# Patient Record
Sex: FEMALE | Race: WHITE | Employment: OTHER | URBAN - METROPOLITAN AREA
[De-identification: names, ages, dates, MRNs, and addresses within clinical notes are randomized per-mention and may not be internally consistent; named-entity substitution may affect disease eponyms.]

---

## 2017-02-01 ENCOUNTER — TELEPHONE (OUTPATIENT)
Dept: FAMILY MEDICINE CLINIC | Age: 77
End: 2017-02-01

## 2017-02-03 ENCOUNTER — OFFICE VISIT (OUTPATIENT)
Dept: FAMILY MEDICINE CLINIC | Age: 77
End: 2017-02-03

## 2017-02-03 VITALS
BODY MASS INDEX: 21.77 KG/M2 | DIASTOLIC BLOOD PRESSURE: 70 MMHG | SYSTOLIC BLOOD PRESSURE: 108 MMHG | HEART RATE: 100 BPM | WEIGHT: 128.8 LBS | OXYGEN SATURATION: 95 %

## 2017-02-03 DIAGNOSIS — N64.4 BREAST PAIN, LEFT: Primary | ICD-10-CM

## 2017-02-03 PROCEDURE — 99213 OFFICE O/P EST LOW 20 MIN: CPT | Performed by: FAMILY MEDICINE

## 2017-02-13 ENCOUNTER — HOSPITAL ENCOUNTER (OUTPATIENT)
Dept: MAMMOGRAPHY | Age: 77
Discharge: HOME | End: 2017-02-13
Attending: FAMILY MEDICINE | Admitting: FAMILY MEDICINE

## 2017-02-13 DIAGNOSIS — N64.4 BREAST PAIN, LEFT: ICD-10-CM

## 2017-02-24 ENCOUNTER — OFFICE VISIT (OUTPATIENT)
Dept: FAMILY MEDICINE CLINIC | Age: 77
End: 2017-02-24

## 2017-02-24 VITALS
HEART RATE: 80 BPM | SYSTOLIC BLOOD PRESSURE: 130 MMHG | BODY MASS INDEX: 21.11 KG/M2 | HEIGHT: 65 IN | DIASTOLIC BLOOD PRESSURE: 84 MMHG | WEIGHT: 126.7 LBS

## 2017-02-24 DIAGNOSIS — R73.01 ELEVATED FASTING BLOOD SUGAR: ICD-10-CM

## 2017-02-24 DIAGNOSIS — Z00.00 ROUTINE GENERAL MEDICAL EXAMINATION AT A HEALTH CARE FACILITY: ICD-10-CM

## 2017-02-24 DIAGNOSIS — Z12.12 SCREENING FOR COLORECTAL CANCER: ICD-10-CM

## 2017-02-24 DIAGNOSIS — I10 ESSENTIAL HYPERTENSION: Primary | Chronic | ICD-10-CM

## 2017-02-24 DIAGNOSIS — G25.0 BENIGN FAMILIAL TREMOR: Chronic | ICD-10-CM

## 2017-02-24 DIAGNOSIS — Z12.11 SCREENING FOR COLORECTAL CANCER: ICD-10-CM

## 2017-02-24 LAB
A/G RATIO: 1.8 (ref 1.1–2.2)
ALBUMIN SERPL-MCNC: 4.6 G/DL (ref 3.4–5)
ALP BLD-CCNC: 86 U/L (ref 40–129)
ALT SERPL-CCNC: 20 U/L (ref 10–40)
ANION GAP SERPL CALCULATED.3IONS-SCNC: 15 MMOL/L (ref 3–16)
AST SERPL-CCNC: 19 U/L (ref 15–37)
BASOPHILS ABSOLUTE: 0.1 K/UL (ref 0–0.2)
BASOPHILS RELATIVE PERCENT: 0.8 %
BILIRUB SERPL-MCNC: 0.6 MG/DL (ref 0–1)
BUN BLDV-MCNC: 13 MG/DL (ref 7–20)
CALCIUM SERPL-MCNC: 9.7 MG/DL (ref 8.3–10.6)
CHLORIDE BLD-SCNC: 95 MMOL/L (ref 99–110)
CHOLESTEROL, TOTAL: 194 MG/DL (ref 0–199)
CO2: 27 MMOL/L (ref 21–32)
CREAT SERPL-MCNC: 0.8 MG/DL (ref 0.6–1.2)
EOSINOPHILS ABSOLUTE: 0.1 K/UL (ref 0–0.6)
EOSINOPHILS RELATIVE PERCENT: 0.9 %
GFR AFRICAN AMERICAN: >60
GFR NON-AFRICAN AMERICAN: >60
GLOBULIN: 2.6 G/DL
GLUCOSE BLD-MCNC: 107 MG/DL (ref 70–99)
HCT VFR BLD CALC: 42.6 % (ref 36–48)
HDLC SERPL-MCNC: 73 MG/DL (ref 40–60)
HEMOGLOBIN: 14 G/DL (ref 12–16)
LDL CHOLESTEROL CALCULATED: 100 MG/DL
LYMPHOCYTES ABSOLUTE: 2.1 K/UL (ref 1–5.1)
LYMPHOCYTES RELATIVE PERCENT: 18 %
MCH RBC QN AUTO: 31.4 PG (ref 26–34)
MCHC RBC AUTO-ENTMCNC: 32.8 G/DL (ref 31–36)
MCV RBC AUTO: 95.7 FL (ref 80–100)
MONOCYTES ABSOLUTE: 0.7 K/UL (ref 0–1.3)
MONOCYTES RELATIVE PERCENT: 5.7 %
NEUTROPHILS ABSOLUTE: 8.6 K/UL (ref 1.7–7.7)
NEUTROPHILS RELATIVE PERCENT: 74.6 %
PDW BLD-RTO: 13.3 % (ref 12.4–15.4)
PLATELET # BLD: 190 K/UL (ref 135–450)
PMV BLD AUTO: 9.1 FL (ref 5–10.5)
POTASSIUM SERPL-SCNC: 4.1 MMOL/L (ref 3.5–5.1)
RBC # BLD: 4.45 M/UL (ref 4–5.2)
SODIUM BLD-SCNC: 137 MMOL/L (ref 136–145)
TOTAL PROTEIN: 7.2 G/DL (ref 6.4–8.2)
TRIGL SERPL-MCNC: 104 MG/DL (ref 0–150)
VLDLC SERPL CALC-MCNC: 21 MG/DL
WBC # BLD: 11.5 K/UL (ref 4–11)

## 2017-02-24 PROCEDURE — 36415 COLL VENOUS BLD VENIPUNCTURE: CPT | Performed by: FAMILY MEDICINE

## 2017-02-24 PROCEDURE — 99214 OFFICE O/P EST MOD 30 MIN: CPT | Performed by: FAMILY MEDICINE

## 2017-02-24 RX ORDER — LISINOPRIL 2.5 MG/1
TABLET ORAL
Qty: 90 TABLET | Refills: 1 | Status: SHIPPED | OUTPATIENT
Start: 2017-02-24 | End: 2017-09-07 | Stop reason: SDUPTHER

## 2017-02-24 RX ORDER — PROPRANOLOL HYDROCHLORIDE 120 MG/1
CAPSULE, EXTENDED RELEASE ORAL
Qty: 90 CAPSULE | Refills: 1 | Status: SHIPPED | OUTPATIENT
Start: 2017-02-24 | End: 2017-09-07 | Stop reason: SDUPTHER

## 2017-03-09 LAB
CONTROL: NORMAL
HEMOCCULT STL QL: NEGATIVE

## 2017-03-09 PROCEDURE — 82274 ASSAY TEST FOR BLOOD FECAL: CPT | Performed by: FAMILY MEDICINE

## 2017-10-23 ENCOUNTER — OFFICE VISIT (OUTPATIENT)
Dept: DERMATOLOGY | Age: 77
End: 2017-10-23

## 2017-10-23 DIAGNOSIS — L57.0 ACTINIC KERATOSES: Primary | ICD-10-CM

## 2017-10-23 DIAGNOSIS — Z12.83 SCREENING EXAM FOR SKIN CANCER: ICD-10-CM

## 2017-10-23 DIAGNOSIS — Z85.828 HISTORY OF NONMELANOMA SKIN CANCER: ICD-10-CM

## 2017-10-23 PROCEDURE — 17000 DESTRUCT PREMALG LESION: CPT | Performed by: DERMATOLOGY

## 2017-10-23 PROCEDURE — 99213 OFFICE O/P EST LOW 20 MIN: CPT | Performed by: DERMATOLOGY

## 2017-10-23 PROCEDURE — 17003 DESTRUCT PREMALG LES 2-14: CPT | Performed by: DERMATOLOGY

## 2017-10-23 NOTE — PROGRESS NOTES
[facility]) Dermatology  Su Vicente MD  [phone]      Dorris Spatz  1940    78 y.o. female     Date of Visit: 10/23/2017    Last Visit: 6mo    Chief Complaint: Skin check    History of Present Illness:  1. Here for skin check. 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    -No new lesions of concern.   -Wears long pants and sleeves, SPF 30 sunscreen whenever out. 2. History of actinic keratoses s/p cryotherapy. Reports a rough lesion on top of scalp. Review of Systems:  Constitutional: Reports general sense of well-being. Skin: No interval severe sunburns. Allergies: Reviewed and updated. Past Medical History, Surgical History, Medications and Allergies reviewed.      Past Medical History:   Diagnosis Date    Basal cell carcinoma of skin of trunk 5/2011    back    Benign familial tremor     sxs began in HS    Decreased bone density     Hip fracture ([address] 2/12    sustained in fall    Hypertension      Past Surgical History:   Procedure Laterality Date    DENTAL SURGERY  1/2013    JOINT REPLACEMENT  2/13/12    right bipolar hip replacement    OTHER SURGICAL HISTORY  10/2011 approx    mole removal from back    SKIN CANCER EXCISION  8/2015    squamous cell skin cancer    TONSILLECTOMY  age 13   [facility] TUBAL LIGATION      d & c done simultaneously to remove IUD       No Known Allergies  Outpatient Prescriptions Marked as Taking for the 10/23/17 encounter (Office Visit) with Su Vicente MD   Medication Sig Dispense Refill    lisinopril (PRINIVIL;ZESTRIL) 2.5 MG tablet TAKE ONE TABLET BY MOUTH ONCE DAILY 90 tablet 0    propranolol (INDERAL LA) 120 MG extended release capsule TAKE ONE CAPSULE BY MOUTH ONCE DAILY 90 capsule 0    Omega-3 Fatty Acids (FISH OIL PO) Take 1 capsule by mouth daily      Multiple Vitamins-Minerals (MULTIVITAMIN ADULT PO) Take 1 tablet by mouth daily         Physical Examination     The following were examined and determined to be normal: Psych/Neuro, Conjunctivae/eyelids, Gums/teeth/lips, Neck, Breast/axilla/chest, Abdomen, LUE, RLE, LLE, Nails/digits and Genitalia/groin/buttocks. The following were examined and determined to be abnormal: Scalp/hair, Head/face, Back and RUE. -General: Well-appearing, NAD  1. Midline upper back, R upper arm - scars clear  2. L vertex scalp 1, nasal bridge 1, R temple 1 - ill-defined irregularly-shaped roughly-scaling thin pink macules/papules     Assessment and Plan     1. History of NMSC - clear today  -Reviewed sun protective behavior -- sun avoidance during the peak hours of the day, sun-protective clothing (including hat and sunglasses), sunscreen use (water resistant, broad spectrum, SPF at least 30, need for reapplication every 2 to 3 hours), avoidance of tanning beds  -Full skin exam in 1 year (sooner if indicated)      2. Actinic keratosis(es)  -Edu re: relationship with chronic cumulative sun exposure, low premalignant potential.   -3 lesion(s) treated w/ liquid nitrogen x 2 cycles - L vertex scalp 1, nasal bridge 1, R temple 1. Edu re: risk of blister formation, discomfort, scar, hypopigmentation. Discussed wound care.

## 2017-10-23 NOTE — PATIENT INSTRUCTIONS
Protecting Yourself From the Sun    · Apply broad spectrum water resistant sunscreen with an SPF of at least 30 to exposed areas of the skin. Dont forget the ears and lips! Remember to reapply sunscreen about every 2 hours and after swimming or sweating. · Wear sun protective clothing. Swim shirts (aka. rash guards) are a great idea and negates the need to reapply sunscreen in those areas. · Seek the shade whenever possible especially between the hours of 10 am and 4 pm when the suns rays are the strongest.     · Avoid tanning beds       Cryosurgery (Freezing) Wound Care Instructions    AFTER THE PROCEDURE:    You will notice swelling and redness around the site. This is normal.    You may experience a sharp or sore feeling for the next several days. For this discomfort, you may take acetaminophen (Tylenol©).  A blister may develop at the treated area, sometimes as soon as by the end of the day. After several days, the blister will subside and a scab will form.  If the area is bumped or traumatized during the first few days following freezing, you may develop bleeding into the blister, forming a blood blister. This is nothing to be alarmed about.  If the blister is tense, uncomfortable, or much larger than the site that was frozen, you may pop the blister along its edge with a sterile needle (boiled, heated under a flame, or cleaned with alcohol) to allow the fluid to drain out. If the blister does not bother you, no treatment is needed.  Do NOT peel off the top of the blister roof. It will act as a dressing on top of your wound. WOUND CARE:    You may shower or bathe as usual, but avoid scrubbing the areas that have been frozen.  Cleanse the site twice a day with mild soapy water, and then apply a thin film of white petrolatum (Vaseline©).  You do not need to cover the area, but can if you prefer.     Do NOT allow the site to become dry or crusted, or attempt to dry it out with rubbing alcohol or hydrogen peroxide.  Continue this regimen until the area is pink and healed. Depending on the size and location of your cryosurgery site, healing may take 2 to 4 weeks.  The area may continue to be pink for several weeks, and over the next few months may become darker or lighter than the surrounding skin. This may be a permanent change.

## 2017-12-06 DIAGNOSIS — G25.0 BENIGN FAMILIAL TREMOR: Chronic | ICD-10-CM

## 2017-12-06 DIAGNOSIS — I10 ESSENTIAL HYPERTENSION: Chronic | ICD-10-CM

## 2017-12-06 RX ORDER — PROPRANOLOL HYDROCHLORIDE 120 MG/1
CAPSULE, EXTENDED RELEASE ORAL
Qty: 90 CAPSULE | Refills: 0 | Status: SHIPPED | OUTPATIENT
Start: 2017-12-06 | End: 2018-03-13 | Stop reason: SDUPTHER

## 2017-12-06 RX ORDER — LISINOPRIL 2.5 MG/1
TABLET ORAL
Qty: 90 TABLET | Refills: 0 | Status: SHIPPED | OUTPATIENT
Start: 2017-12-06 | End: 2018-03-13 | Stop reason: SDUPTHER

## 2017-12-12 ENCOUNTER — OFFICE VISIT (OUTPATIENT)
Dept: FAMILY MEDICINE CLINIC | Age: 77
End: 2017-12-12

## 2017-12-12 VITALS
DIASTOLIC BLOOD PRESSURE: 72 MMHG | OXYGEN SATURATION: 91 % | BODY MASS INDEX: 21.43 KG/M2 | HEART RATE: 54 BPM | SYSTOLIC BLOOD PRESSURE: 122 MMHG | WEIGHT: 128.6 LBS | HEIGHT: 65 IN

## 2017-12-12 DIAGNOSIS — I10 ESSENTIAL HYPERTENSION: Primary | ICD-10-CM

## 2017-12-12 LAB
A/G RATIO: 1.9 (ref 1.1–2.2)
ALBUMIN SERPL-MCNC: 4.7 G/DL (ref 3.4–5)
ALP BLD-CCNC: 91 U/L (ref 40–129)
ALT SERPL-CCNC: 17 U/L (ref 10–40)
ANION GAP SERPL CALCULATED.3IONS-SCNC: 16 MMOL/L (ref 3–16)
AST SERPL-CCNC: 17 U/L (ref 15–37)
BILIRUB SERPL-MCNC: 0.6 MG/DL (ref 0–1)
BUN BLDV-MCNC: 14 MG/DL (ref 7–20)
CALCIUM SERPL-MCNC: 9.7 MG/DL (ref 8.3–10.6)
CHLORIDE BLD-SCNC: 98 MMOL/L (ref 99–110)
CHOLESTEROL, TOTAL: 194 MG/DL (ref 0–199)
CO2: 26 MMOL/L (ref 21–32)
CREAT SERPL-MCNC: 0.7 MG/DL (ref 0.6–1.2)
GFR AFRICAN AMERICAN: >60
GFR NON-AFRICAN AMERICAN: >60
GLOBULIN: 2.5 G/DL
GLUCOSE BLD-MCNC: 109 MG/DL (ref 70–99)
HDLC SERPL-MCNC: 71 MG/DL (ref 40–60)
LDL CHOLESTEROL CALCULATED: 101 MG/DL
POTASSIUM SERPL-SCNC: 4.4 MMOL/L (ref 3.5–5.1)
SODIUM BLD-SCNC: 140 MMOL/L (ref 136–145)
TOTAL PROTEIN: 7.2 G/DL (ref 6.4–8.2)
TRIGL SERPL-MCNC: 110 MG/DL (ref 0–150)
VLDLC SERPL CALC-MCNC: 22 MG/DL

## 2017-12-12 PROCEDURE — 36415 COLL VENOUS BLD VENIPUNCTURE: CPT | Performed by: PHYSICIAN ASSISTANT

## 2017-12-12 PROCEDURE — 99213 OFFICE O/P EST LOW 20 MIN: CPT | Performed by: PHYSICIAN ASSISTANT

## 2017-12-12 RX ORDER — INFLUENZA A VIRUS A/MICHIGAN/45/2015 X-275 (H1N1) ANTIGEN (FORMALDEHYDE INACTIVATED), INFLUENZA A VIRUS A/SINGAPORE/INFIMH-16-0019/2016 IVR-186 (H3N2) ANTIGEN (FORMALDEHYDE INACTIVATED), AND INFLUENZA B VIRUS B/MARYLAND/15/2016 BX-69A (A B/COLORADO/6/2017-LIKE VIRUS) ANTIGEN (FORMALDEHYDE INACTIVATED) 60; 60; 60 UG/.5ML; UG/.5ML; UG/.5ML
INJECTION, SUSPENSION INTRAMUSCULAR
COMMUNITY
Start: 2017-10-17 | End: 2018-06-14 | Stop reason: ALTCHOICE

## 2017-12-12 ASSESSMENT — PATIENT HEALTH QUESTIONNAIRE - PHQ9
1. LITTLE INTEREST OR PLEASURE IN DOING THINGS: 0
SUM OF ALL RESPONSES TO PHQ9 QUESTIONS 1 & 2: 0
SUM OF ALL RESPONSES TO PHQ QUESTIONS 1-9: 0
2. FEELING DOWN, DEPRESSED OR HOPELESS: 0

## 2017-12-12 NOTE — PROGRESS NOTES
Subjective:   Germain Knight is a 78 y.o. female with hypertension. Current Outpatient Prescriptions   Medication Sig Dispense Refill    FLUZONE HIGH-DOSE 0.5 ML SINA injection       lisinopril (PRINIVIL;ZESTRIL) 2.5 MG tablet TAKE ONE TABLET BY MOUTH ONCE DAILY 90 tablet 0    propranolol (INDERAL LA) 120 MG extended release capsule TAKE ONE CAPSULE BY MOUTH ONCE DAILY 90 capsule 0    Omega-3 Fatty Acids (FISH OIL PO) Take 1 capsule by mouth daily      Multiple Vitamins-Minerals (MULTIVITAMIN ADULT PO) Take 1 tablet by mouth daily       No current facility-administered medications for this visit. Hypertension ROS: taking medications as instructed, no medication side effects noted, no TIA's, no chest pain on exertion, no dyspnea on exertion, no swelling of ankles. New concerns: none today. .     Objective:   /72 (Site: Left Arm, Position: Sitting, Cuff Size: Medium Adult)   Pulse 54   Ht 5' 4.5\" (1.638 m)   Wt 128 lb 9.6 oz (58.3 kg)   LMP 03/01/1992   SpO2 91%   BMI 21.73 kg/m²    Appearance alert, well appearing, and in no distress, oriented to person, place, and time and normal appearing weight. General exam BP noted to be well controlled today in office, S1, S2 normal, no gallop, no murmur, chest clear, no JVD, no HSM, no edema. Assessment:    Hypertension well controlled. Plan:   Current treatment plan is effective, no change in therapy. Olive Rojo

## 2018-03-13 ENCOUNTER — TELEPHONE (OUTPATIENT)
Dept: FAMILY MEDICINE CLINIC | Age: 78
End: 2018-03-13

## 2018-03-13 DIAGNOSIS — G25.0 BENIGN FAMILIAL TREMOR: Chronic | ICD-10-CM

## 2018-03-13 DIAGNOSIS — I10 ESSENTIAL HYPERTENSION: Chronic | ICD-10-CM

## 2018-03-13 RX ORDER — PROPRANOLOL HYDROCHLORIDE 120 MG/1
CAPSULE, EXTENDED RELEASE ORAL
Qty: 90 CAPSULE | Refills: 0 | Status: SHIPPED | OUTPATIENT
Start: 2018-03-13 | End: 2018-06-14 | Stop reason: SDUPTHER

## 2018-03-13 RX ORDER — LISINOPRIL 2.5 MG/1
TABLET ORAL
Qty: 90 TABLET | Refills: 0 | Status: SHIPPED | OUTPATIENT
Start: 2018-03-13 | End: 2018-05-21 | Stop reason: SDUPTHER

## 2018-03-13 NOTE — TELEPHONE ENCOUNTER
Patient returned call. Patient( and son) did not make an appointment until June. Advised sooner, but said would like to hold off until then. She has not taken the Lisinopril for a week. Feels fine. She would like to completely stop taking this medication. I advised to do BP readings daily and report to office. Also advised clinical would call if Dr. Soraya Cramer would lke her to do something different. Leave a message is no message.

## 2018-03-13 NOTE — TELEPHONE ENCOUNTER
Debra Bob is requesting refill(s) lisinopril, propranolol  Last OV 12/12/17 (pertaining to medication)  LR 12/6/17 (per medication requested)  Next office visit scheduled or attempted No   If no, reason:  Left message for patient to call and schedule 6mo f/u

## 2018-05-21 DIAGNOSIS — I10 ESSENTIAL HYPERTENSION: Chronic | ICD-10-CM

## 2018-05-22 RX ORDER — LISINOPRIL 2.5 MG/1
TABLET ORAL
Qty: 90 TABLET | Refills: 0 | Status: SHIPPED | OUTPATIENT
Start: 2018-05-22 | End: 2018-09-04 | Stop reason: SDUPTHER

## 2018-06-14 ENCOUNTER — OFFICE VISIT (OUTPATIENT)
Dept: FAMILY MEDICINE CLINIC | Age: 78
End: 2018-06-14

## 2018-06-14 VITALS
HEIGHT: 65 IN | HEART RATE: 86 BPM | WEIGHT: 126.8 LBS | BODY MASS INDEX: 21.13 KG/M2 | SYSTOLIC BLOOD PRESSURE: 144 MMHG | DIASTOLIC BLOOD PRESSURE: 80 MMHG

## 2018-06-14 DIAGNOSIS — I10 ESSENTIAL HYPERTENSION: Chronic | ICD-10-CM

## 2018-06-14 DIAGNOSIS — Z78.0 POSTMENOPAUSAL: ICD-10-CM

## 2018-06-14 DIAGNOSIS — R73.03 PREDIABETES: ICD-10-CM

## 2018-06-14 DIAGNOSIS — D72.829 LEUKOCYTOSIS, UNSPECIFIED TYPE: Primary | ICD-10-CM

## 2018-06-14 DIAGNOSIS — G25.0 BENIGN FAMILIAL TREMOR: Chronic | ICD-10-CM

## 2018-06-14 LAB
ANION GAP SERPL CALCULATED.3IONS-SCNC: 16 MMOL/L (ref 3–16)
BASOPHILS ABSOLUTE: 0.1 K/UL (ref 0–0.2)
BASOPHILS RELATIVE PERCENT: 1.2 %
BUN BLDV-MCNC: 11 MG/DL (ref 7–20)
CALCIUM SERPL-MCNC: 9.6 MG/DL (ref 8.3–10.6)
CHLORIDE BLD-SCNC: 94 MMOL/L (ref 99–110)
CO2: 26 MMOL/L (ref 21–32)
CREAT SERPL-MCNC: 0.7 MG/DL (ref 0.6–1.2)
EOSINOPHILS ABSOLUTE: 0.1 K/UL (ref 0–0.6)
EOSINOPHILS RELATIVE PERCENT: 1.2 %
GFR AFRICAN AMERICAN: >60
GFR NON-AFRICAN AMERICAN: >60
GLUCOSE BLD-MCNC: 114 MG/DL (ref 70–99)
HCT VFR BLD CALC: 40.6 % (ref 36–48)
HEMOGLOBIN: 13.9 G/DL (ref 12–16)
LYMPHOCYTES ABSOLUTE: 1.6 K/UL (ref 1–5.1)
LYMPHOCYTES RELATIVE PERCENT: 23.7 %
MCH RBC QN AUTO: 32.7 PG (ref 26–34)
MCHC RBC AUTO-ENTMCNC: 34.3 G/DL (ref 31–36)
MCV RBC AUTO: 95.5 FL (ref 80–100)
MONOCYTES ABSOLUTE: 0.5 K/UL (ref 0–1.3)
MONOCYTES RELATIVE PERCENT: 7.4 %
NEUTROPHILS ABSOLUTE: 4.6 K/UL (ref 1.7–7.7)
NEUTROPHILS RELATIVE PERCENT: 66.5 %
PDW BLD-RTO: 13.6 % (ref 12.4–15.4)
PLATELET # BLD: 193 K/UL (ref 135–450)
PMV BLD AUTO: 9.6 FL (ref 5–10.5)
POTASSIUM SERPL-SCNC: 4.7 MMOL/L (ref 3.5–5.1)
RBC # BLD: 4.25 M/UL (ref 4–5.2)
SODIUM BLD-SCNC: 136 MMOL/L (ref 136–145)
TSH REFLEX: 1.87 UIU/ML (ref 0.27–4.2)
WBC # BLD: 6.9 K/UL (ref 4–11)

## 2018-06-14 PROCEDURE — 36415 COLL VENOUS BLD VENIPUNCTURE: CPT | Performed by: FAMILY MEDICINE

## 2018-06-14 PROCEDURE — 99214 OFFICE O/P EST MOD 30 MIN: CPT | Performed by: FAMILY MEDICINE

## 2018-06-14 RX ORDER — PROPRANOLOL HYDROCHLORIDE 120 MG/1
CAPSULE, EXTENDED RELEASE ORAL
Qty: 90 CAPSULE | Refills: 3 | Status: SHIPPED | OUTPATIENT
Start: 2018-06-14 | End: 2019-08-30 | Stop reason: SDUPTHER

## 2018-06-14 ASSESSMENT — ENCOUNTER SYMPTOMS
VOMITING: 0
EYE REDNESS: 0
COUGH: 0
DIARRHEA: 0
BLOOD IN STOOL: 0
RHINORRHEA: 0
COLOR CHANGE: 0
SHORTNESS OF BREATH: 0
ABDOMINAL PAIN: 0
EYE PAIN: 0
SINUS PRESSURE: 0
WHEEZING: 0
CONSTIPATION: 0
EYE DISCHARGE: 0
CHEST TIGHTNESS: 0

## 2018-06-15 LAB
ESTIMATED AVERAGE GLUCOSE: 122.6 MG/DL
HBA1C MFR BLD: 5.9 %

## 2018-06-18 ENCOUNTER — TELEPHONE (OUTPATIENT)
Dept: FAMILY MEDICINE CLINIC | Age: 78
End: 2018-06-18

## 2018-06-18 NOTE — TELEPHONE ENCOUNTER
FYI  --  Patient returned call regarding results. I read the physician notes, she understood, no questions.

## 2018-09-04 DIAGNOSIS — I10 ESSENTIAL HYPERTENSION: Chronic | ICD-10-CM

## 2018-09-04 RX ORDER — LISINOPRIL 2.5 MG/1
TABLET ORAL
Qty: 90 TABLET | Refills: 0 | Status: SHIPPED | OUTPATIENT
Start: 2018-09-04 | End: 2018-11-30 | Stop reason: SDUPTHER

## 2018-09-04 NOTE — TELEPHONE ENCOUNTER
Cleave Patricia is requesting refill(s) lisinopril  Last OV 6/14/18 (pertaining to medication)  LR 5/22/18 (per medication requested)  Next office visit scheduled or attempted Yes   If no, reason:  1/14/19

## 2018-10-22 ENCOUNTER — OFFICE VISIT (OUTPATIENT)
Dept: DERMATOLOGY | Age: 78
End: 2018-10-22
Payer: COMMERCIAL

## 2018-10-22 DIAGNOSIS — L57.0 ACTINIC KERATOSES: Primary | ICD-10-CM

## 2018-10-22 DIAGNOSIS — Z12.83 SCREENING EXAM FOR SKIN CANCER: ICD-10-CM

## 2018-10-22 DIAGNOSIS — Z85.828 HISTORY OF NONMELANOMA SKIN CANCER: ICD-10-CM

## 2018-10-22 PROCEDURE — 17003 DESTRUCT PREMALG LES 2-14: CPT | Performed by: DERMATOLOGY

## 2018-10-22 PROCEDURE — 17000 DESTRUCT PREMALG LESION: CPT | Performed by: DERMATOLOGY

## 2018-10-22 PROCEDURE — 99213 OFFICE O/P EST LOW 20 MIN: CPT | Performed by: DERMATOLOGY

## 2018-11-30 DIAGNOSIS — I10 ESSENTIAL HYPERTENSION: Chronic | ICD-10-CM

## 2018-11-30 RX ORDER — LISINOPRIL 2.5 MG/1
TABLET ORAL
Qty: 90 TABLET | Refills: 0 | Status: SHIPPED | OUTPATIENT
Start: 2018-11-30 | End: 2019-02-27 | Stop reason: SDUPTHER

## 2019-02-27 DIAGNOSIS — I10 ESSENTIAL HYPERTENSION: Chronic | ICD-10-CM

## 2019-02-27 RX ORDER — LISINOPRIL 2.5 MG/1
TABLET ORAL
Qty: 90 TABLET | Refills: 0 | Status: SHIPPED | OUTPATIENT
Start: 2019-02-27 | End: 2019-06-07 | Stop reason: SDUPTHER

## 2019-03-05 ENCOUNTER — OFFICE VISIT (OUTPATIENT)
Dept: FAMILY MEDICINE CLINIC | Age: 79
End: 2019-03-05
Payer: MEDICARE

## 2019-03-05 VITALS
HEIGHT: 65 IN | WEIGHT: 126.6 LBS | OXYGEN SATURATION: 97 % | BODY MASS INDEX: 21.09 KG/M2 | DIASTOLIC BLOOD PRESSURE: 78 MMHG | HEART RATE: 80 BPM | SYSTOLIC BLOOD PRESSURE: 130 MMHG

## 2019-03-05 DIAGNOSIS — M85.80 OSTEOPENIA, UNSPECIFIED LOCATION: Chronic | ICD-10-CM

## 2019-03-05 DIAGNOSIS — R73.03 PREDIABETES: ICD-10-CM

## 2019-03-05 DIAGNOSIS — G25.0 BENIGN FAMILIAL TREMOR: Chronic | ICD-10-CM

## 2019-03-05 DIAGNOSIS — K40.90 RIGHT GROIN HERNIA: ICD-10-CM

## 2019-03-05 DIAGNOSIS — I10 ESSENTIAL HYPERTENSION: Primary | ICD-10-CM

## 2019-03-05 LAB
ANION GAP SERPL CALCULATED.3IONS-SCNC: 15 MMOL/L (ref 3–16)
BUN BLDV-MCNC: 9 MG/DL (ref 7–20)
CALCIUM SERPL-MCNC: 9.6 MG/DL (ref 8.3–10.6)
CHLORIDE BLD-SCNC: 96 MMOL/L (ref 99–110)
CO2: 27 MMOL/L (ref 21–32)
CREAT SERPL-MCNC: 0.7 MG/DL (ref 0.6–1.2)
GFR AFRICAN AMERICAN: >60
GFR NON-AFRICAN AMERICAN: >60
GLUCOSE BLD-MCNC: 113 MG/DL (ref 70–99)
POTASSIUM SERPL-SCNC: 4.6 MMOL/L (ref 3.5–5.1)
SODIUM BLD-SCNC: 138 MMOL/L (ref 136–145)

## 2019-03-05 PROCEDURE — G8482 FLU IMMUNIZE ORDER/ADMIN: HCPCS | Performed by: FAMILY MEDICINE

## 2019-03-05 PROCEDURE — 1101F PT FALLS ASSESS-DOCD LE1/YR: CPT | Performed by: FAMILY MEDICINE

## 2019-03-05 PROCEDURE — G8427 DOCREV CUR MEDS BY ELIG CLIN: HCPCS | Performed by: FAMILY MEDICINE

## 2019-03-05 PROCEDURE — 1090F PRES/ABSN URINE INCON ASSESS: CPT | Performed by: FAMILY MEDICINE

## 2019-03-05 PROCEDURE — 99214 OFFICE O/P EST MOD 30 MIN: CPT | Performed by: FAMILY MEDICINE

## 2019-03-05 PROCEDURE — 4040F PNEUMOC VAC/ADMIN/RCVD: CPT | Performed by: FAMILY MEDICINE

## 2019-03-05 PROCEDURE — G8420 CALC BMI NORM PARAMETERS: HCPCS | Performed by: FAMILY MEDICINE

## 2019-03-05 PROCEDURE — 1123F ACP DISCUSS/DSCN MKR DOCD: CPT | Performed by: FAMILY MEDICINE

## 2019-03-05 PROCEDURE — 36415 COLL VENOUS BLD VENIPUNCTURE: CPT | Performed by: FAMILY MEDICINE

## 2019-03-05 PROCEDURE — 1036F TOBACCO NON-USER: CPT | Performed by: FAMILY MEDICINE

## 2019-03-05 PROCEDURE — G8399 PT W/DXA RESULTS DOCUMENT: HCPCS | Performed by: FAMILY MEDICINE

## 2019-03-05 ASSESSMENT — ENCOUNTER SYMPTOMS
EYE PAIN: 0
CONSTIPATION: 0
EYE REDNESS: 0
SHORTNESS OF BREATH: 0
WHEEZING: 0
EYE DISCHARGE: 0
BLOOD IN STOOL: 0
CHEST TIGHTNESS: 0
COUGH: 0
ABDOMINAL PAIN: 0
SINUS PRESSURE: 0
DIARRHEA: 0
VOMITING: 0
RHINORRHEA: 0

## 2019-03-05 ASSESSMENT — PATIENT HEALTH QUESTIONNAIRE - PHQ9
SUM OF ALL RESPONSES TO PHQ QUESTIONS 1-9: 0
1. LITTLE INTEREST OR PLEASURE IN DOING THINGS: 0
SUM OF ALL RESPONSES TO PHQ9 QUESTIONS 1 & 2: 0
SUM OF ALL RESPONSES TO PHQ QUESTIONS 1-9: 0
2. FEELING DOWN, DEPRESSED OR HOPELESS: 0

## 2019-03-06 LAB
ESTIMATED AVERAGE GLUCOSE: 131.2 MG/DL
HBA1C MFR BLD: 6.2 %

## 2019-03-29 ENCOUNTER — PREP FOR PROCEDURE (OUTPATIENT)
Dept: SURGERY | Age: 79
End: 2019-03-29

## 2019-03-29 ENCOUNTER — INITIAL CONSULT (OUTPATIENT)
Dept: SURGERY | Age: 79
End: 2019-03-29
Payer: MEDICARE

## 2019-03-29 VITALS
SYSTOLIC BLOOD PRESSURE: 128 MMHG | WEIGHT: 127.8 LBS | DIASTOLIC BLOOD PRESSURE: 72 MMHG | HEART RATE: 78 BPM | BODY MASS INDEX: 21.29 KG/M2 | HEIGHT: 65 IN

## 2019-03-29 DIAGNOSIS — K40.90 RIGHT GROIN HERNIA: Primary | ICD-10-CM

## 2019-03-29 PROCEDURE — G8482 FLU IMMUNIZE ORDER/ADMIN: HCPCS | Performed by: SURGERY

## 2019-03-29 PROCEDURE — G8427 DOCREV CUR MEDS BY ELIG CLIN: HCPCS | Performed by: SURGERY

## 2019-03-29 PROCEDURE — G8420 CALC BMI NORM PARAMETERS: HCPCS | Performed by: SURGERY

## 2019-03-29 PROCEDURE — 1090F PRES/ABSN URINE INCON ASSESS: CPT | Performed by: SURGERY

## 2019-03-29 PROCEDURE — 99203 OFFICE O/P NEW LOW 30 MIN: CPT | Performed by: SURGERY

## 2019-03-29 RX ORDER — SODIUM CHLORIDE 0.9 % (FLUSH) 0.9 %
10 SYRINGE (ML) INJECTION EVERY 12 HOURS SCHEDULED
Status: CANCELLED | OUTPATIENT
Start: 2019-03-29

## 2019-03-29 RX ORDER — SODIUM CHLORIDE 0.9 % (FLUSH) 0.9 %
10 SYRINGE (ML) INJECTION PRN
Status: CANCELLED | OUTPATIENT
Start: 2019-03-29

## 2019-04-02 ENCOUNTER — OFFICE VISIT (OUTPATIENT)
Dept: FAMILY MEDICINE CLINIC | Age: 79
End: 2019-04-02
Payer: MEDICARE

## 2019-04-02 VITALS
BODY MASS INDEX: 21.59 KG/M2 | SYSTOLIC BLOOD PRESSURE: 128 MMHG | HEIGHT: 65 IN | WEIGHT: 129.6 LBS | TEMPERATURE: 98.5 F | HEART RATE: 90 BPM | OXYGEN SATURATION: 97 % | DIASTOLIC BLOOD PRESSURE: 80 MMHG

## 2019-04-02 DIAGNOSIS — G25.0 BENIGN FAMILIAL TREMOR: Chronic | ICD-10-CM

## 2019-04-02 DIAGNOSIS — I10 ESSENTIAL HYPERTENSION: Chronic | ICD-10-CM

## 2019-04-02 DIAGNOSIS — K40.90 RIGHT GROIN HERNIA: Primary | ICD-10-CM

## 2019-04-02 DIAGNOSIS — Z01.818 PRE-OP EXAM: ICD-10-CM

## 2019-04-02 PROCEDURE — 1036F TOBACCO NON-USER: CPT | Performed by: FAMILY MEDICINE

## 2019-04-02 PROCEDURE — 1090F PRES/ABSN URINE INCON ASSESS: CPT | Performed by: FAMILY MEDICINE

## 2019-04-02 PROCEDURE — 99213 OFFICE O/P EST LOW 20 MIN: CPT | Performed by: FAMILY MEDICINE

## 2019-04-02 PROCEDURE — 1123F ACP DISCUSS/DSCN MKR DOCD: CPT | Performed by: FAMILY MEDICINE

## 2019-04-02 PROCEDURE — G8399 PT W/DXA RESULTS DOCUMENT: HCPCS | Performed by: FAMILY MEDICINE

## 2019-04-02 PROCEDURE — G8427 DOCREV CUR MEDS BY ELIG CLIN: HCPCS | Performed by: FAMILY MEDICINE

## 2019-04-02 PROCEDURE — 93000 ELECTROCARDIOGRAM COMPLETE: CPT | Performed by: FAMILY MEDICINE

## 2019-04-02 PROCEDURE — G8420 CALC BMI NORM PARAMETERS: HCPCS | Performed by: FAMILY MEDICINE

## 2019-04-02 PROCEDURE — 4040F PNEUMOC VAC/ADMIN/RCVD: CPT | Performed by: FAMILY MEDICINE

## 2019-04-02 ASSESSMENT — ENCOUNTER SYMPTOMS
BLOOD IN STOOL: 0
DIARRHEA: 0
NAUSEA: 0
VOMITING: 0
RHINORRHEA: 0
ABDOMINAL PAIN: 0
COLOR CHANGE: 0
SINUS PRESSURE: 0
EYE PAIN: 0
COUGH: 0
SHORTNESS OF BREATH: 0
CONSTIPATION: 0
EYE DISCHARGE: 0
EYE REDNESS: 0
WHEEZING: 0
CHEST TIGHTNESS: 0

## 2019-04-02 NOTE — PROGRESS NOTES
Subjective:      Patient ID: Anthony Zuniga is a 70 y.o. female. HPI    Chief Complaint   Patient presents with    Pre-op Exam     Patient is here for a pre-op exam.  She is scheduled to have hernia repair surgery with Dr. Misael Goldberg on 4/10/19 at [facility]. Fax# [phone]     Here for preoperative examination for r groin hernia surgery on 4/10/19. Non smoker. Denies any problems with anesthesia  Denies chest pain, shortness of breath, dyspnea on exertion, orthopnea or syncope  Anthony Zuniga is a 70 y.o. female with the following history as recorded in [facility]:  Patient Active Problem List    Diagnosis Date Noted    Right groin hernia 03/05/2019    Prediabetes 06/14/2018    Osteopenia 12/04/2015    Essential hypertension 09/16/2015    Hip fracture, right ([address] 02/12/2012    Benign familial tremor 06/24/2010    Family history of breast cancer 06/24/2010     Current Outpatient Medications   Medication Sig Dispense Refill    lisinopril (PRINIVIL;ZESTRIL) 2.5 MG tablet TAKE 1 TABLET BY MOUTH ONCE DAILY 90 tablet 0    propranolol (INDERAL LA) 120 MG extended release capsule TAKE ONE CAPSULE BY MOUTH ONCE DAILY 90 capsule 3    Omega-3 Fatty Acids (FISH OIL PO) Take 1 capsule by mouth daily      Multiple Vitamins-Minerals (MULTIVITAMIN ADULT PO) Take 1 tablet by mouth daily       No current facility-administered medications for this visit. Allergies: Patient has no known allergies.   Past Medical History:   Diagnosis Date    Basal cell carcinoma of skin of trunk 5/2011    back    Benign familial tremor     sxs began in HS    Decreased bone density     Hip fracture ([address] 2/12    sustained in fall    Hypertension     Right inguinal hernia      Past Surgical History:   Procedure Laterality Date    DENTAL SURGERY  1/2013    JOINT REPLACEMENT  2/13/12    right bipolar hip replacement    OTHER SURGICAL HISTORY  10/2011 approx    mole removal from back    SKIN CANCER EXCISION  8/2015    squamous

## 2019-04-02 NOTE — PATIENT INSTRUCTIONS

## 2019-04-05 NOTE — PROGRESS NOTES
1. Do not eat or drink anything after 12 midnight prior to surgery. This includes no water, chewing gum mints, or ice chips. You may brush your teeth and gargle the day of surgery but DO NOT SWALLOW THE WATER. 2. Please see your family doctor/pediatrician for a history and physical and/or concerning medications. Bring any test results/reports from your physician's office. If you are under the care of a heart doctor or specialist please be aware that you may be asked to see him or her for clearance. 3. You may be asked to stop blood thinners such as Coumadin, Plavix, Fragmin, and Lovenox or Anti-inflammatories such as Aspirin, Ibuprofen, Advil, and Naproxen prior to your surgery. Please check with your doctor before stopping these or any other medications. 4. Do not smoke, and do not drink any alcoholic beverages 24 hours prior to surgery. 5. You MUST make arrangements for a responsible adult to take you home after your surgery. For your safety, you will not be allowed to leave alone or drive yourself home. Your surgery will be cancelled if you do not have a ride home. Also for your safety, it is strongly suggested someone stay with you the first 24 hrs after your surgery. 6. A parent/legal guardian must accompany a child scheduled for surgery and plan to stay at the hospital until the child is discharged. Please do not bring other children with you. 7. For your comfort,please wear simple, loose fitting clothing to the hospital.  Please do not bring valuables (money, credit cards, checkbooks, etc.) Do not wear any makeup (including no eye makeup) or nail polish on your fingers or toes. 8. For your safety, please DO NOT wear any jewelry or piercings on day of surgery. All body piercing jewelry must be removed. 9. If you have dentures, they will be removed before going to the OR; for your convenience we will provide you with a container.   If you wear contact lenses or glasses, they will be removed, they will be removed, please bring a case for them. 10. If appicable,Please see your family doctor/pediatrician for a history & physical and/or concerning medications. Bring any test results/reports from your physician's office. 11. Remember to bring Blood Bank bracelet to the hospital on the day of surgery. 12. If you have a Living Will and Durable Power of  for Healthcare, please bring in a copy. 15. Notify your Surgeon if you develop any illness between now and surgery  time, cough, cold, fever, sore throat, nausea, vomiting, etc.  Please notify your surgeon if you experience dizziness, shortness of breath or blurred vision between now & the time of your surgery   14. DO NOT shave your operative site 96 hours prior to surgery. For face & neck surgery, men may use an electric razor 48 hours prior to surgery. 15. Shower the night before surgery with _X__Antibacterial soap ___Hibiclens   16. To provide excellent care visitors will be limited to one in the room at any given time. 17.  Please bring picture ID and insurance card. 18.  Visit our web site for additional information:  [employer]/surgery.

## 2019-04-09 ENCOUNTER — ANESTHESIA EVENT (OUTPATIENT)
Dept: OPERATING ROOM | Age: 79
End: 2019-04-09
Payer: MEDICARE

## 2019-04-10 ENCOUNTER — ANESTHESIA (OUTPATIENT)
Dept: OPERATING ROOM | Age: 79
End: 2019-04-10
Payer: MEDICARE

## 2019-04-10 ENCOUNTER — HOSPITAL ENCOUNTER (OUTPATIENT)
Age: 79
Discharge: HOME | End: 2019-04-11
Attending: SURGERY | Admitting: SURGERY
Payer: MEDICARE

## 2019-04-10 VITALS — OXYGEN SATURATION: 100 % | SYSTOLIC BLOOD PRESSURE: 120 MMHG | DIASTOLIC BLOOD PRESSURE: 77 MMHG

## 2019-04-10 DIAGNOSIS — K40.90 RIGHT GROIN HERNIA: Primary | ICD-10-CM

## 2019-04-10 PROBLEM — Z98.890 S/P RIGHT INGUINAL HERNIA REPAIR: Status: ACTIVE | Noted: 2019-04-10

## 2019-04-10 PROBLEM — Z87.19 S/P RIGHT INGUINAL HERNIA REPAIR: Status: ACTIVE | Noted: 2019-04-10

## 2019-04-10 LAB
ANION GAP SERPL CALCULATED.3IONS-SCNC: 13 MMOL/L (ref 3–16)
BUN BLDV-MCNC: 13 MG/DL (ref 7–20)
CALCIUM SERPL-MCNC: 9.2 MG/DL (ref 8.3–10.6)
CHLORIDE BLD-SCNC: 99 MMOL/L (ref 99–110)
CO2: 26 MMOL/L (ref 21–32)
CREAT SERPL-MCNC: 0.7 MG/DL (ref 0.6–1.2)
GFR AFRICAN AMERICAN: >60
GFR NON-AFRICAN AMERICAN: >60
GLUCOSE BLD-MCNC: 109 MG/DL (ref 70–99)
POTASSIUM SERPL-SCNC: 4.3 MMOL/L (ref 3.5–5.1)
SODIUM BLD-SCNC: 138 MMOL/L (ref 136–145)

## 2019-04-10 PROCEDURE — 2500000003 HC RX 250 WO HCPCS: Performed by: NURSE ANESTHETIST, CERTIFIED REGISTERED

## 2019-04-10 PROCEDURE — 80048 BASIC METABOLIC PNL TOTAL CA: CPT

## 2019-04-10 PROCEDURE — 7100000001 HC PACU RECOVERY - ADDTL 15 MIN: Performed by: SURGERY

## 2019-04-10 PROCEDURE — 2500000003 HC RX 250 WO HCPCS: Performed by: SURGERY

## 2019-04-10 PROCEDURE — 3600000019 HC SURGERY ROBOT ADDTL 15MIN: Performed by: SURGERY

## 2019-04-10 PROCEDURE — S2900 ROBOTIC SURGICAL SYSTEM: HCPCS | Performed by: SURGERY

## 2019-04-10 PROCEDURE — 2580000003 HC RX 258: Performed by: STUDENT IN AN ORGANIZED HEALTH CARE EDUCATION/TRAINING PROGRAM

## 2019-04-10 PROCEDURE — 3700000000 HC ANESTHESIA ATTENDED CARE: Performed by: SURGERY

## 2019-04-10 PROCEDURE — 6360000002 HC RX W HCPCS: Performed by: NURSE ANESTHETIST, CERTIFIED REGISTERED

## 2019-04-10 PROCEDURE — 7100000000 HC PACU RECOVERY - FIRST 15 MIN: Performed by: SURGERY

## 2019-04-10 PROCEDURE — 2709999900 HC NON-CHARGEABLE SUPPLY: Performed by: SURGERY

## 2019-04-10 PROCEDURE — 49650 LAP ING HERNIA REPAIR INIT: CPT | Performed by: SURGERY

## 2019-04-10 PROCEDURE — C1781 MESH (IMPLANTABLE): HCPCS | Performed by: SURGERY

## 2019-04-10 PROCEDURE — 3600000009 HC SURGERY ROBOT BASE: Performed by: SURGERY

## 2019-04-10 PROCEDURE — 1200000000 HC SEMI PRIVATE

## 2019-04-10 PROCEDURE — 3700000001 HC ADD 15 MINUTES (ANESTHESIA): Performed by: SURGERY

## 2019-04-10 PROCEDURE — 2580000003 HC RX 258: Performed by: ANESTHESIOLOGY

## 2019-04-10 PROCEDURE — 6360000002 HC RX W HCPCS: Performed by: SURGERY

## 2019-04-10 DEVICE — MESH HERN L W10.8XL16CM R INGUINAL WHT POLYPR MFIL: Type: IMPLANTABLE DEVICE | Site: GROIN | Status: FUNCTIONAL

## 2019-04-10 RX ORDER — FENTANYL CITRATE 50 UG/ML
INJECTION, SOLUTION INTRAMUSCULAR; INTRAVENOUS PRN
Status: DISCONTINUED | OUTPATIENT
Start: 2019-04-10 | End: 2019-04-10 | Stop reason: SDUPTHER

## 2019-04-10 RX ORDER — SODIUM CHLORIDE 0.9 % (FLUSH) 0.9 %
10 SYRINGE (ML) INJECTION EVERY 12 HOURS SCHEDULED
Status: DISCONTINUED | OUTPATIENT
Start: 2019-04-10 | End: 2019-04-11 | Stop reason: HOSPADM

## 2019-04-10 RX ORDER — PROPOFOL 10 MG/ML
INJECTION, EMULSION INTRAVENOUS PRN
Status: DISCONTINUED | OUTPATIENT
Start: 2019-04-10 | End: 2019-04-10 | Stop reason: SDUPTHER

## 2019-04-10 RX ORDER — OXYCODONE HYDROCHLORIDE AND ACETAMINOPHEN 5; 325 MG/1; MG/1
2 TABLET ORAL PRN
Status: DISCONTINUED | OUTPATIENT
Start: 2019-04-10 | End: 2019-04-10 | Stop reason: HOSPADM

## 2019-04-10 RX ORDER — SODIUM CHLORIDE 0.9 % (FLUSH) 0.9 %
10 SYRINGE (ML) INJECTION PRN
Status: DISCONTINUED | OUTPATIENT
Start: 2019-04-10 | End: 2019-04-10 | Stop reason: HOSPADM

## 2019-04-10 RX ORDER — OXYCODONE HYDROCHLORIDE AND ACETAMINOPHEN 5; 325 MG/1; MG/1
2 TABLET ORAL EVERY 4 HOURS PRN
Status: DISCONTINUED | OUTPATIENT
Start: 2019-04-10 | End: 2019-04-11 | Stop reason: HOSPADM

## 2019-04-10 RX ORDER — PROPRANOLOL HCL 60 MG
120 CAPSULE, EXTENDED RELEASE 24HR ORAL DAILY
Status: DISCONTINUED | OUTPATIENT
Start: 2019-04-11 | End: 2019-04-11 | Stop reason: HOSPADM

## 2019-04-10 RX ORDER — MEPERIDINE HYDROCHLORIDE 50 MG/ML
12.5 INJECTION INTRAMUSCULAR; INTRAVENOUS; SUBCUTANEOUS EVERY 5 MIN PRN
Status: DISCONTINUED | OUTPATIENT
Start: 2019-04-10 | End: 2019-04-10 | Stop reason: HOSPADM

## 2019-04-10 RX ORDER — ACETAMINOPHEN 325 MG/1
650 TABLET ORAL EVERY 4 HOURS PRN
Status: DISCONTINUED | OUTPATIENT
Start: 2019-04-10 | End: 2019-04-11 | Stop reason: HOSPADM

## 2019-04-10 RX ORDER — SODIUM CHLORIDE, SODIUM LACTATE, POTASSIUM CHLORIDE, CALCIUM CHLORIDE 600; 310; 30; 20 MG/100ML; MG/100ML; MG/100ML; MG/100ML
INJECTION, SOLUTION INTRAVENOUS CONTINUOUS
Status: DISCONTINUED | OUTPATIENT
Start: 2019-04-10 | End: 2019-04-11 | Stop reason: HOSPADM

## 2019-04-10 RX ORDER — MORPHINE SULFATE 4 MG/ML
1 INJECTION, SOLUTION INTRAMUSCULAR; INTRAVENOUS EVERY 5 MIN PRN
Status: DISCONTINUED | OUTPATIENT
Start: 2019-04-10 | End: 2019-04-10 | Stop reason: HOSPADM

## 2019-04-10 RX ORDER — ONDANSETRON 2 MG/ML
INJECTION INTRAMUSCULAR; INTRAVENOUS PRN
Status: DISCONTINUED | OUTPATIENT
Start: 2019-04-10 | End: 2019-04-10 | Stop reason: SDUPTHER

## 2019-04-10 RX ORDER — HYDROMORPHONE HCL 110MG/55ML
0.25 PATIENT CONTROLLED ANALGESIA SYRINGE INTRAVENOUS
Status: DISCONTINUED | OUTPATIENT
Start: 2019-04-10 | End: 2019-04-11 | Stop reason: HOSPADM

## 2019-04-10 RX ORDER — BUPIVACAINE HYDROCHLORIDE 5 MG/ML
INJECTION, SOLUTION EPIDURAL; INTRACAUDAL PRN
Status: DISCONTINUED | OUTPATIENT
Start: 2019-04-10 | End: 2019-04-10

## 2019-04-10 RX ORDER — ROCURONIUM BROMIDE 10 MG/ML
INJECTION, SOLUTION INTRAVENOUS PRN
Status: DISCONTINUED | OUTPATIENT
Start: 2019-04-10 | End: 2019-04-10 | Stop reason: SDUPTHER

## 2019-04-10 RX ORDER — OXYCODONE HYDROCHLORIDE AND ACETAMINOPHEN 5; 325 MG/1; MG/1
1 TABLET ORAL PRN
Status: DISCONTINUED | OUTPATIENT
Start: 2019-04-10 | End: 2019-04-10 | Stop reason: HOSPADM

## 2019-04-10 RX ORDER — OXYCODONE HYDROCHLORIDE AND ACETAMINOPHEN 5; 325 MG/1; MG/1
1 TABLET ORAL EVERY 6 HOURS PRN
Qty: 20 TABLET | Refills: 0 | Status: SHIPPED | OUTPATIENT
Start: 2019-04-10 | End: 2019-04-15

## 2019-04-10 RX ORDER — DEXAMETHASONE SODIUM PHOSPHATE 4 MG/ML
INJECTION, SOLUTION INTRA-ARTICULAR; INTRALESIONAL; INTRAMUSCULAR; INTRAVENOUS; SOFT TISSUE PRN
Status: DISCONTINUED | OUTPATIENT
Start: 2019-04-10 | End: 2019-04-10 | Stop reason: SDUPTHER

## 2019-04-10 RX ORDER — HYDRALAZINE HYDROCHLORIDE 20 MG/ML
5 INJECTION INTRAMUSCULAR; INTRAVENOUS EVERY 10 MIN PRN
Status: DISCONTINUED | OUTPATIENT
Start: 2019-04-10 | End: 2019-04-10 | Stop reason: HOSPADM

## 2019-04-10 RX ORDER — GLYCOPYRROLATE 0.2 MG/ML
INJECTION INTRAMUSCULAR; INTRAVENOUS PRN
Status: DISCONTINUED | OUTPATIENT
Start: 2019-04-10 | End: 2019-04-10 | Stop reason: SDUPTHER

## 2019-04-10 RX ORDER — SODIUM CHLORIDE 0.9 % (FLUSH) 0.9 %
10 SYRINGE (ML) INJECTION PRN
Status: DISCONTINUED | OUTPATIENT
Start: 2019-04-10 | End: 2019-04-11 | Stop reason: HOSPADM

## 2019-04-10 RX ORDER — SODIUM CHLORIDE 0.9 % (FLUSH) 0.9 %
10 SYRINGE (ML) INJECTION EVERY 12 HOURS SCHEDULED
Status: DISCONTINUED | OUTPATIENT
Start: 2019-04-10 | End: 2019-04-10

## 2019-04-10 RX ORDER — SODIUM CHLORIDE, SODIUM LACTATE, POTASSIUM CHLORIDE, CALCIUM CHLORIDE 600; 310; 30; 20 MG/100ML; MG/100ML; MG/100ML; MG/100ML
INJECTION, SOLUTION INTRAVENOUS CONTINUOUS
Status: DISCONTINUED | OUTPATIENT
Start: 2019-04-10 | End: 2019-04-10

## 2019-04-10 RX ORDER — MORPHINE SULFATE 4 MG/ML
2 INJECTION, SOLUTION INTRAMUSCULAR; INTRAVENOUS EVERY 5 MIN PRN
Status: DISCONTINUED | OUTPATIENT
Start: 2019-04-10 | End: 2019-04-10 | Stop reason: HOSPADM

## 2019-04-10 RX ORDER — ONDANSETRON 2 MG/ML
4 INJECTION INTRAMUSCULAR; INTRAVENOUS PRN
Status: DISCONTINUED | OUTPATIENT
Start: 2019-04-10 | End: 2019-04-10 | Stop reason: HOSPADM

## 2019-04-10 RX ORDER — OXYCODONE HYDROCHLORIDE AND ACETAMINOPHEN 5; 325 MG/1; MG/1
1 TABLET ORAL EVERY 4 HOURS PRN
Status: DISCONTINUED | OUTPATIENT
Start: 2019-04-10 | End: 2019-04-11 | Stop reason: HOSPADM

## 2019-04-10 RX ORDER — HYDROMORPHONE HCL 110MG/55ML
0.5 PATIENT CONTROLLED ANALGESIA SYRINGE INTRAVENOUS
Status: DISCONTINUED | OUTPATIENT
Start: 2019-04-10 | End: 2019-04-11 | Stop reason: HOSPADM

## 2019-04-10 RX ORDER — DIPHENHYDRAMINE HYDROCHLORIDE 50 MG/ML
12.5 INJECTION INTRAMUSCULAR; INTRAVENOUS
Status: DISCONTINUED | OUTPATIENT
Start: 2019-04-10 | End: 2019-04-10 | Stop reason: HOSPADM

## 2019-04-10 RX ORDER — LABETALOL HYDROCHLORIDE 5 MG/ML
5 INJECTION, SOLUTION INTRAVENOUS EVERY 10 MIN PRN
Status: DISCONTINUED | OUTPATIENT
Start: 2019-04-10 | End: 2019-04-10 | Stop reason: HOSPADM

## 2019-04-10 RX ORDER — EPHEDRINE SULFATE 50 MG/ML
INJECTION INTRAVENOUS PRN
Status: DISCONTINUED | OUTPATIENT
Start: 2019-04-10 | End: 2019-04-10 | Stop reason: SDUPTHER

## 2019-04-10 RX ORDER — PROMETHAZINE HYDROCHLORIDE 25 MG/ML
6.25 INJECTION, SOLUTION INTRAMUSCULAR; INTRAVENOUS
Status: DISCONTINUED | OUTPATIENT
Start: 2019-04-10 | End: 2019-04-10 | Stop reason: HOSPADM

## 2019-04-10 RX ORDER — ONDANSETRON 2 MG/ML
4 INJECTION INTRAMUSCULAR; INTRAVENOUS EVERY 6 HOURS PRN
Status: DISCONTINUED | OUTPATIENT
Start: 2019-04-10 | End: 2019-04-11 | Stop reason: HOSPADM

## 2019-04-10 RX ADMIN — Medication 1 G: at 17:55

## 2019-04-10 RX ADMIN — PROPOFOL 130 MG: 10 INJECTION, EMULSION INTRAVENOUS at 14:51

## 2019-04-10 RX ADMIN — DEXAMETHASONE SODIUM PHOSPHATE 4 MG: 4 INJECTION, SOLUTION INTRAMUSCULAR; INTRAVENOUS at 15:43

## 2019-04-10 RX ADMIN — ROCURONIUM BROMIDE 10 MG: 10 SOLUTION INTRAVENOUS at 16:22

## 2019-04-10 RX ADMIN — ROCURONIUM BROMIDE 40 MG: 10 SOLUTION INTRAVENOUS at 14:51

## 2019-04-10 RX ADMIN — FENTANYL CITRATE 50 MCG: 50 INJECTION INTRAMUSCULAR; INTRAVENOUS at 14:51

## 2019-04-10 RX ADMIN — ROCURONIUM BROMIDE 10 MG: 10 SOLUTION INTRAVENOUS at 16:55

## 2019-04-10 RX ADMIN — ROCURONIUM BROMIDE 10 MG: 10 SOLUTION INTRAVENOUS at 15:43

## 2019-04-10 RX ADMIN — ROCURONIUM BROMIDE 10 MG: 10 SOLUTION INTRAVENOUS at 17:31

## 2019-04-10 RX ADMIN — SODIUM CHLORIDE, POTASSIUM CHLORIDE, SODIUM LACTATE AND CALCIUM CHLORIDE: 600; 310; 30; 20 INJECTION, SOLUTION INTRAVENOUS at 14:20

## 2019-04-10 RX ADMIN — EPHEDRINE SULFATE 10 MG: 50 INJECTION INTRAVENOUS at 15:06

## 2019-04-10 RX ADMIN — GLYCOPYRROLATE 0.1 MG: 0.2 INJECTION, SOLUTION INTRAMUSCULAR; INTRAVENOUS at 14:57

## 2019-04-10 RX ADMIN — GLYCOPYRROLATE 0.1 MG: 0.2 INJECTION, SOLUTION INTRAMUSCULAR; INTRAVENOUS at 15:09

## 2019-04-10 RX ADMIN — ONDANSETRON 4 MG: 2 INJECTION INTRAMUSCULAR; INTRAVENOUS at 15:43

## 2019-04-10 RX ADMIN — SODIUM CHLORIDE, POTASSIUM CHLORIDE, SODIUM LACTATE AND CALCIUM CHLORIDE: 600; 310; 30; 20 INJECTION, SOLUTION INTRAVENOUS at 20:57

## 2019-04-10 RX ADMIN — EPHEDRINE SULFATE 5 MG: 50 INJECTION INTRAVENOUS at 16:28

## 2019-04-10 RX ADMIN — EPHEDRINE SULFATE 5 MG: 50 INJECTION INTRAVENOUS at 17:53

## 2019-04-10 RX ADMIN — Medication 10 ML: at 21:34

## 2019-04-10 RX ADMIN — Medication 2 G: at 15:00

## 2019-04-10 RX ADMIN — FENTANYL CITRATE 50 MCG: 50 INJECTION INTRAMUSCULAR; INTRAVENOUS at 15:15

## 2019-04-10 RX ADMIN — SODIUM CHLORIDE, POTASSIUM CHLORIDE, SODIUM LACTATE AND CALCIUM CHLORIDE: 600; 310; 30; 20 INJECTION, SOLUTION INTRAVENOUS at 16:07

## 2019-04-10 RX ADMIN — SUGAMMADEX 200 MG: 100 INJECTION, SOLUTION INTRAVENOUS at 18:09

## 2019-04-10 ASSESSMENT — PULMONARY FUNCTION TESTS
PIF_VALUE: 1
PIF_VALUE: 25
PIF_VALUE: 25
PIF_VALUE: 29
PIF_VALUE: 25
PIF_VALUE: 20
PIF_VALUE: 20
PIF_VALUE: 15
PIF_VALUE: 24
PIF_VALUE: 22
PIF_VALUE: 29
PIF_VALUE: 21
PIF_VALUE: 23
PIF_VALUE: 20
PIF_VALUE: 19
PIF_VALUE: 29
PIF_VALUE: 1
PIF_VALUE: 20
PIF_VALUE: 22
PIF_VALUE: 23
PIF_VALUE: 23
PIF_VALUE: 29
PIF_VALUE: 29
PIF_VALUE: 1
PIF_VALUE: 20
PIF_VALUE: 24
PIF_VALUE: 19
PIF_VALUE: 24
PIF_VALUE: 15
PIF_VALUE: 19
PIF_VALUE: 4
PIF_VALUE: 19
PIF_VALUE: 17
PIF_VALUE: 21
PIF_VALUE: 15
PIF_VALUE: 21
PIF_VALUE: 22
PIF_VALUE: 21
PIF_VALUE: 19
PIF_VALUE: 15
PIF_VALUE: 29
PIF_VALUE: 5
PIF_VALUE: 21
PIF_VALUE: 24
PIF_VALUE: 24
PIF_VALUE: 15
PIF_VALUE: 26
PIF_VALUE: 23
PIF_VALUE: 26
PIF_VALUE: 21
PIF_VALUE: 21
PIF_VALUE: 26
PIF_VALUE: 20
PIF_VALUE: 26
PIF_VALUE: 1
PIF_VALUE: 20
PIF_VALUE: 24
PIF_VALUE: 26
PIF_VALUE: 26
PIF_VALUE: 23
PIF_VALUE: 15
PIF_VALUE: 5
PIF_VALUE: 20
PIF_VALUE: 19
PIF_VALUE: 1
PIF_VALUE: 25
PIF_VALUE: 15
PIF_VALUE: 23
PIF_VALUE: 26
PIF_VALUE: 1
PIF_VALUE: 24
PIF_VALUE: 1
PIF_VALUE: 26
PIF_VALUE: 1
PIF_VALUE: 28
PIF_VALUE: 21
PIF_VALUE: 26
PIF_VALUE: 23
PIF_VALUE: 26
PIF_VALUE: 27
PIF_VALUE: 20
PIF_VALUE: 27
PIF_VALUE: 23
PIF_VALUE: 1
PIF_VALUE: 23
PIF_VALUE: 20
PIF_VALUE: 26
PIF_VALUE: 20
PIF_VALUE: 26
PIF_VALUE: 22
PIF_VALUE: 4
PIF_VALUE: 25
PIF_VALUE: 28
PIF_VALUE: 21
PIF_VALUE: 20
PIF_VALUE: 24
PIF_VALUE: 15
PIF_VALUE: 19
PIF_VALUE: 29
PIF_VALUE: 23
PIF_VALUE: 15
PIF_VALUE: 26
PIF_VALUE: 26
PIF_VALUE: 20
PIF_VALUE: 1
PIF_VALUE: 19
PIF_VALUE: 26
PIF_VALUE: 22
PIF_VALUE: 24
PIF_VALUE: 22
PIF_VALUE: 26
PIF_VALUE: 26
PIF_VALUE: 24
PIF_VALUE: 15
PIF_VALUE: 20
PIF_VALUE: 26
PIF_VALUE: 1
PIF_VALUE: 22
PIF_VALUE: 23
PIF_VALUE: 25
PIF_VALUE: 13
PIF_VALUE: 21
PIF_VALUE: 19
PIF_VALUE: 25
PIF_VALUE: 21
PIF_VALUE: 15
PIF_VALUE: 24
PIF_VALUE: 20
PIF_VALUE: 15
PIF_VALUE: 25
PIF_VALUE: 24
PIF_VALUE: 19
PIF_VALUE: 24
PIF_VALUE: 21
PIF_VALUE: 25
PIF_VALUE: 26
PIF_VALUE: 15
PIF_VALUE: 17
PIF_VALUE: 25
PIF_VALUE: 15
PIF_VALUE: 15
PIF_VALUE: 27
PIF_VALUE: 23
PIF_VALUE: 19
PIF_VALUE: 28
PIF_VALUE: 25
PIF_VALUE: 23
PIF_VALUE: 21
PIF_VALUE: 15
PIF_VALUE: 23
PIF_VALUE: 28
PIF_VALUE: 23
PIF_VALUE: 20
PIF_VALUE: 23
PIF_VALUE: 23
PIF_VALUE: 15
PIF_VALUE: 29
PIF_VALUE: 23
PIF_VALUE: 26
PIF_VALUE: 26
PIF_VALUE: 24
PIF_VALUE: 15
PIF_VALUE: 25
PIF_VALUE: 25
PIF_VALUE: 14
PIF_VALUE: 23
PIF_VALUE: 24
PIF_VALUE: 21
PIF_VALUE: 23
PIF_VALUE: 26
PIF_VALUE: 23
PIF_VALUE: 21
PIF_VALUE: 18
PIF_VALUE: 24
PIF_VALUE: 26
PIF_VALUE: 20
PIF_VALUE: 23
PIF_VALUE: 24
PIF_VALUE: 15
PIF_VALUE: 17
PIF_VALUE: 29
PIF_VALUE: 29
PIF_VALUE: 19
PIF_VALUE: 23
PIF_VALUE: 20
PIF_VALUE: 24
PIF_VALUE: 19
PIF_VALUE: 25
PIF_VALUE: 21
PIF_VALUE: 19
PIF_VALUE: 21
PIF_VALUE: 2
PIF_VALUE: 15
PIF_VALUE: 25
PIF_VALUE: 23
PIF_VALUE: 15
PIF_VALUE: 23
PIF_VALUE: 28
PIF_VALUE: 23
PIF_VALUE: 26
PIF_VALUE: 26
PIF_VALUE: 19
PIF_VALUE: 15
PIF_VALUE: 21
PIF_VALUE: 24
PIF_VALUE: 23
PIF_VALUE: 0
PIF_VALUE: 24
PIF_VALUE: 15
PIF_VALUE: 26
PIF_VALUE: 24

## 2019-04-10 ASSESSMENT — PAIN - FUNCTIONAL ASSESSMENT: PAIN_FUNCTIONAL_ASSESSMENT: 0-10

## 2019-04-10 ASSESSMENT — PAIN SCALES - GENERAL
PAINLEVEL_OUTOF10: 0
PAINLEVEL_OUTOF10: 0

## 2019-04-10 ASSESSMENT — PAIN SCALES - WONG BAKER: WONGBAKER_NUMERICALRESPONSE: 0

## 2019-04-10 NOTE — PROGRESS NOTES
Pt arrived to PACU, VSS. Crepitus to chest wall and face. Anesthesia called to evaluate. Abdominal incisions c/d/i, ice applied. Pt denies pain or nausea.   Kvng Jensen RN

## 2019-04-10 NOTE — OP NOTE
Operative Note    Patient: Niranjan Elizalde  YOB: 1940  MRN: [id number]  Date of Procedure: 4/10/2019     Pre-Op Diagnosis: RIGHT INGUINAL HERNIA  Post-Op Diagnosis: Same       Procedure(s): DAVINCI RIGHT INGUINAL HERNIA REPAIR  Anesthesia: General     Surgeon(s):Chuy Rubalcava MD  Assistant: Ruma Valentine     Estimated Blood Loss (mL): less than 50   Complications: None  Specimens: No specimens in log     Findings: Direct right inguinal hernia      Implants:  Implant Name Type Inv. Item Serial No.  Lot No. LRB No. Used   MESH SURG SIA GROIN 3DMAX LG RT 4X6IN Mesh MESH SURG SIA GROIN 3DMAX LG RT 4X6IN   [employer] [id number] Right 1          Drains:   Urethral Catheter Latex 16 fr (Active)         Findings: Direct right inguinal hernia       Indications for procedure: 71 yo symptomatic and enlarging right inguinal hernia. Details of procedure: Patient was brought to the operating room after informed consent was obtained and placed on the operating room table in supine position. A state of general anesthesia was induced. Patient was prepped and draped in standard sterile fashion. Time out procedure was performed per institution protocol. 5 mm trocar was used to enter the abdomen via Optivew technique in the left abdomen just cephalad to the umbilicus. The abdomen was examined for any injury to internal organs from the trocar entry and none was found. Nest 8 mm robotic port was placed in the right abdomen in the position that was a mirror image of the first trocar. This was followed by a 12 mm robotic camera port just above the umbilicus. After this, the 5 mm laparoscopic trocar was up-sized to an 8 mm robotic trocar. The hernia defect was visualized and the robot was docked. Creation of preperitoneal flap began by incising peritoneum just lateral to the medial umbilical fold and ~ half way between the pubis and the umbilicus.   Combination of blunt and sharp dissection as well as electrocautery was used to facilitate the dissection. The flap was dissected out well past the inferior epigastric vessels and stopped just short of reaching the anterior superior iliac spine. Inferiorly, the dissection continued until the Nathaniel's ligament was identified. The hernia was identified as a direct hernia and reduced in its entirety. The round ligament was identified and preserved. A large right Bard 3DMax pre-formed mesh was introduced into the developed pre-peritoneal space and secured in place to Nathaniel's ligament with  3-0 Stratafix suture with a running stitch. A 3-0 Vicryl was used to place one anchoring stitch at the superior-lateral border of the mesh. This allowed for a flat lay of the mesh, covering all potential areas of weakness. The pre-peritoneal flap was re-approximated, using a running 3-0 Stratafix, completely excluding the mesh from the peritoneal cavity. 2 small defects were noted in the flap. These were repaired with 3-0 Vicryl sutures. The pneumoperitoneum was released. The fascial defect from the 12 mm port site was closed with 0-Vicryl stitch. Skin at the trocar sites was closed with deep dermal sutures using 3-0 Vicryl. Skin glue was applied as sterile dressing. Laps and instruments counts were correct x 2. Patient tolerated procedure well. There were no immediate complications. Dr. Charleen Solorzano was present, scrubbed, and directed the course of the entire operation from beginning to end.        Jayleen Christine MD PGY 4  General Surgery Resident

## 2019-04-10 NOTE — PROGRESS NOTES
Dr. Neisha Jimenez at bedside to assess. Admission orders to follow.  updated.   Jacquelyn Dakins RN

## 2019-04-10 NOTE — H&P
I have reviewed the history and physical by Dr Anson Penny and examined the patient. I find no relevant changes. I have reviewed with the patient and/or family members, during the preoperative office visit the risks, benefits, and alternatives to the procedure.     SALINAS [employer] STEPHENSON

## 2019-04-10 NOTE — BRIEF OP NOTE
Brief Postoperative Note  ______________________________________________________________    Patient: Kavya Foster  YOB: 1940  MRN: [id number]  Date of Procedure: 4/10/2019    Pre-Op Diagnosis: RIGHT INGUINAL HERNIA    Post-Op Diagnosis: Same       Procedure(s): Sunday Gage RIGHT INGUINAL HERNIA REPAIR    Anesthesia: General    Surgeon(s):  Jessica Livingston MD    Assistant: Charlie Fagan    Estimated Blood Loss (mL): less than 50     Complications: None    Specimens:   * No specimens in log *    Implants:  Implant Name Type Inv.  Item Serial No.  Lot No. LRB No. Used   MESH SURG SIA GROIN 3DMAX LG RT 4X6IN Mesh MESH SURG SIA GROIN 3DMAX LG RT 4X6IN  CR [employer] [id number] Right 1         Drains:   Urethral Catheter Latex 16 fr (Active)       Findings: Direct right inguinal hernia     Charlie Fagan MD  Date: 4/10/2019  Time: 5:57 PM

## 2019-04-10 NOTE — ANESTHESIA PRE PROCEDURE
of skin of trunk 2011    back    Benign familial tremor     sxs began in HS    Decreased bone density     Hip fracture ([address]     sustained in fall    Hypertension     Right inguinal hernia        Past Surgical History:        Procedure Laterality Date    DENTAL SURGERY  2013    JOINT REPLACEMENT  12    right bipolar hip replacement    OTHER SURGICAL HISTORY  10/2011 approx    mole removal from back    SKIN CANCER EXCISION  2015    squamous cell skin cancer    TONSILLECTOMY  age 13   [facility] TUBAL LIGATION      d & c done simultaneously to remove IUD       Social History:    Social History     Tobacco Use    Smoking status: Former Smoker     Packs/day: 0.25     Years: 2.00     Pack years: 0.50     Types: Cigarettes     Last attempt to quit:      Years since quittin.3    Smokeless tobacco: Never Used    Tobacco comment: smoked for 2 years when just out of high school   Substance Use Topics    Alcohol use: No                                Counseling given: Not Answered  Comment: smoked for 2 years when just out of high school      Vital Signs (Current):   Vitals:    19 1142 04/10/19 1146   BP:  (!) 162/83   Pulse:  105   Resp:  16   Temp:  98.7 °F (37.1 °C)   TempSrc:  Temporal   SpO2:  98%   Weight: 129 lb (58.5 kg) 125 lb 8 oz (56.9 kg)   Height: 5' 5\" (1.651 m) 5' 5\" (1.651 m)                                              BP Readings from Last 3 Encounters:   04/10/19 (!) 162/83   19 128/80   19 128/72       NPO Status: Time of last liquid consumption: 1000                        Time of last solid consumption: 2200                        Date of last liquid consumption: 04/10/19                        Date of last solid food consumption: 19    BMI:   Wt Readings from Last 3 Encounters:   04/10/19 125 lb 8 oz (56.9 kg)   19 129 lb 9.6 oz (58.8 kg)   19 127 lb 12.8 oz (58 kg)     Body mass index is 20.88 kg/m².     CBC:   Lab Results   Component Value Date    WBC 6.9 06/14/2018    RBC 4.25 06/14/2018    HGB 13.9 06/14/2018    HCT 40.6 06/14/2018    MCV 95.5 06/14/2018    RDW 13.6 06/14/2018     06/14/2018       CMP:   Lab Results   Component Value Date     04/10/2019    K 4.3 04/10/2019    CL 99 04/10/2019    CO2 26 04/10/2019    BUN 13 04/10/2019    CREATININE 0.7 04/10/2019    GFRAA >60 04/10/2019    GFRAA >60 01/25/2013    AGRATIO 1.9 12/12/2017    LABGLOM >60 04/10/2019    GLUCOSE 109 04/10/2019    PROT 7.2 12/12/2017    PROT 7.1 01/25/2013    CALCIUM 9.2 04/10/2019    BILITOT 0.6 12/12/2017    ALKPHOS 91 12/12/2017    AST 17 12/12/2017    ALT 17 12/12/2017       POC Tests: No results for input(s): POCGLU, POCNA, POCK, POCCL, POCBUN, POCHEMO, POCHCT in the last 72 hours. Coags:   Lab Results   Component Value Date    PROTIME 11.5 02/12/2012    INR 1.05 02/12/2012       HCG (If Applicable): No results found for: PREGTESTUR, PREGSERUM, HCG, HCGQUANT     ABGs: No results found for: PHART, PO2ART, ZEF7OJH, NWN7QCW, BEART, H9OYXDHM     Type & Screen (If Applicable):  Lab Results   Component Value Date    LABABO O 02/12/2012    [address] Positive 02/12/2012       Anesthesia Evaluation  Patient summary reviewed and Nursing notes reviewed no history of anesthetic complications:   Airway: Mallampati: III     Neck ROM: full   Dental:          Pulmonary:Negative Pulmonary ROS and normal exam                               Cardiovascular:Negative CV ROS    (+) hypertension:,                   Neuro/Psych:   Negative Neuro/Psych ROS              GI/Hepatic/Renal: Neg GI/Hepatic/Renal ROS       (-) hiatal hernia and GERD       Endo/Other: Negative Endo/Other ROS                    Abdominal:           Vascular:                                        Anesthesia Plan      general     ASA 2     (I discussed with the patient the risks and benefits of PIV, general anesthesia, IV Narcotics, PACU.   All questions were answered the patient agrees with the plan.)  Induction: intravenous. Pre-Operative Diagnosis: RIGHT INGUINAL HERNIA    70 y.o.   BMI:  Body mass index is 20.88 kg/m².      Vitals:    19 1142 04/10/19 1146   BP:  (!) 162/83   Pulse:  105   Resp:  16   Temp:  98.7 °F (37.1 °C)   TempSrc:  Temporal   SpO2:  98%   Weight: 129 lb (58.5 kg) 125 lb 8 oz (56.9 kg)   Height: 5' 5\" (1.651 m) 5' 5\" (1.651 m)       No Known Allergies    Social History     Tobacco Use    Smoking status: Former Smoker     Packs/day: 0.25     Years: 2.00     Pack years: 0.50     Types: Cigarettes     Last attempt to quit:      Years since quittin.3    Smokeless tobacco: Never Used    Tobacco comment: smoked for 2 years when just out of high school   Substance Use Topics    Alcohol use: No       LABS:    CBC  Lab Results   Component Value Date/Time    WBC 6.9 2018 10:30 AM    HGB 13.9 2018 10:30 AM    HCT 40.6 2018 10:30 AM     2018 10:30 AM     RENAL  Lab Results   Component Value Date/Time     04/10/2019 12:34 PM    K 4.3 04/10/2019 12:34 PM    CL 99 04/10/2019 12:34 PM    CO2 26 04/10/2019 12:34 PM    BUN 13 04/10/2019 12:34 PM    CREATININE 0.7 04/10/2019 12:34 PM    GLUCOSE 109 (H) 04/10/2019 12:34 PM     COAGS  Lab Results   Component Value Date/Time    PROTIME 11.5 2012 08:55 AM    INR 1.05 2012 08:55 AM         Adalid Kearns MD   4/10/2019

## 2019-04-10 NOTE — PROGRESS NOTES
Pt alert, denies pain. Crepitus improved but still present in face, chest wall, and abdomen.   Riley Branham RN

## 2019-04-11 VITALS
WEIGHT: 131.2 LBS | HEART RATE: 59 BPM | SYSTOLIC BLOOD PRESSURE: 132 MMHG | DIASTOLIC BLOOD PRESSURE: 64 MMHG | RESPIRATION RATE: 16 BRPM | OXYGEN SATURATION: 93 % | HEIGHT: 65 IN | TEMPERATURE: 98 F | BODY MASS INDEX: 21.86 KG/M2

## 2019-04-11 PROCEDURE — 99024 POSTOP FOLLOW-UP VISIT: CPT | Performed by: SURGERY

## 2019-04-11 PROCEDURE — 6360000002 HC RX W HCPCS: Performed by: STUDENT IN AN ORGANIZED HEALTH CARE EDUCATION/TRAINING PROGRAM

## 2019-04-11 PROCEDURE — 6370000000 HC RX 637 (ALT 250 FOR IP): Performed by: STUDENT IN AN ORGANIZED HEALTH CARE EDUCATION/TRAINING PROGRAM

## 2019-04-11 RX ADMIN — ENOXAPARIN SODIUM 40 MG: 40 INJECTION SUBCUTANEOUS at 09:21

## 2019-04-11 RX ADMIN — PROPRANOLOL HYDROCHLORIDE 120 MG: 60 CAPSULE, EXTENDED RELEASE ORAL at 09:21

## 2019-04-11 ASSESSMENT — PAIN SCALES - GENERAL: PAINLEVEL_OUTOF10: 0

## 2019-04-11 NOTE — ANESTHESIA POSTPROCEDURE EVALUATION
Department of Anesthesiology  Postprocedure Note    Patient: Omari Harmon  MRN: [id number]  YOB: 1940  Date of evaluation: 4/11/2019  Time:  7:59 AM     Procedure Summary     Date:  04/10/19 Room / Location:  [facility] OR [address] [place] [address] OR    Anesthesia Start:  1440 Anesthesia Stop:  6382    Procedure:  [facility] RIGHT INGUINAL HERNIA REPAIR (Right Abdomen) Diagnosis:       Unilateral inguinal hernia without obstruction or gangrene, recurrence not specified      (RIGHT INGUINAL HERNIA)    Surgeon:  Babak Maza MD Responsible Provider:  Tylor Carrillo MD    Anesthesia Type:  general ASA Status:  2          Anesthesia Type: general    Nura Phase I: Nura Score: 10    Nura Phase II:      Last vitals: Reviewed and per EMR flowsheets.        Anesthesia Post Evaluation    Patient location during evaluation: PACU  Level of consciousness: awake and alert  Airway patency: patent  Nausea & Vomiting: no nausea and no vomiting  Complications: no  Cardiovascular status: blood pressure returned to baseline  Respiratory status: acceptable  Hydration status: euvolemic  Comments: Postoperative Anesthesia Note    Name:    Omari Harmon  MRN:      [id number]    Patient Vitals in the past 12 hrs:  04/11/19 0332, Weight:131 lb 3.2 oz (59.5 kg)  04/11/19 0325, BP:114/61, Temp:97.5 °F (36.4 °C), Temp src:Oral, Pulse:71, Resp:16, SpO2:100 %  04/10/19 2348, BP:(!) 125/59, Temp:97.4 °F (36.3 °C), Temp src:Oral, Pulse:50, Resp:16, SpO2:99 %  04/10/19 2050, BP:136/64, Temp:97.5 °F (36.4 °C), Temp src:Oral, Pulse:79, Resp:16, SpO2:92 %, Height:5' 5\" (1.651 m), Weight:125 lb (56.7 kg)  04/10/19 2030, BP:126/61, Pulse:53, Resp:12, SpO2:98 %  04/10/19 2015, BP:127/82, Pulse:51, Resp:10, SpO2:98 %  04/10/19 2000, BP:127/62, Pulse:(!) 49, Resp:12, SpO2:94 %     LABS:    CBC  Lab Results       Component                Value               Date/Time                  WBC                      6.9                 06/14/2018 10:30 AM HGB                      13.9                06/14/2018 10:30 AM        HCT                      40.6                06/14/2018 10:30 AM        PLT                      193                 06/14/2018 10:30 AM   RENAL  Lab Results       Component                Value               Date/Time                  NA                       138                 04/10/2019 12:34 PM        K                        4.3                 04/10/2019 12:34 PM        CL                       99                  04/10/2019 12:34 PM        CO2                      26                  04/10/2019 12:34 PM        BUN                      13                  04/10/2019 12:34 PM        CREATININE               0.7                 04/10/2019 12:34 PM        GLUCOSE                  109 (H)             04/10/2019 12:34 PM   COAGS  Lab Results       Component                Value               Date/Time                  PROTIME                  11.5                02/12/2012 08:55 AM        INR                      1.05                02/12/2012 08:55 AM     Intake & Output:  @97XABE@    Nausea & Vomiting:  No    Level of Consciousness:  Awake    Pain Assessment:  Adequate analgesia    Anesthesia Complications:  No apparent anesthetic complications    SUMMARY      Vital signs stable  OK to discharge from Stage I post anesthesia care.   Care transferred from Anesthesiology department on discharge from perioperative area

## 2019-04-11 NOTE — PROGRESS NOTES
[facility] GENERAL SURGERY    Surgery Progress Note           POD # 1    PATIENT NAME: Zhanna Pelletier     TODAY'S DATE: 4/11/2019    INTERVAL HISTORY:    Pt  Feeling well, minimal pain, puffiness/crepitus of torso/neck rapidly subsiding, no respiratory difficulties. OBJECTIVE:   VITALS:  /64   Pulse 59   Temp 97.5 °F (36.4 °C) (Oral)   Resp 16   Ht 5' 5\" (1.651 m)   Wt 131 lb 3.2 oz (59.5 kg)   LMP 03/01/1992   SpO2 100%   BMI 21.83 kg/m²     INTAKE/OUTPUT:    I/O last 3 completed shifts: In: 2701.5 [P.O.:240; I.V.:2461.5]  Out: 325 [Urine:300; Blood:25]  No intake/output data recorded. CONSTITUTIONAL:  awake and alert  LUNGS:     ABDOMEN:    , soft, non-distended,     INCISION: clean, dry    Data:  CBC: No results for input(s): WBC, HGB, HCT, PLT in the last 72 hours. BMP:    Recent Labs     04/10/19  1234      K 4.3   CL 99   CO2 26   BUN 13   CREATININE 0.7   GLUCOSE 109*     Hepatic: No results for input(s): AST, ALT, ALB, BILITOT, ALKPHOS in the last 72 hours. Mag:    No results for input(s): MG in the last 72 hours. Phos:   No results for input(s): PHOS in the last 72 hours. INR: No results for input(s): INR in the last 72 hours.       Radiology Review:       ASSESSMENT AND PLAN:  70 y.o. female status post robotic RIH w/ mesh, postop subcutaneous emphysema due to CO2 gas, resorbing rapidly   - d/c home   - f/u 2-3 weeks         Electronically signed by Carolynn Patterson MD

## 2019-04-11 NOTE — PROGRESS NOTES
Handoff report to [employer], four eye skin assessment completed. Pt aware of room assignment.   Gerardo Ramirez RN

## 2019-04-11 NOTE — PROGRESS NOTES
Patient admitted to room 336 from PACU. Patient oriented to room, call light, bed rails, phone, lights and bathroom. Patient instructed about the schedule of the day including: vital sign frequency, lab draws, possible tests, frequency of MD and staff rounds, including RN/MD rounding together at bedside, daily weights, and I &O's. Patient instructed about prescribed diet, how to use 8MENU, and television. Telemetry box 86 in place, patient aware of placement and reason. Bed locked, in lowest position, side rails up 2/4, call light within reach. Will continue to monitor.

## 2019-04-11 NOTE — PROGRESS NOTES
Pt assessment completed and charted. Abd sx incision c/d/i. Pt denies pain. Bed in lowest position. Wheels locked. VSS. Meds given per MAR. Call right within reach. Bed side table within reach. Will continue to monitor.

## 2019-04-30 ENCOUNTER — OFFICE VISIT (OUTPATIENT)
Dept: SURGERY | Age: 79
End: 2019-04-30

## 2019-04-30 VITALS
BODY MASS INDEX: 20.59 KG/M2 | HEIGHT: 65 IN | WEIGHT: 123.6 LBS | SYSTOLIC BLOOD PRESSURE: 122 MMHG | DIASTOLIC BLOOD PRESSURE: 68 MMHG

## 2019-04-30 DIAGNOSIS — Z09 POSTOP CHECK: ICD-10-CM

## 2019-04-30 PROCEDURE — 99024 POSTOP FOLLOW-UP VISIT: CPT | Performed by: SURGERY

## 2019-04-30 NOTE — PROGRESS NOTES
Surgery Post-op Progress Note    HPI:  Notes reviewed, and agree with documentation in pt's chart. Postoperative Follow-up: Patient presents for 2 week follow-up status post robotic [facility] repair w/ mesh . Eating a regular diet without difficulty. Bowel movements are Normal.  The patient is not having any pain. .     ROS:    · 10 point review of systems performed; please refer to HPI with pertinent positives, all other ROS are negative    PE:   CONSTITUTIONAL:  awake and alert    ABDOMEN: soft, non-distended, non-tender, mild maculopapular rash around trocar sites     INCISION: clean, dry, no drainage, healing      ASSESSMENT:   Diagnosis Orders   1. Postop check     · Rash could be a reaction to the surgical glue but it didn't develop for >1 week s/p surgery. Symptoms are minimal and subsiding at this point.       PLAN:    Continue with routine wound care as discussed  Gradually increase activities as tolerated  Follow-up as needed; please call with questions or concerns  ·

## 2019-05-30 PROBLEM — Z09 POSTOP CHECK: Status: RESOLVED | Noted: 2019-04-30 | Resolved: 2019-05-30

## 2019-06-06 DIAGNOSIS — I10 ESSENTIAL HYPERTENSION: Chronic | ICD-10-CM

## 2019-06-06 NOTE — TELEPHONE ENCOUNTER
Jayna Barnett is requesting refill(s) lisinopril  Last OV 4/2/19 (pertaining to medication)  LR 2/27/19 (per medication requested)  Next office visit scheduled or attempted Yes   If no, reason:  9/5/19

## 2019-06-07 RX ORDER — LISINOPRIL 2.5 MG/1
TABLET ORAL
Qty: 90 TABLET | Refills: 1 | Status: SHIPPED | OUTPATIENT
Start: 2019-06-07 | End: 2019-11-24 | Stop reason: SDUPTHER

## 2019-08-30 DIAGNOSIS — G25.0 BENIGN FAMILIAL TREMOR: Chronic | ICD-10-CM

## 2019-08-30 RX ORDER — PROPRANOLOL HYDROCHLORIDE 120 MG/1
CAPSULE, EXTENDED RELEASE ORAL
Qty: 90 CAPSULE | Refills: 2 | Status: SHIPPED | OUTPATIENT
Start: 2019-08-30 | End: 2020-05-26

## 2019-09-05 ENCOUNTER — OFFICE VISIT (OUTPATIENT)
Dept: FAMILY MEDICINE CLINIC | Age: 79
End: 2019-09-05
Payer: MEDICARE

## 2019-09-05 VITALS
BODY MASS INDEX: 20.49 KG/M2 | SYSTOLIC BLOOD PRESSURE: 142 MMHG | HEART RATE: 52 BPM | WEIGHT: 123 LBS | HEIGHT: 65 IN | DIASTOLIC BLOOD PRESSURE: 82 MMHG | OXYGEN SATURATION: 98 %

## 2019-09-05 DIAGNOSIS — I10 ESSENTIAL HYPERTENSION: Primary | Chronic | ICD-10-CM

## 2019-09-05 DIAGNOSIS — R73.03 PREDIABETES: ICD-10-CM

## 2019-09-05 LAB
ANION GAP SERPL CALCULATED.3IONS-SCNC: 15 MMOL/L (ref 3–16)
BUN BLDV-MCNC: 15 MG/DL (ref 7–20)
CALCIUM SERPL-MCNC: 10.1 MG/DL (ref 8.3–10.6)
CHLORIDE BLD-SCNC: 94 MMOL/L (ref 99–110)
CO2: 27 MMOL/L (ref 21–32)
CREAT SERPL-MCNC: 0.8 MG/DL (ref 0.6–1.2)
GFR AFRICAN AMERICAN: >60
GFR NON-AFRICAN AMERICAN: >60
GLUCOSE BLD-MCNC: 120 MG/DL (ref 70–99)
POTASSIUM SERPL-SCNC: 4.7 MMOL/L (ref 3.5–5.1)
SODIUM BLD-SCNC: 136 MMOL/L (ref 136–145)

## 2019-09-05 PROCEDURE — 1036F TOBACCO NON-USER: CPT | Performed by: FAMILY MEDICINE

## 2019-09-05 PROCEDURE — G8427 DOCREV CUR MEDS BY ELIG CLIN: HCPCS | Performed by: FAMILY MEDICINE

## 2019-09-05 PROCEDURE — 4040F PNEUMOC VAC/ADMIN/RCVD: CPT | Performed by: FAMILY MEDICINE

## 2019-09-05 PROCEDURE — G8399 PT W/DXA RESULTS DOCUMENT: HCPCS | Performed by: FAMILY MEDICINE

## 2019-09-05 PROCEDURE — 1123F ACP DISCUSS/DSCN MKR DOCD: CPT | Performed by: FAMILY MEDICINE

## 2019-09-05 PROCEDURE — 1090F PRES/ABSN URINE INCON ASSESS: CPT | Performed by: FAMILY MEDICINE

## 2019-09-05 PROCEDURE — 36415 COLL VENOUS BLD VENIPUNCTURE: CPT | Performed by: FAMILY MEDICINE

## 2019-09-05 PROCEDURE — G8420 CALC BMI NORM PARAMETERS: HCPCS | Performed by: FAMILY MEDICINE

## 2019-09-05 PROCEDURE — 99213 OFFICE O/P EST LOW 20 MIN: CPT | Performed by: FAMILY MEDICINE

## 2019-09-05 ASSESSMENT — ENCOUNTER SYMPTOMS
COUGH: 0
CHEST TIGHTNESS: 0
RHINORRHEA: 0
EYE PAIN: 0
EYE REDNESS: 0
DIARRHEA: 0
CONSTIPATION: 0
SHORTNESS OF BREATH: 0
ABDOMINAL PAIN: 0
EYE DISCHARGE: 0
VOMITING: 0
SINUS PRESSURE: 0
WHEEZING: 0
BLOOD IN STOOL: 0

## 2019-09-06 LAB
ESTIMATED AVERAGE GLUCOSE: 122.6 MG/DL
HBA1C MFR BLD: 5.9 %

## 2019-10-28 ENCOUNTER — OFFICE VISIT (OUTPATIENT)
Dept: DERMATOLOGY | Age: 79
End: 2019-10-28
Payer: MEDICARE

## 2019-10-28 DIAGNOSIS — L57.0 ACTINIC KERATOSES: Primary | ICD-10-CM

## 2019-10-28 DIAGNOSIS — Z85.828 HISTORY OF NONMELANOMA SKIN CANCER: ICD-10-CM

## 2019-10-28 DIAGNOSIS — Z12.83 SCREENING EXAM FOR SKIN CANCER: ICD-10-CM

## 2019-10-28 PROCEDURE — G8399 PT W/DXA RESULTS DOCUMENT: HCPCS | Performed by: DERMATOLOGY

## 2019-10-28 PROCEDURE — 17003 DESTRUCT PREMALG LES 2-14: CPT | Performed by: DERMATOLOGY

## 2019-10-28 PROCEDURE — 1090F PRES/ABSN URINE INCON ASSESS: CPT | Performed by: DERMATOLOGY

## 2019-10-28 PROCEDURE — 99213 OFFICE O/P EST LOW 20 MIN: CPT | Performed by: DERMATOLOGY

## 2019-10-28 PROCEDURE — 4040F PNEUMOC VAC/ADMIN/RCVD: CPT | Performed by: DERMATOLOGY

## 2019-10-28 PROCEDURE — 17000 DESTRUCT PREMALG LESION: CPT | Performed by: DERMATOLOGY

## 2019-10-28 PROCEDURE — 1036F TOBACCO NON-USER: CPT | Performed by: DERMATOLOGY

## 2019-10-28 PROCEDURE — G8484 FLU IMMUNIZE NO ADMIN: HCPCS | Performed by: DERMATOLOGY

## 2019-10-28 PROCEDURE — G8427 DOCREV CUR MEDS BY ELIG CLIN: HCPCS | Performed by: DERMATOLOGY

## 2019-10-28 PROCEDURE — 1123F ACP DISCUSS/DSCN MKR DOCD: CPT | Performed by: DERMATOLOGY

## 2019-10-28 PROCEDURE — G8420 CALC BMI NORM PARAMETERS: HCPCS | Performed by: DERMATOLOGY

## 2019-11-24 DIAGNOSIS — I10 ESSENTIAL HYPERTENSION: Chronic | ICD-10-CM

## 2019-11-25 RX ORDER — LISINOPRIL 2.5 MG/1
TABLET ORAL
Qty: 90 TABLET | Refills: 0 | Status: SHIPPED | OUTPATIENT
Start: 2019-11-25 | End: 2020-02-21 | Stop reason: SDUPTHER

## 2020-02-20 NOTE — TELEPHONE ENCOUNTER
Yael Paz [phone] (home) [phone] (work)   is requesting refill(s) of medication Lisinopril to preferred pharmacy [address] [place] [address] 9/5/19 (pertaining to medication)   Last refill 11/25/19 (per medication requested)  Next office visit scheduled or attempted Yes  Date 3/5/20  If No, reason

## 2020-02-21 RX ORDER — LISINOPRIL 2.5 MG/1
TABLET ORAL
Qty: 90 TABLET | Refills: 0 | Status: SHIPPED | OUTPATIENT
Start: 2020-02-21 | End: 2020-05-26

## 2020-05-05 ENCOUNTER — TELEPHONE (OUTPATIENT)
Dept: FAMILY MEDICINE CLINIC | Age: 80
End: 2020-05-05

## 2020-05-05 NOTE — TELEPHONE ENCOUNTER
LM for the pt to contact the office. Pt is due for a 6 month f/u next month and an AWV towards the end of the year.

## 2020-08-24 NOTE — TELEPHONE ENCOUNTER
Hamilton Ojeda [phone] ([phone] (work)   is requesting refill(s) of medication Lisinopril to preferred pharmacy [facility] 9/5/19 (pertaining to medication)   Last refill 5/26/20 (per medication requested)  Next office visit scheduled or attempted Yes  Date 9/30/20  If No, reason       Propranolol-5/26/20

## 2020-08-25 RX ORDER — LISINOPRIL 2.5 MG/1
TABLET ORAL
Qty: 90 TABLET | Refills: 0 | Status: SHIPPED | OUTPATIENT
Start: 2020-08-25 | End: 2020-11-20

## 2020-08-25 RX ORDER — PROPRANOLOL HYDROCHLORIDE 120 MG/1
CAPSULE, EXTENDED RELEASE ORAL
Qty: 90 CAPSULE | Refills: 0 | Status: SHIPPED | OUTPATIENT
Start: 2020-08-25 | End: 2020-11-20

## 2020-10-30 ENCOUNTER — OFFICE VISIT (OUTPATIENT)
Dept: FAMILY MEDICINE CLINIC | Age: 80
End: 2020-10-30
Payer: MEDICARE

## 2020-10-30 VITALS
WEIGHT: 122 LBS | BODY MASS INDEX: 20.33 KG/M2 | OXYGEN SATURATION: 99 % | TEMPERATURE: 97.4 F | HEART RATE: 88 BPM | SYSTOLIC BLOOD PRESSURE: 138 MMHG | DIASTOLIC BLOOD PRESSURE: 82 MMHG | HEIGHT: 65 IN

## 2020-10-30 LAB
ANION GAP SERPL CALCULATED.3IONS-SCNC: 12 MMOL/L (ref 3–16)
BUN BLDV-MCNC: 17 MG/DL (ref 7–20)
CALCIUM SERPL-MCNC: 9.7 MG/DL (ref 8.3–10.6)
CHLORIDE BLD-SCNC: 97 MMOL/L (ref 99–110)
CO2: 27 MMOL/L (ref 21–32)
CREAT SERPL-MCNC: 0.8 MG/DL (ref 0.6–1.2)
GFR AFRICAN AMERICAN: >60
GFR NON-AFRICAN AMERICAN: >60
GLUCOSE BLD-MCNC: 109 MG/DL (ref 70–99)
POTASSIUM SERPL-SCNC: 5 MMOL/L (ref 3.5–5.1)
SODIUM BLD-SCNC: 136 MMOL/L (ref 136–145)

## 2020-10-30 PROCEDURE — 36415 COLL VENOUS BLD VENIPUNCTURE: CPT | Performed by: FAMILY MEDICINE

## 2020-10-30 PROCEDURE — G0438 PPPS, INITIAL VISIT: HCPCS | Performed by: FAMILY MEDICINE

## 2020-10-30 PROCEDURE — G8484 FLU IMMUNIZE NO ADMIN: HCPCS | Performed by: FAMILY MEDICINE

## 2020-10-30 PROCEDURE — 1123F ACP DISCUSS/DSCN MKR DOCD: CPT | Performed by: FAMILY MEDICINE

## 2020-10-30 PROCEDURE — 4040F PNEUMOC VAC/ADMIN/RCVD: CPT | Performed by: FAMILY MEDICINE

## 2020-10-30 ASSESSMENT — PATIENT HEALTH QUESTIONNAIRE - PHQ9
2. FEELING DOWN, DEPRESSED OR HOPELESS: 0
SUM OF ALL RESPONSES TO PHQ9 QUESTIONS 1 & 2: 0
SUM OF ALL RESPONSES TO PHQ QUESTIONS 1-9: 0
1. LITTLE INTEREST OR PLEASURE IN DOING THINGS: 0
SUM OF ALL RESPONSES TO PHQ QUESTIONS 1-9: 0
SUM OF ALL RESPONSES TO PHQ QUESTIONS 1-9: 0

## 2020-10-30 ASSESSMENT — LIFESTYLE VARIABLES: HOW OFTEN DO YOU HAVE A DRINK CONTAINING ALCOHOL: 0

## 2020-10-30 NOTE — PROGRESS NOTES
Medicare Annual Wellness Visit  Name: Maddison Cuadra Date: 10/30/2020   MRN: <[id number]> Sex: Female   Age: 68 y.o. Ethnicity: Non-/Non    : 1940 Race: White      Lucy Wren is here for Medicare AWV (Patient is here for a Medicare Wellness Visit) and Hypertension (Patient is fasting for blood work.)    Screenings for behavioral, psychosocial and functional/safety risks, and cognitive dysfunction are all negative except as indicated below. These results, as well as other patient data from the [address] form, are documented in Flowsheets linked to this Encounter. Fasting for blood work. States doing fine on her blood pressure medicine and tremor medicine has been working on diet and exercise  No Known Allergies    Prior to Visit Medications    Medication Sig Taking?  Authorizing Provider   propranolol (INDERAL LA) 120 MG extended release capsule TAKE 1 CAPSULE BY MOUTH ONE TIME A DAY  Yes Staci Ruiz, DO   lisinopril (PRINIVIL;ZESTRIL) 2.5 MG tablet TAKE 1 TABLET BY MOUTH ONE TIME A DAY  Yes Staci Ruiz DO   Omega-3 Fatty Acids (FISH OIL PO) Take 1 capsule by mouth daily Yes Historical Provider, MD   Multiple Vitamins-Minerals (MULTIVITAMIN ADULT PO) Take 1 tablet by mouth daily Yes Historical Provider, MD       Past Medical History:   Diagnosis Date    Arthritis     Basal cell carcinoma of skin of trunk 2011    back    Benign familial tremor     sxs began in     Decreased bone density     Hip fracture ([facility] [address]     sustained in fall    Hypertension     Right inguinal hernia        Past Surgical History:   Procedure Laterality Date    DENTAL SURGERY  2013    HERNIA REPAIR Right 4/10/2019    DAVINCI RIGHT INGUINAL HERNIA REPAIR performed by Radha Brown MD at [address] [place] [address]  12    right bipolar hip replacement    OTHER SURGICAL HISTORY  10/2011 approx    mole removal from back   [address] [place] [address]  2015 auscultation bilaterally- no wheezes, rales or rhonchi, normal air movement, no respiratory distress  Cardiovascular: normal rate, regular rhythm, normal S1 and S2, no murmurs, rubs, clicks, or gallops, distal pulses intact, no carotid bruits  Abdomen: soft, non-tender, non-distended, normal bowel sounds, no masses or organomegaly  Extremities: no cyanosis, clubbing or edema  Musculoskeletal: normal range of motion, no joint swelling, deformity or tenderness  Neurologic: reflexes normal and symmetric, no cranial nerve deficit, gait, coordination and speech normal    Patient's complete Health Risk Assessment and screening values have been reviewed and are found in Flowsheets. The following problems were reviewed today and where indicated follow up appointments were made and/or referrals ordered. Positive Risk Factor Screenings with Interventions:     General Health and ACP:  General  In general, how would you say your health is?: Excellent  In the past 7 days, have you experienced any of the following?  New or Increased Pain, New or Increased Fatigue, Loneliness, Social Isolation, Stress or Anger?: None of These  Do you get the social and emotional support that you need?: Yes  Do you have a Living Will?: Yes  Advance Directives     Power of [address] [place] [address] Will ACP-Advance Directive ACP-Power of     Not on File Not on File Not on File Not on File      General Health Risk Interventions:  · No Living Will: Patient referred to to her     Health Habits/Nutrition:  Health Habits/Nutrition  Do you exercise for at least 20 minutes 2-3 times per week?: Yes  Have you lost any weight without trying in the past 3 months?: No  Do you eat fewer than 2 meals per day?: No  Have you seen a dentist within the past year?: (!) No  Body mass index: 20.3  Health Habits/Nutrition Interventions:  · Dental exam overdue:  due to COVID    Hearing/Vision:  No exam data present  Hearing/Vision  Do you or your family notice any

## 2020-10-31 LAB
ESTIMATED AVERAGE GLUCOSE: 119.8 MG/DL
HBA1C MFR BLD: 5.8 %

## 2020-11-20 RX ORDER — PROPRANOLOL HYDROCHLORIDE 120 MG/1
CAPSULE, EXTENDED RELEASE ORAL
Qty: 90 CAPSULE | Refills: 1 | Status: SHIPPED | OUTPATIENT
Start: 2020-11-20 | End: 2021-05-21

## 2020-11-20 RX ORDER — LISINOPRIL 2.5 MG/1
TABLET ORAL
Qty: 90 TABLET | Refills: 1 | Status: SHIPPED | OUTPATIENT
Start: 2020-11-20 | End: 2021-05-21

## 2020-11-20 NOTE — TELEPHONE ENCOUNTER
Cleopatra Chavez is requesting refill(s) lisinopril  Last OV 10/30/20 (pertaining to medication)  LR 8/25/20 (per medication requested)  Next office visit scheduled or attempted No   If no, reason:      Cleopatra Chavez is requesting refill(s) propranolol  Last OV 10/30/20 (pertaining to medication)  LR 8/25/20 (per medication requested)  Next office visit scheduled or attempted No   If no, reason:

## 2021-05-21 DIAGNOSIS — G25.0 BENIGN FAMILIAL TREMOR: Chronic | ICD-10-CM

## 2021-05-21 DIAGNOSIS — I10 ESSENTIAL HYPERTENSION: Chronic | ICD-10-CM

## 2021-05-21 RX ORDER — PROPRANOLOL HYDROCHLORIDE 120 MG/1
CAPSULE, EXTENDED RELEASE ORAL
Qty: 90 CAPSULE | Refills: 0 | Status: SHIPPED | OUTPATIENT
Start: 2021-05-21 | End: 2021-08-18

## 2021-05-21 RX ORDER — LISINOPRIL 2.5 MG/1
TABLET ORAL
Qty: 90 TABLET | Refills: 0 | Status: SHIPPED | OUTPATIENT
Start: 2021-05-21 | End: 2021-08-18

## 2021-05-21 NOTE — TELEPHONE ENCOUNTER
Sola Sanchez [phone] (home) [phone] (work)   is requesting refill(s) of medication Lisinopril to preferred pharmacy [address] [place] [address] 10/30/20 (pertaining to medication)   Last refill 11/20/20 (per medication requested)  Next office visit scheduled or attempted No  Date   If No, reason Due in October     Propranolol-11/20/20

## 2021-05-21 NOTE — TELEPHONE ENCOUNTER
Patient's  called regarding the Propranolol and Lisinopril prescriptions. I advised the prescription refill request has been received. He said patient is out of medication and would like it to be filled today. He apologizes for the late notice.

## 2021-08-16 DIAGNOSIS — I10 ESSENTIAL HYPERTENSION: Chronic | ICD-10-CM

## 2021-08-16 DIAGNOSIS — G25.0 BENIGN FAMILIAL TREMOR: Chronic | ICD-10-CM

## 2021-08-17 NOTE — TELEPHONE ENCOUNTER
Cleopatra Chavez is requesting refill(s) lisinopril, propranolol  Last OV 10/30/20 (pertaining to medication)  LR 5/21/21 (per medication requested)  Next office visit scheduled or attempted Yes   If no, reason:  9/7/21

## 2021-08-18 RX ORDER — LISINOPRIL 2.5 MG/1
TABLET ORAL
Qty: 90 TABLET | Refills: 0 | Status: SHIPPED | OUTPATIENT
Start: 2021-08-18 | End: 2021-09-24 | Stop reason: SDUPTHER

## 2021-08-18 RX ORDER — PROPRANOLOL HYDROCHLORIDE 120 MG/1
CAPSULE, EXTENDED RELEASE ORAL
Qty: 90 CAPSULE | Refills: 0 | Status: SHIPPED | OUTPATIENT
Start: 2021-08-18 | End: 2021-09-24 | Stop reason: SDUPTHER

## 2021-09-24 ENCOUNTER — OFFICE VISIT (OUTPATIENT)
Dept: FAMILY MEDICINE CLINIC | Age: 81
End: 2021-09-24
Payer: MEDICARE

## 2021-09-24 VITALS
SYSTOLIC BLOOD PRESSURE: 122 MMHG | HEART RATE: 54 BPM | DIASTOLIC BLOOD PRESSURE: 78 MMHG | WEIGHT: 122.2 LBS | HEIGHT: 65 IN | OXYGEN SATURATION: 97 % | BODY MASS INDEX: 20.36 KG/M2

## 2021-09-24 DIAGNOSIS — G25.0 BENIGN FAMILIAL TREMOR: Chronic | ICD-10-CM

## 2021-09-24 DIAGNOSIS — Z11.59 ENCOUNTER FOR HEPATITIS C SCREENING TEST FOR LOW RISK PATIENT: ICD-10-CM

## 2021-09-24 DIAGNOSIS — R73.03 PREDIABETES: ICD-10-CM

## 2021-09-24 DIAGNOSIS — I10 ESSENTIAL HYPERTENSION: Primary | ICD-10-CM

## 2021-09-24 LAB
ANION GAP SERPL CALCULATED.3IONS-SCNC: 15 MMOL/L (ref 3–16)
BUN BLDV-MCNC: 18 MG/DL (ref 7–20)
CALCIUM SERPL-MCNC: 9.8 MG/DL (ref 8.3–10.6)
CHLORIDE BLD-SCNC: 96 MMOL/L (ref 99–110)
CO2: 24 MMOL/L (ref 21–32)
CREAT SERPL-MCNC: 0.7 MG/DL (ref 0.6–1.2)
GFR AFRICAN AMERICAN: >60
GFR NON-AFRICAN AMERICAN: >60
GLUCOSE BLD-MCNC: 111 MG/DL (ref 70–99)
HEPATITIS C ANTIBODY INTERPRETATION: NORMAL
POTASSIUM SERPL-SCNC: 4.6 MMOL/L (ref 3.5–5.1)
SODIUM BLD-SCNC: 135 MMOL/L (ref 136–145)

## 2021-09-24 PROCEDURE — 1036F TOBACCO NON-USER: CPT | Performed by: FAMILY MEDICINE

## 2021-09-24 PROCEDURE — 4040F PNEUMOC VAC/ADMIN/RCVD: CPT | Performed by: FAMILY MEDICINE

## 2021-09-24 PROCEDURE — G8427 DOCREV CUR MEDS BY ELIG CLIN: HCPCS | Performed by: FAMILY MEDICINE

## 2021-09-24 PROCEDURE — G8399 PT W/DXA RESULTS DOCUMENT: HCPCS | Performed by: FAMILY MEDICINE

## 2021-09-24 PROCEDURE — 36415 COLL VENOUS BLD VENIPUNCTURE: CPT | Performed by: FAMILY MEDICINE

## 2021-09-24 PROCEDURE — 1090F PRES/ABSN URINE INCON ASSESS: CPT | Performed by: FAMILY MEDICINE

## 2021-09-24 PROCEDURE — 99214 OFFICE O/P EST MOD 30 MIN: CPT | Performed by: FAMILY MEDICINE

## 2021-09-24 PROCEDURE — G8420 CALC BMI NORM PARAMETERS: HCPCS | Performed by: FAMILY MEDICINE

## 2021-09-24 PROCEDURE — 1123F ACP DISCUSS/DSCN MKR DOCD: CPT | Performed by: FAMILY MEDICINE

## 2021-09-24 RX ORDER — PROPRANOLOL HYDROCHLORIDE 120 MG/1
CAPSULE, EXTENDED RELEASE ORAL
Qty: 90 CAPSULE | Refills: 1 | Status: SHIPPED | OUTPATIENT
Start: 2021-09-24 | End: 2022-05-16

## 2021-09-24 RX ORDER — LISINOPRIL 2.5 MG/1
TABLET ORAL
Qty: 90 TABLET | Refills: 1 | Status: SHIPPED | OUTPATIENT
Start: 2021-09-24 | End: 2022-05-16

## 2021-09-24 SDOH — ECONOMIC STABILITY: FOOD INSECURITY: WITHIN THE PAST 12 MONTHS, YOU WORRIED THAT YOUR FOOD WOULD RUN OUT BEFORE YOU GOT MONEY TO BUY MORE.: NEVER TRUE

## 2021-09-24 SDOH — ECONOMIC STABILITY: FOOD INSECURITY: WITHIN THE PAST 12 MONTHS, THE FOOD YOU BOUGHT JUST DIDN'T LAST AND YOU DIDN'T HAVE MONEY TO GET MORE.: NEVER TRUE

## 2021-09-24 ASSESSMENT — PATIENT HEALTH QUESTIONNAIRE - PHQ9
SUM OF ALL RESPONSES TO PHQ QUESTIONS 1-9: 0
SUM OF ALL RESPONSES TO PHQ9 QUESTIONS 1 & 2: 0
SUM OF ALL RESPONSES TO PHQ QUESTIONS 1-9: 0
SUM OF ALL RESPONSES TO PHQ QUESTIONS 1-9: 0
1. LITTLE INTEREST OR PLEASURE IN DOING THINGS: 0
2. FEELING DOWN, DEPRESSED OR HOPELESS: 0

## 2021-09-24 ASSESSMENT — ENCOUNTER SYMPTOMS
DIARRHEA: 0
RHINORRHEA: 0
COUGH: 0
SINUS PRESSURE: 0
CONSTIPATION: 0
VOMITING: 0
SHORTNESS OF BREATH: 0
BLOOD IN STOOL: 0
EYE DISCHARGE: 0
WHEEZING: 0
EYE PAIN: 0
EYE REDNESS: 0
ABDOMINAL PAIN: 0
CHEST TIGHTNESS: 0

## 2021-09-24 ASSESSMENT — SOCIAL DETERMINANTS OF HEALTH (SDOH): HOW HARD IS IT FOR YOU TO PAY FOR THE VERY BASICS LIKE FOOD, HOUSING, MEDICAL CARE, AND HEATING?: NOT HARD AT ALL

## 2021-09-24 NOTE — PROGRESS NOTES
Subjective:      Patient ID: Jerome Hancock is a 80 y.o. female. HPI     Chief Complaint   Patient presents with    Hypertension     Patient is here for a 6 month followup, she is fasting for blood work. Here for checkup on HTN,prediabetes. Non smoker.   does not have any dizziness, lightheadedness or syncope from her blood pressure medicine.  Does try to adhere to a healthy diet of low salt and low fat.  Has long history of benign familial tremors.  Had seen a neurologist in the past.  Has tried medications but had side effects stopped.  States it is difficult for her to do things due to the tremoring in her hands.  Her father had similar issue.   does not try to change her diet.  Has not had any weight gain.   has not had her bone density scan in the last few years. Jerome Hancock is a 80 y.o. female with the following history as recorded in [facility]:  Patient Active Problem List    Diagnosis Date Noted    S/P right inguinal hernia repair 04/10/2019    Right groin hernia 03/05/2019    Prediabetes 06/14/2018    Osteopenia 12/04/2015    Essential hypertension 09/16/2015    Hip fracture, right ([address] 02/12/2012    Benign familial tremor 06/24/2010    Family history of breast cancer 06/24/2010     Current Outpatient Medications   Medication Sig Dispense Refill    lisinopril (PRINIVIL;ZESTRIL) 2.5 MG tablet TAKE 1 TABLET BY MOUTH EVERY DAY  90 tablet 0    propranolol (INDERAL LA) 120 MG extended release capsule TAKE 1 CAPSULE BY MOUTH EVERY DAY  90 capsule 0    Omega-3 Fatty Acids (FISH OIL PO) Take 1 capsule by mouth daily      Multiple Vitamins-Minerals (MULTIVITAMIN ADULT PO) Take 1 tablet by mouth daily       No current facility-administered medications for this visit. Allergies: Patient has no known allergies.   Past Medical History:   Diagnosis Date    Arthritis     Basal cell carcinoma of skin of trunk 5/2011    back    Benign familial tremor     sxs began in HS    Decreased bone density     Hip fracture ([facility] [address]     sustained in fall    Hypertension     Right inguinal hernia      Past Surgical History:   Procedure Laterality Date    DENTAL SURGERY  2013    HERNIA REPAIR Right 4/10/2019    DAVINCI RIGHT INGUINAL HERNIA REPAIR performed by Kylah Ruiz MD at [facility]  12    right bipolar hip replacement    OTHER SURGICAL HISTORY  10/2011 approx    mole removal from back    SKIN CANCER EXCISION  2015    squamous cell skin cancer    TONSILLECTOMY  age 13   [facility] TUBAL LIGATION      d & c done simultaneously to remove IUD     Family History   Problem Relation Age of Onset    High Blood Pressure Mother     Arthritis Mother     Parkinsonism Mother         Parkinson's Disease    Heart Disease Father     High Blood Pressure Father     Cancer Sister         breast & bladder carcinoma    High Blood Pressure Sister     Other Sister         benign familial tremor    Cancer Sister         brain    Cancer Sister         breast    Other Brother         benign familial tremor    High Blood Pressure Son     Diabetes Paternal Grandfather      Social History     Tobacco Use    Smoking status: Former Smoker     Packs/day: 0.25     Years: 2.00     Pack years: 0.50     Types: Cigarettes     Quit date:      Years since quittin.7    Smokeless tobacco: Never Used    Tobacco comment: smoked for 2 years when just out of high school   Substance Use Topics    Alcohol use: No       Review of Systems   Constitutional: Negative for fatigue, fever and unexpected weight change. HENT: Negative for ear discharge, ear pain, hearing loss, rhinorrhea, sinus pressure and tinnitus. Eyes: Negative for pain, discharge, redness and visual disturbance. Respiratory: Negative for cough, chest tightness, shortness of breath and wheezing. Cardiovascular: Negative for chest pain and palpitations.    Gastrointestinal: Negative for abdominal pain, blood in stool, constipation, diarrhea and vomiting. Genitourinary: Negative for difficulty urinating, dysuria and hematuria. Neurological: Negative for dizziness, seizures, syncope and headaches. Psychiatric/Behavioral: Negative for dysphoric mood and sleep disturbance. The patient is not nervous/anxious. Objective:   Physical Exam  Constitutional:       General: She is not in acute distress. Appearance: She is well-developed. HENT:      Head: Normocephalic. Mouth/Throat:      Pharynx: No oropharyngeal exudate. Cardiovascular:      Rate and Rhythm: Normal rate and regular rhythm. Heart sounds: Normal heart sounds. No murmur heard. Pulmonary:      Effort: Pulmonary effort is normal.      Breath sounds: Normal breath sounds. No wheezing. Abdominal:      General: Bowel sounds are normal. There is no distension. Palpations: Abdomen is soft. Tenderness: There is no abdominal tenderness. There is no guarding or rebound. Musculoskeletal:      Cervical back: Neck supple. Skin:     General: Skin is warm. Neurological:      Mental Status: She is oriented to person, place, and time.    Psychiatric:         Behavior: Behavior normal.         Assessment:      htn- controlled  preDM- check labs  Benign familial tremor-unchanged      Plan:      Orders Placed This Encounter   Procedures    HEPATITIS C ANTIBODY    HEMOGLOBIN A1C    BASIC METABOLIC PANEL     Orders Placed This Encounter   Medications    lisinopril (PRINIVIL;ZESTRIL) 2.5 MG tablet     Sig: TAKE 1 TABLET BY MOUTH EVERY DAY     Dispense:  90 tablet     Refill:  1    propranolol (INDERAL LA) 120 MG extended release capsule     Sig: TAKE 1 CAPSULE BY MOUTH EVERY DAY     Dispense:  90 capsule     Refill:  1             ZANE Olvera 197 DO DANIELA

## 2021-09-24 NOTE — PATIENT INSTRUCTIONS

## 2021-09-25 LAB
ESTIMATED AVERAGE GLUCOSE: 119.8 MG/DL
HBA1C MFR BLD: 5.8 %

## 2021-12-21 ENCOUNTER — TELEPHONE (OUTPATIENT)
Dept: FAMILY MEDICINE CLINIC | Age: 81
End: 2021-12-21

## 2021-12-21 NOTE — TELEPHONE ENCOUNTER
Left message for pt to call back, she is due for the Annual Medicare Wellness visit over the phone, please schedule with DR. Ruiz before the end of the year if possible.

## 2022-01-04 ENCOUNTER — VIRTUAL VISIT (OUTPATIENT)
Dept: FAMILY MEDICINE CLINIC | Age: 82
End: 2022-01-04
Payer: MEDICARE

## 2022-01-04 DIAGNOSIS — Z00.00 ROUTINE GENERAL MEDICAL EXAMINATION AT A HEALTH CARE FACILITY: Primary | ICD-10-CM

## 2022-01-04 PROCEDURE — 4040F PNEUMOC VAC/ADMIN/RCVD: CPT | Performed by: FAMILY MEDICINE

## 2022-01-04 PROCEDURE — 1123F ACP DISCUSS/DSCN MKR DOCD: CPT | Performed by: FAMILY MEDICINE

## 2022-01-04 PROCEDURE — G0439 PPPS, SUBSEQ VISIT: HCPCS | Performed by: FAMILY MEDICINE

## 2022-01-04 RX ORDER — MULTIVIT-MIN/IRON/FOLIC ACID/K 18-600-40
CAPSULE ORAL
COMMUNITY

## 2022-01-04 ASSESSMENT — PATIENT HEALTH QUESTIONNAIRE - PHQ9
1. LITTLE INTEREST OR PLEASURE IN DOING THINGS: 0
SUM OF ALL RESPONSES TO PHQ QUESTIONS 1-9: 0
SUM OF ALL RESPONSES TO PHQ QUESTIONS 1-9: 0
SUM OF ALL RESPONSES TO PHQ9 QUESTIONS 1 & 2: 0
2. FEELING DOWN, DEPRESSED OR HOPELESS: 0
SUM OF ALL RESPONSES TO PHQ QUESTIONS 1-9: 0
SUM OF ALL RESPONSES TO PHQ QUESTIONS 1-9: 0

## 2022-01-04 ASSESSMENT — LIFESTYLE VARIABLES: HOW OFTEN DO YOU HAVE A DRINK CONTAINING ALCOHOL: 0

## 2022-01-04 NOTE — PATIENT INSTRUCTIONS
Personalized Preventive Plan for Haley Lund - 1/4/2022  Medicare offers a range of preventive health benefits. Some of the tests and screenings are paid in full while other may be subject to a deductible, co-insurance, and/or copay. Some of these benefits include a comprehensive review of your medical history including lifestyle, illnesses that may run in your family, and various assessments and screenings as appropriate. After reviewing your medical record and screening and assessments performed today your provider may have ordered immunizations, labs, imaging, and/or referrals for you. A list of these orders (if applicable) as well as your Preventive Care list are included within your After Visit Summary for your review. Other Preventive Recommendations:    · A preventive eye exam performed by an eye specialist is recommended every 1-2 years to screen for glaucoma; cataracts, macular degeneration, and other eye disorders. · A preventive dental visit is recommended every 6 months. · Try to get at least 150 minutes of exercise per week or 10,000 steps per day on a pedometer . · Order or download the FREE \"Exercise & Physical Activity: Your Everyday Guide\" from [employer]. Call 5-359.431.4476 or search [employer] online. · You need 4156-8069 mg of calcium and 2271-9798 IU of vitamin D per day. It is possible to meet your calcium requirement with diet alone, but a vitamin D supplement is usually necessary to meet this goal.  · When exposed to the sun, use a sunscreen that protects against both UVA and UVB radiation with an SPF of 30 or greater. Reapply every 2 to 3 hours or after sweating, drying off with a towel, or swimming. · Always wear a seat belt when traveling in a car. Always wear a helmet when riding a bicycle or motorcycle. Personalized Preventive Plan for Haley Lund - 1/4/2022  Medicare offers a range of preventive health benefits.  Some of the tests and screenings are paid in full while other may be subject to a deductible, co-insurance, and/or copay. Some of these benefits include a comprehensive review of your medical history including lifestyle, illnesses that may run in your family, and various assessments and screenings as appropriate. After reviewing your medical record and screening and assessments performed today your provider may have ordered immunizations, labs, imaging, and/or referrals for you. A list of these orders (if applicable) as well as your Preventive Care list are included within your After Visit Summary for your review. Other Preventive Recommendations:    A preventive eye exam performed by an eye specialist is recommended every 1-2 years to screen for glaucoma; cataracts, macular degeneration, and other eye disorders. A preventive dental visit is recommended every 6 months. Try to get at least 150 minutes of exercise per week or 10,000 steps per day on a pedometer . Order or download the FREE \"Exercise & Physical Activity: Your Everyday Guide\" from [employer]. Call 7-185.642.3219 or search [employer] online. You need 3060-8670 mg of calcium and 9830-6154 IU of vitamin D per day. It is possible to meet your calcium requirement with diet alone, but a vitamin D supplement is usually necessary to meet this goal.  When exposed to the sun, use a sunscreen that protects against both UVA and UVB radiation with an SPF of 30 or greater. Reapply every 2 to 3 hours or after sweating, drying off with a towel, or swimming. Always wear a seat belt when traveling in a car. Always wear a helmet when riding a bicycle or motorcycle.

## 2022-01-04 NOTE — PROGRESS NOTES
.    Medicare Annual Wellness Visit  Name: Johnathan Hernandez Date: 2022   MRN: <[id number]> Sex: Female   Age: 80 y.o. Ethnicity: Non- / Non    : 1940 Race: White (non-)      Manny Burnett is here for Medicare AWV    Screenings for behavioral, psychosocial and functional/safety risks, and cognitive dysfunction are all negative except as indicated below. These results, as well as other patient data from the [address] form, are documented in Flowsheets linked to this Encounter. No Known Allergies    Prior to Visit Medications    Medication Sig Taking?  Authorizing Provider   Cholecalciferol (VITAMIN D) 50 MCG (2000) CAPS capsule Take by mouth Yes Historical Provider, MD   lisinopril (PRINIVIL;ZESTRIL) 2.5 MG tablet TAKE 1 TABLET BY MOUTH EVERY DAY  Staci Ruiz DO   propranolol (INDERAL LA) 120 MG extended release capsule TAKE 1 CAPSULE BY MOUTH EVERY DAY  Staci Ruiz DO   Omega-3 Fatty Acids (FISH OIL PO) Take 1 capsule by mouth daily  Historical Provider, MD   Multiple Vitamins-Minerals (MULTIVITAMIN ADULT PO) Take 1 tablet by mouth daily  Historical Provider, MD       Past Medical History:   Diagnosis Date    Arthritis     Basal cell carcinoma of skin of trunk 2011    back    Benign familial tremor     sxs began in     Decreased bone density     Hip fracture ([facility] [address]     sustained in fall    Hypertension     Right inguinal hernia        Past Surgical History:   Procedure Laterality Date    DENTAL SURGERY  2013    HERNIA REPAIR Right 4/10/2019    DAVINCI RIGHT INGUINAL HERNIA REPAIR performed by Silvestre Mays MD at [address] [place] [address]  12    right bipolar hip replacement    OTHER SURGICAL HISTORY  10/2011 approx    mole removal from back    SKIN CANCER EXCISION  2015    squamous cell skin cancer    TONSILLECTOMY  age 12    TUBAL LIGATION      d & c done simultaneously to remove IUD       Family History Problem Relation Age of Onset    High Blood Pressure Mother     Arthritis Mother     Parkinsonism Mother         Parkinson's Disease    Heart Disease Father     High Blood Pressure Father     Cancer Sister         breast & bladder carcinoma    High Blood Pressure Sister     Other Sister         benign familial tremor    Cancer Sister         brain    Cancer Sister         breast    Other Brother         benign familial tremor    High Blood Pressure Son     Diabetes Paternal Grandfather        CareTeam (Including outside providers/suppliers regularly involved in providing care):   Patient Care Team:  Belinda Gray DO as PCP - General (Family Medicine)  Staci Zamorano DO as PCP - [address] [place] [address] [facility]  Ashutosh Clifford MD (General Surgery)    Wt Readings from Last 3 Encounters:   09/24/21 122 lb 3.2 oz (55.4 kg)   10/30/20 122 lb (55.3 kg)   09/05/19 123 lb (55.8 kg)     BMI 20.34 kg/m²    Based upon direct observation of the patient, evaluation of cognition reveals recent and remote memory intact. Patient's complete Health Risk Assessment and screening values have been reviewed and are found in Flowsheets. The following problems were reviewed today and where indicated follow up appointments were made and/or referrals ordered. Positive Risk Factor Screenings with Interventions:          General Health and ACP:  General  In general, how would you say your health is?: Very Good  In the past 7 days, have you experienced any of the following?  New or Increased Pain, New or Increased Fatigue, Loneliness, Social Isolation, Stress or Anger?: None of These  Do you get the social and emotional support that you need?: Yes  Do you have a Living Will?: (!) No  Advance Directives     Power of [address] [place] [address] Street Will ACP-Advance Directive ACP-Power of     Not on File Not on File Not on File Not on File      General Health Risk Interventions:  · No Living Will: Advance Care Planning addressed with patient today    Health Habits/Nutrition:  Health Habits/Nutrition  Do you exercise for at least 20 minutes 2-3 times per week?: Yes  Have you lost any weight without trying in the past 3 months?: No  Do you eat only one meal per day?: No  Have you seen the dentist within the past year?: (!) No     Health Habits/Nutrition Interventions:  · Dental exam overdue:  patient encouraged to make appointment with his/her dentist    Hearing/Vision:  No exam data present  Hearing/Vision  Do you or your family notice any trouble with your hearing that hasn't been managed with hearing aids?: No  Do you have difficulty driving, watching TV, or doing any of your daily activities because of your eyesight?: No  Have you had an eye exam within the past year?: (!) No  Hearing/Vision Interventions:  · Vision concerns:  patient encouraged to make appointment with his/her eye specialist     ADL:  ADLs  In the past 7 days, did you need help from others to perform any of the following everyday activities? Eating, dressing, grooming, bathing, toileting, or walking/balance?: None  In the past 7 days, did you need help from others to take care of any of the following?  Laundry, housekeeping, banking/finances, shopping, telephone use, food preparation, transportation, or taking medications?: (!) Telephone Use,Transportation (tremors make it difficult to make calls hit buttons/ , pt does not drive)  ADL Interventions:  · Patient declines any further evaluation/treatment for this issue    Personalized Preventive Plan   Current Health Maintenance Status  Immunization History   Administered Date(s) Administered    COVID-19, J&J, PF, 0.5 mL 03/06/2021    Influenza Vaccine, unspecified formulation 12/14/2016, 10/01/2017    Influenza Virus Vaccine 11/01/2012, 11/04/2013, 10/14/2015    Influenza, High Dose (Fluzone 65 yrs and older) 09/10/2014, 10/29/2018    Influenza, Quadv, adjuvanted, 65 yrs +, IM, PF (Fluad) 09/06/2020    Pneumococcal Conjugate 13-valent ([id number]) 12/04/2015    Pneumococcal Polysaccharide ([id number]) 03/20/2007    Td, unspecified formulation 10/20/2004    Tdap (Boostrix, Adacel) 08/19/2015        Health Maintenance   Topic Date Due    Shingles Vaccine (1 of 2) Never done    COVID-19 Vaccine (2 - Booster for [email or web address] series) 05/01/2021    Flu vaccine (1) 09/01/2021    Annual Wellness Visit (AWV)  10/31/2021    Depression Screen  09/24/2022    Potassium monitoring  09/24/2022    Creatinine monitoring  09/24/2022    DTaP/Tdap/Td vaccine (2 - Td or Tdap) 08/19/2025    DEXA (modify frequency per FRAX score)  Completed    Pneumococcal 65+ years Vaccine  Completed    Hepatitis C screen  Completed    Hepatitis A vaccine  Aged Out    Hepatitis B vaccine  Aged Out    Hib vaccine  Aged Out    Meningococcal (ACWY) vaccine  Aged Out     Recommendations for [place] Due: see orders and patient instructions/AVS.  . Recommended screening schedule for the next 5-10 years is provided to the patient in written form: see Patient Instructions/AVS.    IFrank LPN, 0/8/1487, performed the documented evaluation under the direct supervision of the attending physician. Reji Aaliyah, was evaluated through a synchronous (real-time) telephone encounter. The patient (or guardian if applicable) is aware that this is a billable service. Verbal consent to proceed has been obtained within the past 12 months. The visit was conducted pursuant to the emergency declaration under the [address] [place] [address], [address] [place] [address] authority and the [employer] and [employer] General Act. Patient identification was verified, and a caregiver was present when appropriate. The patient was located in a state where the provider was credentialed to provide care.     Total time spent for this encounter 30 mins    --Frank Cohen LPN on 1/7/2604 at 7:30 PM    An electronic signature was used to authenticate this note. This encounter was performed under my, Liam Ronquillo, direct supervision, 1/4/2022.

## 2022-05-16 DIAGNOSIS — G25.0 BENIGN FAMILIAL TREMOR: Chronic | ICD-10-CM

## 2022-05-16 DIAGNOSIS — I10 ESSENTIAL HYPERTENSION: ICD-10-CM

## 2022-05-16 RX ORDER — PROPRANOLOL HYDROCHLORIDE 120 MG/1
CAPSULE, EXTENDED RELEASE ORAL
Qty: 90 CAPSULE | Refills: 0 | Status: SHIPPED | OUTPATIENT
Start: 2022-05-16 | End: 2022-08-09 | Stop reason: SDUPTHER

## 2022-05-16 RX ORDER — LISINOPRIL 2.5 MG/1
TABLET ORAL
Qty: 90 TABLET | Refills: 0 | Status: SHIPPED | OUTPATIENT
Start: 2022-05-16 | End: 2022-08-09 | Stop reason: SDUPTHER

## 2022-05-16 NOTE — TELEPHONE ENCOUNTER
Frankie Swenson is requesting refill(s) propranolol  Last OV 9/24/21 (pertaining to medication)  LR 9/24/21 (per medication requested)  Next office visit scheduled or attempted No   If no, reason:

## 2022-08-09 ENCOUNTER — OFFICE VISIT (OUTPATIENT)
Dept: FAMILY MEDICINE CLINIC | Age: 82
End: 2022-08-09
Payer: MEDICARE

## 2022-08-09 VITALS
DIASTOLIC BLOOD PRESSURE: 76 MMHG | OXYGEN SATURATION: 97 % | SYSTOLIC BLOOD PRESSURE: 124 MMHG | HEIGHT: 65 IN | WEIGHT: 125.4 LBS | BODY MASS INDEX: 20.89 KG/M2 | HEART RATE: 78 BPM

## 2022-08-09 DIAGNOSIS — G25.0 BENIGN FAMILIAL TREMOR: Chronic | ICD-10-CM

## 2022-08-09 DIAGNOSIS — I10 ESSENTIAL HYPERTENSION: Primary | ICD-10-CM

## 2022-08-09 DIAGNOSIS — R73.03 PREDIABETES: ICD-10-CM

## 2022-08-09 PROBLEM — Z87.19 S/P RIGHT INGUINAL HERNIA REPAIR: Status: RESOLVED | Noted: 2019-04-10 | Resolved: 2022-08-09

## 2022-08-09 PROBLEM — K40.90 RIGHT GROIN HERNIA: Status: RESOLVED | Noted: 2019-03-05 | Resolved: 2022-08-09

## 2022-08-09 PROBLEM — Z98.890 S/P RIGHT INGUINAL HERNIA REPAIR: Status: RESOLVED | Noted: 2019-04-10 | Resolved: 2022-08-09

## 2022-08-09 PROCEDURE — 99214 OFFICE O/P EST MOD 30 MIN: CPT | Performed by: FAMILY MEDICINE

## 2022-08-09 PROCEDURE — G8420 CALC BMI NORM PARAMETERS: HCPCS | Performed by: FAMILY MEDICINE

## 2022-08-09 PROCEDURE — 1090F PRES/ABSN URINE INCON ASSESS: CPT | Performed by: FAMILY MEDICINE

## 2022-08-09 PROCEDURE — 36415 COLL VENOUS BLD VENIPUNCTURE: CPT | Performed by: FAMILY MEDICINE

## 2022-08-09 PROCEDURE — 1123F ACP DISCUSS/DSCN MKR DOCD: CPT | Performed by: FAMILY MEDICINE

## 2022-08-09 PROCEDURE — G8399 PT W/DXA RESULTS DOCUMENT: HCPCS | Performed by: FAMILY MEDICINE

## 2022-08-09 PROCEDURE — G8427 DOCREV CUR MEDS BY ELIG CLIN: HCPCS | Performed by: FAMILY MEDICINE

## 2022-08-09 PROCEDURE — 1036F TOBACCO NON-USER: CPT | Performed by: FAMILY MEDICINE

## 2022-08-09 RX ORDER — LISINOPRIL 2.5 MG/1
TABLET ORAL
Qty: 90 TABLET | Refills: 1 | Status: SHIPPED | OUTPATIENT
Start: 2022-08-09

## 2022-08-09 RX ORDER — PROPRANOLOL HYDROCHLORIDE 120 MG/1
CAPSULE, EXTENDED RELEASE ORAL
Qty: 90 CAPSULE | Refills: 1 | Status: SHIPPED | OUTPATIENT
Start: 2022-08-09

## 2022-08-09 ASSESSMENT — ENCOUNTER SYMPTOMS
VOMITING: 0
WHEEZING: 0
ABDOMINAL PAIN: 0
CHEST TIGHTNESS: 0
NAUSEA: 0
SHORTNESS OF BREATH: 0

## 2022-08-09 NOTE — PROGRESS NOTES
Subjective:      Patient ID: Andie King is a [de-identified] y.o. female. HPI  Chief Complaint   Patient presents with    Hypertension     Patient is here for a 6 month follow up     Here for checkup on HTN,essential tremors and prediabetes. Non smoker. States does not have any dizziness, lightheadedness or syncope from her blood pressure medicine. Does try to adhere to a healthy diet of low salt and low fat. Has long history of benign familial tremors. Had seen a neurologist in the past.  Has tried medications but had side effects stopped. Did not want surgery. States it is difficult for her to do things due to the tremoring in her hands. Her father had similar issue. Andie King is a [de-identified] y.o. female with the [facility] [place]:  Patient Active Problem List    Diagnosis Date Noted    Prediabetes 06/14/2018    Osteopenia 12/04/2015    Essential hypertension 09/16/2015    Benign familial tremor 06/24/2010    Family history of breast cancer 06/24/2010     Current Outpatient Medications   Medication Sig Dispense Refill    propranolol (INDERAL LA) 120 MG extended release capsule TAKE 1 CAPSULE BY MOUTH EVERY DAY 90 capsule 1    lisinopril (PRINIVIL;ZESTRIL) 2.5 MG tablet TAKE 1 TABLET BY MOUTH EVERY DAY 90 tablet 1    Cholecalciferol (VITAMIN D) 50 MCG (2000 UT) CAPS capsule Take by mouth      Omega-3 Fatty Acids (FISH OIL PO) Take 1 capsule by mouth daily      Multiple Vitamins-Minerals (MULTIVITAMIN ADULT PO) Take 1 tablet by mouth daily       No current facility-administered medications for this visit. Allergies: Patient has no known allergies.   Past Medical History:   Diagnosis Date    Arthritis     Basal cell carcinoma of skin of trunk 5/2011    back    Benign familial tremor     sxs began in HS    Decreased bone density     Hip fracture ([facility] [address] 2/12    sustained in fall    Hypertension     Right inguinal hernia      Past Surgical History:   Procedure Laterality Date    DENTAL SURGERY 2013    HERNIA REPAIR Right 4/10/2019    JULIO CINCI RIGHT INGUINAL HERNIA REPAIR performed by Catalina Levi MD at [facility]  12    right bipolar hip replacement    OTHER SURGICAL HISTORY  10/2011 approx    mole removal from back    SKIN CANCER EXCISION  2015    squamous cell skin cancer    TONSILLECTOMY  age 13    TUBAL LIGATION      d & c done simultaneously to remove IUD     Family History   Problem Relation Age of Onset    High Blood Pressure Mother     Arthritis Mother     Parkinsonism Mother         Parkinson's Disease    Heart Disease Father     High Blood Pressure Father     Cancer Sister         breast & bladder carcinoma    High Blood Pressure Sister     Other Sister         benign familial tremor    Cancer Sister         brain    Cancer Sister         breast    Other Brother         benign familial tremor    High Blood Pressure Son     Diabetes Paternal Grandfather      Social History     Tobacco Use    Smoking status: Former     Packs/day: 0.25     Years: 2.00     Pack years: 0.50     Types: Cigarettes     Quit date:      Years since quittin.6    Smokeless tobacco: Never    Tobacco comments:     smoked for 2 years when just out of high school   Substance Use Topics    Alcohol use: No       Vitals:    22 1528   BP: 124/76   Pulse: 78   SpO2: 97%   Weight: 125 lb 6.4 oz (56.9 kg)   Height: 5' 5\" (1.651 m)     Body mass index is 20.87 kg/m². Wt Readings from Last 3 Encounters:   22 125 lb 6.4 oz (56.9 kg)   21 122 lb 3.2 oz (55.4 kg)   10/30/20 122 lb (55.3 kg)     BP Readings from Last 3 Encounters:   22 124/76   21 122/78   10/30/20 138/82        Review of Systems   Constitutional:  Negative for chills and fever. Respiratory:  Negative for chest tightness, shortness of breath and wheezing. Cardiovascular:  Negative for chest pain and palpitations. Gastrointestinal:  Negative for abdominal pain, nausea and vomiting. Objective:   Physical Exam  Constitutional:       General: She is not in acute distress. Appearance: She is well-developed. HENT:      Head: Normocephalic. Cardiovascular:      Rate and Rhythm: Normal rate and regular rhythm. Heart sounds: Normal heart sounds. No murmur heard. Pulmonary:      Effort: Pulmonary effort is normal.      Breath sounds: Normal breath sounds. No wheezing. Abdominal:      General: Bowel sounds are normal. There is no distension. Palpations: Abdomen is soft. Tenderness: There is no abdominal tenderness. There is no guarding or rebound. Musculoskeletal:         General: No tenderness. Normal range of motion. Skin:     General: Skin is warm. Neurological:      General: No focal deficit present. Mental Status: She is alert and oriented to person, place, and time. Comments: tremors   Psychiatric:         Mood and Affect: Mood normal.         Behavior: Behavior normal.       Assessment:      Htn- controlled on lisinopril, check bmp. Continue medication. Refill sent  Benign essential tremor- on inderal, not great control but tolerates med. Continue, refill sent.    Prediabetes- check Ac, diet controlled      Plan:      Orders Placed This Encounter   Procedures    Hemoglobin V8J    Basic Metabolic Panel      Orders Placed This Encounter   Medications    propranolol (INDERAL LA) 120 MG extended release capsule     Sig: TAKE 1 CAPSULE BY MOUTH EVERY DAY     Dispense:  90 capsule     Refill:  1    lisinopril (PRINIVIL;ZESTRIL) 2.5 MG tablet     Sig: TAKE 1 TABLET BY MOUTH EVERY DAY     Dispense:  90 tablet     Refill:  1             ZANE Olvera [address] DO DANIELA

## 2022-08-10 LAB
ANION GAP SERPL CALCULATED.3IONS-SCNC: 16 MMOL/L (ref 3–16)
BUN BLDV-MCNC: 18 MG/DL (ref 7–20)
CALCIUM SERPL-MCNC: 9.6 MG/DL (ref 8.3–10.6)
CHLORIDE BLD-SCNC: 93 MMOL/L (ref 99–110)
CO2: 25 MMOL/L (ref 21–32)
CREAT SERPL-MCNC: 0.9 MG/DL (ref 0.6–1.2)
ESTIMATED AVERAGE GLUCOSE: 122.6 MG/DL
GFR AFRICAN AMERICAN: >60
GFR NON-AFRICAN AMERICAN: >60
GLUCOSE BLD-MCNC: 103 MG/DL (ref 70–99)
HBA1C MFR BLD: 5.9 %
POTASSIUM SERPL-SCNC: 4.8 MMOL/L (ref 3.5–5.1)
SODIUM BLD-SCNC: 134 MMOL/L (ref 136–145)

## 2023-02-05 ENCOUNTER — APPOINTMENT (OUTPATIENT)
Dept: GENERAL RADIOLOGY | Age: 83
End: 2023-02-05
Payer: MEDICARE

## 2023-02-05 ENCOUNTER — HOSPITAL ENCOUNTER (EMERGENCY)
Age: 83
Discharge: HOME | End: 2023-02-05
Payer: MEDICARE

## 2023-02-05 VITALS
SYSTOLIC BLOOD PRESSURE: 161 MMHG | BODY MASS INDEX: 20.09 KG/M2 | TEMPERATURE: 98.3 F | WEIGHT: 125 LBS | DIASTOLIC BLOOD PRESSURE: 86 MMHG | HEART RATE: 99 BPM | HEIGHT: 66 IN | OXYGEN SATURATION: 98 % | RESPIRATION RATE: 14 BRPM
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
## 2023-02-05 DIAGNOSIS — S52.602A CLOSED FRACTURE OF DISTAL ENDS OF LEFT RADIUS AND ULNA, INITIAL ENCOUNTER: ICD-10-CM

## 2023-02-05 DIAGNOSIS — S52.502A CLOSED FRACTURE OF DISTAL ENDS OF LEFT RADIUS AND ULNA, INITIAL ENCOUNTER: ICD-10-CM

## 2023-02-05 PROCEDURE — 73130 X-RAY EXAM OF HAND: CPT

## 2023-02-05 PROCEDURE — 6360000002 HC RX W HCPCS: Performed by: NURSE PRACTITIONER

## 2023-02-05 PROCEDURE — 96372 THER/PROPH/DIAG INJ SC/IM: CPT

## 2023-02-05 PROCEDURE — 99284 EMERGENCY DEPT VISIT MOD MDM: CPT

## 2023-02-05 PROCEDURE — 29125 APPL SHORT ARM SPLINT STATIC: CPT

## 2023-02-05 PROCEDURE — 6370000000 HC RX 637 (ALT 250 FOR IP): Performed by: NURSE PRACTITIONER

## 2023-02-05 RX ORDER — HYDROCODONE BITARTRATE AND ACETAMINOPHEN 5; 325 MG/1; MG/1
1 TABLET ORAL EVERY 6 HOURS PRN
Qty: 10 TABLET | Refills: 0 | Status: SHIPPED | OUTPATIENT
Start: 2023-02-05 | End: 2023-02-08

## 2023-02-05 RX ORDER — HYDROCODONE BITARTRATE AND ACETAMINOPHEN 5; 325 MG/1; MG/1
1 TABLET ORAL ONCE
Status: COMPLETED | OUTPATIENT
Start: 2023-02-05 | End: 2023-02-05

## 2023-02-05 RX ORDER — MORPHINE SULFATE 4 MG/ML
4 INJECTION, SOLUTION INTRAMUSCULAR; INTRAVENOUS ONCE
Status: COMPLETED | OUTPATIENT
Start: 2023-02-05 | End: 2023-02-05

## 2023-02-05 RX ADMIN — HYDROCODONE BITARTRATE AND ACETAMINOPHEN 1 TABLET: 5; 325 TABLET ORAL at 12:45

## 2023-02-05 RX ADMIN — MORPHINE SULFATE 4 MG: 4 INJECTION, SOLUTION INTRAMUSCULAR; INTRAVENOUS at 14:09

## 2023-02-05 ASSESSMENT — PAIN SCALES - GENERAL
PAINLEVEL_OUTOF10: 5
PAINLEVEL_OUTOF10: 8
PAINLEVEL_OUTOF10: 8
PAINLEVEL_OUTOF10: 7
PAINLEVEL_OUTOF10: 8

## 2023-02-05 ASSESSMENT — PAIN - FUNCTIONAL ASSESSMENT: PAIN_FUNCTIONAL_ASSESSMENT: 0-10

## 2023-02-05 NOTE — ED PROVIDER NOTES
[facility]  ED  Emergency Department Encounter    Patient Name: Ly Alejandra  MRN: [id number]  YOB: 1940  Date of Evaluation: 2/5/2023  Provider: No primary care provider on file. Note Started: 12:56 PM EST 2/5/23    CHIEF COMPLAINT  Fall (Pt arrived squad left wrist injury per squad pt walking her dog this AM got tangled up in chain causing pt to fall landed on wrist no LOC)    SHARED SERVICE VISIT  Patient seen and evaluated in conjunction with Carmina Torres PA-C      [address] [place] [address]  Ly Alejandra is a [de-identified] y.o. female who presents to the ED for fall while walking her dog. Patient reports she tripped and fell forward putting her left arm out to catch her self. She denies hitting her head or any loss of consciousness. Patient complaining to consistent pain to left wrist and hand. She is able to flex and extend her wrist with pain. Denies any numbness tingling or loss of sensation. She denies any other symptoms at this time    No other complaints, modifying factors or associated symptoms. Nursing notes reviewed were all reviewed and agreed with or any disagreements were addressed in the HPI.     PMH:  Past Medical History:   Diagnosis Date    Arthritis     Basal cell carcinoma of skin of trunk 5/2011    back    Benign familial tremor     sxs began in HS    Decreased bone density     Hip fracture ([facility] [address] 2/12    sustained in fall    Hypertension     Right inguinal hernia      Surgical History:  Past Surgical History:   Procedure Laterality Date    DENTAL SURGERY  1/2013    HERNIA REPAIR Right 4/10/2019    DAVINCI RIGHT INGUINAL HERNIA REPAIR performed by Twila Mancuso MD at [facility]  2/13/12    right bipolar hip replacement    OTHER SURGICAL HISTORY  10/2011 approx    mole removal from back    SKIN CANCER EXCISION  8/2015    squamous cell skin cancer    TONSILLECTOMY  age 13    TUBAL LIGATION      d & c done simultaneously to remove IUD Family History:  Family History   Problem Relation Age of Onset    High Blood Pressure Mother     Arthritis Mother     Parkinsonism Mother         Parkinson's Disease    Heart Disease Father     High Blood Pressure Father     Cancer Sister         breast & bladder carcinoma    High Blood Pressure Sister     Other Sister         benign familial tremor    Cancer Sister         brain    Cancer Sister         breast    Other Brother         benign familial tremor    High Blood Pressure Son     Diabetes Paternal Grandfather      Social History:  Social History     Socioeconomic History    Marital status:      Spouse name: Not on file    Number of children: Not on file    Years of education: Not on file    Highest education level: Not on file   Occupational History    Not on file   Tobacco Use    Smoking status: Former     Packs/day: 0.25     Years: 2.00     Pack years: 0.50     Types: Cigarettes     Quit date:      Years since quittin.1    Smokeless tobacco: Never    Tobacco comments:     smoked for 2 years when just out of high school   Vaping Use    Vaping Use: Never used   Substance and Sexual Activity    Alcohol use: No    Drug use: Never    Sexual activity: Not on file   Other Topics Concern    Not on file   Social History Narrative    Not on file     Social Determinants of Health     Financial Resource Strain: Not on file   Food Insecurity: Not on file   Transportation Needs: Not on file   Physical Activity: Not on file   Stress: Not on file   Social Connections: Not on file   Intimate Partner Violence: Not on file   Housing Stability: Not on file     Current Medications:  No current facility-administered medications for this encounter. Current Outpatient Medications   Medication Sig Dispense Refill    HYDROcodone-acetaminophen (NORCO) 5-325 MG per tablet Take 1 tablet by mouth every 6 hours as needed for Pain for up to 3 days. Intended supply: 3 days.  Take lowest dose possible to manage pain Max Daily Amount: 4 tablets 10 tablet 0    propranolol (INDERAL LA) 120 MG extended release capsule TAKE 1 CAPSULE BY MOUTH EVERY DAY 90 capsule 1    lisinopril (PRINIVIL;ZESTRIL) 2.5 MG tablet TAKE 1 TABLET BY MOUTH EVERY DAY 90 tablet 1    Cholecalciferol (VITAMIN D) 50 MCG (2000 UT) CAPS capsule Take by mouth      Omega-3 Fatty Acids (FISH OIL PO) Take 1 capsule by mouth daily      Multiple Vitamins-Minerals (MULTIVITAMIN ADULT PO) Take 1 tablet by mouth daily       Allergies:  No Known Allergies  Screenings:     [place] Com[place] Scale  Eye Opening: Spontaneous  Best Verbal Response: Oriented  Best Motor Response: Obeys commands  [place] Com[place] Scale Score: 15           CIWA Assessment  BP: (!) 161/86  Heart Rate: 99          REVIEW OF SYSTEMS  Positives and Pertinent Negatives as per HPI. PHYSICAL EXAM  BP (!) 161/86   Pulse 99   Temp 98.3 °F (36.8 °C) (Oral)   Resp 14   Ht 5' 6\" (1.676 m)   Wt 125 lb (56.7 kg)   LMP 03/01/1992   SpO2 98%   BMI 20.18 kg/m²     GENERAL APPEARANCE: Awake and alert. Cooperative. No acute distress. HEAD: Normocephalic. Atraumatic. EYES:  EOM's grossly intact. ENT: Mucous membranes are pink and moist.   NECK: Supple. HEART: Regular rate. No murmurs, rubs, or gallops. LUNGS: CTA B. Respirations unlabored. Good air exchange. ABDOMEN: Soft. Non-distended. Non-tender. EXTREMITIES: No peripheral edema. Moves all extremities equally. All extremities neurovascularly intact. Left wrist with edema. Ecchymosis noted to dorsal aspect of left hand with obvious displacement of third digit PIP joint. Cap refill less than 3 seconds. Neurovascularly intact. No loss of sensation. SKIN: Warm and dry. No acute rashes. NEUROLOGICAL: Alert and oriented. No gross facial drooping. Strength 5/5, sensation intact. EKG  When ordered, EKG's are interpreted by the ED Physician in the absence of a Cardiologist.  Please see their note for interpretation of EKG.     LABS  Labs Reviewed - No data to display  When ordered, only abnormal lab results are displayed. All other labs were within normal range or not returned as of this dictation. RADIOLOGY  Non-plain film images such as CT, U/S, and MRI are read by the radiologist.  Plain radiographic images are visualized and preliminarily interpreted by the ED Provider with the below findings:       Interpretation per the Radiologist below, if available at the time of this note:  XR HAND LEFT (MIN 3 VIEWS)   Final Result   3rd PIP joint reduced, no associated fracture         XR HAND LEFT (MIN 3 VIEWS)   Final Result   Impacted displaced fracture distal radius      Nondisplaced ulnar styloid fracture      Dislocated 3rd PIP joint           PROCEDURES  DIGITAL BLOCK  The risks and benefits of an digital block were explained to the patient. Jacinda Lion or their surrogate had an opportunity to ask questions, and the risks, benefits, and alternatives were discussed. Patient verbally consented to the procedure. I mixed 1 mL of 2% lidocaine without epi and 1 mL of 0.5% bupivacaine. Using pt's proximal phalanx of the left middle finger as landmark, I injected 1 mL of the mixture on each side of the digit (total 2 mL)  ESBL minimal. Complication none. PROCEDURE:  JOINT REDUCTION  The benefits, risks, and alternatives of left middle PIP reduction were discussed with Jacinda Lion or their surrogate. An opportunity to ask questions was provided, and consent was given for the procedure. Once adequately anesthetized, the left middle PIP was easily reduced using traction-countertraction. Following successful reduction, immobilization was performed and the extremity's neurovascular status was checked and it was intact. The patient tolerated the procedure without complications. ED COURSE/DDx/MDM  History obtained from:  Patient.     Vitals:  Vitals:    02/05/23 1221 02/05/23 1322   BP: (!) 153/118 (!) 161/86   Pulse: 99    Resp: 14    Temp: 98.3 °F (36.8 °C)    TempSrc: Oral    SpO2: 98%    Weight: 125 lb (56.7 kg)    Height: 5' 6\" (1.676 m)      Patient received following medications in ED:  Medications   HYDROcodone-acetaminophen (NORCO) 5-325 MG per tablet 1 tablet (1 tablet Oral Given 2/5/23 1245)   morphine sulfate (PF) injection 4 mg (4 mg IntraMUSCular Given 2/5/23 1409)     Sepsis Consideration:  Is this patient to be included in the SEP-1 Core Measure due to severe sepsis or septic shock? No   Exclusion criteria - the patient is NOT to be included for SEP-1 Core Measure due to: Infection is not suspected    Records Reviewed:   None. Chronic conditions affecting care:   Benign familial tremor, arthritis, decreased bone density. has a past medical history of Arthritis, Basal cell carcinoma of skin of trunk (5/2011), Benign familial tremor, Decreased bone density, Hip fracture ([facility] [address] (2/12), Hypertension, and Right inguinal hernia. Social Determinants:   None. Consults:   None necessary. Reassessment:      Upon reassessment patient without worsening condition or significant complaint. MDM/Disposition Considerations:   Briefly Ihsan Glez presents for wrist pain status post mechanical fall. Obvious deformity noted to left wrist and left middle finger at PIP joint. X-ray images obtained with findings of impacted displaced fracture distal radius, nondisplaced ulnar styloid fracture, dislocated third PIP joint. Patient was medicated with oral narcotics and subsequent IM morphine for continued pain. Verbal consent obtained from patient regarding simple joint reduction with local anesthetic to left middle finger PIP joint. Reduction performed and repeat x-ray showing successful procedure. Patient tolerated well. Finger splint was placed as well as sugar-tong splint to left arm. I personally reevaluated patient and she remained neurovascularly intact status post splint placement.   Patient stable and was able to be discharged home with orthopedic follow-up. Critical Care Time:   0 Minutes of critical care time spent not including separately billable procedures. DDx:  I estimate there is LOW risk for COMPARTMENT SYNDROME, DEEP VENOUS THROMBOSIS, SEPTIC ARTHRITIS, TENDON OR NEUROVASCULAR INJURY, thus I consider the discharge disposition reasonable. Ihsan Glez and I have also discussed returning to the Emergency Department immediately if new or worsening symptoms occur. We have discussed the symptoms which are most concerning (e.g., changing or worsening pain, numbness, weakness) that necessitate immediate return. FINAL IMPRESSION  1. Fall, initial encounter    2. Closed fracture of distal ends of left radius and ulna, initial encounter      Patient referred to:  Juan Coburn MD  [address] [address]  [phone]    Schedule an appointment as soon as possible for a visit in 1 day  For wound re-check    Discharge Medications:   Discharge Medication List as of 2/5/2023  3:29 PM        START taking these medications    Details   HYDROcodone-acetaminophen (NORCO) 5-325 MG per tablet Take 1 tablet by mouth every 6 hours as needed for Pain for up to 3 days. Intended supply: 3 days. Take lowest dose possible to manage pain Max Daily Amount: 4 tablets, Disp-10 tablet, R-0Print           Discontinued Medications:  Discharge Medication List as of 2/5/2023  3:29 PM        Risk management discussed and shared decision making had with patient and/or surrogate. All questions were answered. Patient will follow up with PCP and orthopedics in 1 day for further evaluation/treatment. All questions answered. Patient will return to ED for new/worsening symptoms. DISPOSITION:  Patient was discharged home in stable condition with family.     (Please note that portions of this note were completed with a voice recognition program.  Efforts were made to edit the dictations but occasionally words are mis-transcribed).           Shira Jones, VELVET - CNP  02/05/23 1557

## 2023-02-05 NOTE — ED NOTES
Pt scripts x1 instructed to follow up with [facility].  Assessed per Larisa Aguilera NP,     [facility], LPN  90/99/81 2017

## 2023-02-05 NOTE — ED NOTES
Pt left hand abrasion from fall cleansed with Hibiclens/Normal Saline/applied with PSO.      Abner Karimi LPN  28/22/92 7988

## 2023-02-06 ENCOUNTER — TELEPHONE (OUTPATIENT)
Dept: ORTHOPEDIC SURGERY | Age: 83
End: 2023-02-06

## 2023-02-06 DIAGNOSIS — G25.0 BENIGN FAMILIAL TREMOR: Chronic | ICD-10-CM

## 2023-02-06 DIAGNOSIS — I10 ESSENTIAL HYPERTENSION: ICD-10-CM

## 2023-02-06 RX ORDER — PROPRANOLOL HYDROCHLORIDE 120 MG/1
CAPSULE, EXTENDED RELEASE ORAL
Qty: 30 CAPSULE | Refills: 0 | Status: SHIPPED | OUTPATIENT
Start: 2023-02-06

## 2023-02-06 RX ORDER — LISINOPRIL 2.5 MG/1
TABLET ORAL
Qty: 30 TABLET | Refills: 0 | Status: SHIPPED | OUTPATIENT
Start: 2023-02-06

## 2023-02-06 NOTE — TELEPHONE ENCOUNTER
Appointment Request     Patient requesting earlier appointment: Yes  Appointment offered to patient: THIS IS A FRACTURE  Patient Contact Number: [phone]      NP/ L WRIST FX/ XR & ER VISIT 2/5/22 [facility]

## 2023-02-06 NOTE — TELEPHONE ENCOUNTER
----- Message from Eva Crandall sent at 2/6/2023  8:41 AM EST -----  Subject: Refill Request    QUESTIONS  Name of Medication? propranolol (INDERAL LA) 120 MG extended release   capsule  Patient-reported dosage and instructions? 1 tab per day in the morning  How many days do you have left? 3  Preferred Pharmacy? [id number]  Pharmacy phone number (if available)? [phone]  ---------------------------------------------------------------------------  --------------,  Name of Medication? lisinopril (PRINIVIL;ZESTRIL) 2.5 MG tablet  Patient-reported dosage and instructions? 1 tab per day  How many days do you have left? 9  Preferred Pharmacy? [facility] [id number]  Pharmacy phone number (if available)? [phone]  ---------------------------------------------------------------------------  --------------  Ed FONG  What is the best way for the office to contact you? OK to leave message on   voicemail  Preferred Call Back Phone Number? [phone]  ---------------------------------------------------------------------------  --------------  SCRIPT ANSWERS  Relationship to Patient?  Self

## 2023-02-06 NOTE — TELEPHONE ENCOUNTER
Spoke with patient and her daughter. Scheduled her to see Dr. Rebecca Garibay out at the [facility] office on 2/8/23. Daughter was given address.

## 2023-02-06 NOTE — TELEPHONE ENCOUNTER
Patient has upcoming appt on 2/10/23 with a new pcp, is asking for enough to hold over until she sees new pcp.

## 2023-02-08 ENCOUNTER — TELEPHONE (OUTPATIENT)
Dept: INTERNAL MEDICINE CLINIC | Age: 83
End: 2023-02-08

## 2023-02-08 ENCOUNTER — ANESTHESIA EVENT (OUTPATIENT)
Dept: OPERATING ROOM | Age: 83
End: 2023-02-08
Payer: MEDICARE

## 2023-02-08 ENCOUNTER — OFFICE VISIT (OUTPATIENT)
Dept: ORTHOPEDIC SURGERY | Age: 83
End: 2023-02-08
Payer: MEDICARE

## 2023-02-08 VITALS — RESPIRATION RATE: 15 BRPM | BODY MASS INDEX: 20.09 KG/M2 | HEIGHT: 66 IN | WEIGHT: 125 LBS

## 2023-02-08 DIAGNOSIS — S63.279A DISLOCATION, FINGER, INTERPHALANGEAL JOINT, INITIAL ENCOUNTER: ICD-10-CM

## 2023-02-08 DIAGNOSIS — S52.502A CLOSED FRACTURE DISTAL RADIUS AND ULNA, LEFT, INITIAL ENCOUNTER: Primary | ICD-10-CM

## 2023-02-08 DIAGNOSIS — S52.602A CLOSED FRACTURE DISTAL RADIUS AND ULNA, LEFT, INITIAL ENCOUNTER: Primary | ICD-10-CM

## 2023-02-08 PROCEDURE — 1036F TOBACCO NON-USER: CPT | Performed by: ORTHOPAEDIC SURGERY

## 2023-02-08 PROCEDURE — G8399 PT W/DXA RESULTS DOCUMENT: HCPCS | Performed by: ORTHOPAEDIC SURGERY

## 2023-02-08 PROCEDURE — G8484 FLU IMMUNIZE NO ADMIN: HCPCS | Performed by: ORTHOPAEDIC SURGERY

## 2023-02-08 PROCEDURE — G8420 CALC BMI NORM PARAMETERS: HCPCS | Performed by: ORTHOPAEDIC SURGERY

## 2023-02-08 PROCEDURE — 1123F ACP DISCUSS/DSCN MKR DOCD: CPT | Performed by: ORTHOPAEDIC SURGERY

## 2023-02-08 PROCEDURE — 99204 OFFICE O/P NEW MOD 45 MIN: CPT | Performed by: ORTHOPAEDIC SURGERY

## 2023-02-08 PROCEDURE — 1090F PRES/ABSN URINE INCON ASSESS: CPT | Performed by: ORTHOPAEDIC SURGERY

## 2023-02-08 PROCEDURE — G8427 DOCREV CUR MEDS BY ELIG CLIN: HCPCS | Performed by: ORTHOPAEDIC SURGERY

## 2023-02-08 NOTE — TELEPHONE ENCOUNTER
Message was sent to wrong office. Forwarding to [facility] , as patient will be establishing there this month.

## 2023-02-08 NOTE — PROGRESS NOTES
Covid testing to be done: If positive---Pt instructed to notify MD ASAP  If negative--pt was instructed to bring Hard copy of Covid results DOP/DOS    Preoperative Screening for Elective Surgery/Invasive Procedures While COVID-19 present in the community     Have you tested positive or have been told to self-isolate for COVID-19 like symptoms within the past 28 days? NO  Do you currently have any of the following symptoms? Fever >100.0 F or 99.9 F in immunocompromised patients? NO  New onset cough, shortness of breath or difficulty breathing? NO  New onset sore throat, myalgia (muscle aches and pains), headache, loss of taste/smell or diarrhea? NO  Have you had a potential exposure to COVID-19 within the past 14 days by:  Close contact with a confirmed case? NO  Close contact with a healthcare worker,  or essential infrastructure worker (grocery store, [employer], gas station, public utilities or transportation)? YES  Do you reside in a congregate setting such as; skilled nursing facility, adult home, correctional facility, homeless shelter or other institutional setting? NO  Have you had recent travel to a known COVID-19 hotspot? NO     Indicate if the patient has a positive screen by answering yes to one or more of the above questions. If patient is unable to obtain a COVID test prior to DOS/DOP will need RAPID DOS. C-Difficile admission screening and protocol:       * Admitted with diarrhea? [] YES    [x]  NO     *Prior history of C-Diff. In last 3 months? [] YES    [x]  NO     *Antibiotic use in the past 6-8 weeks? [x]  NO    []  YES      If yes, which: REASON_________________     *Prior hospitalization or nursing home in the last month? []  YES    [x]  NO     SAFETY FIRST. .call before you fall    [facility] time__1315__________        Surgery time__1530__________    Do not eat or drink anything after 12:00 midnight prior to your surgery. This includes water chewing gum, mints and ice chips- the Day of Surgery. You may brush your teeth and gargle the morning of your surgery, but do not swallow the water  Follow your MD/Surgeons pre-procedure instructions regarding your medications      Please see your family doctor/pediatrician for a history and physical and/or questions concerning medications. Bring any test results/reports from your physicians office. If you are under the care of a heart doctor or specialist doctor, please be aware that you may be asked to them for clearance    You may be asked to stop blood thinners such as Coumadin, Plavix, Fragmin, Lovenox, etc., or any anti-inflammatories such as:  Aspirin, Ibuprofen, Advil, Naproxen prior to your surgery. We also ask that you stop any OTC medications such as fish oil, vitamin E, glucosamine, garlic, Multivitamins, COQ 10, etc.    We ask that you do not smoke 24 hours prior to surgery  We ask that you do not  drink any alcoholic beverages 24 hours prior to surgery     You must make arrangements for a responsible adult to take you home after your surgery. For your safety you will not be allowed to leave alone or drive yourself home. Your surgery will be cancelled if you do not have a ride home. Also for your safety, it is strongly suggested that someone stay with you the first 24 hours after your surgery. A parent or legal guardian must accompany a child scheduled for surgery and plan to stay at the hospital until the child is discharged. Please do not bring other children with you. For your comfort, please wear simple loose fitting clothing to the hospital.  Please do not bring valuables. Do not wear any make-up or nail polish on your fingers or toes. For your safety, please do not wear any jewelry or body piercing's on the day of surgery. All jewelry must be removed.      If you have dentures, they will be removed before going to operating room.    For your convenience, we will provide you with a container. If you wear contact lenses or glasses, they will be removed, please bring a case for them. If you have a living will and a durable power of  for healthcare, please bring in a copy. As part of our patient safety program to minimize surgical site infections, we ask you to do the following:    Please notify your surgeon if you develop any illness between         now and the day of your surgery. This includes a cough, cold, fever, sore throat, nausea,         or vomiting, and diarrhea, etc.   Please notify your surgeon if you experience dizziness, shortness         of breath or blurred vision between now and the time of your surgery. Do not shave your operative site 96 hours prior to surgery. For face and neck surgery, men may use an electric razor 48 hours   prior to surgery. You may shower the night before surgery or the morning of   your surgery with an antibacterial soap. You will need to bring a photo ID and insurance card     If you use a C-pap or Bi-pap machine, please bring your machine with you to the hospital     Our goal is to provide you with excellent care, therefore, visitors will be limited to so that we may focus on providing this care for you. Please contact your surgeon office, if you have any further questions. [facility] phone number:  [address] PAT fax number:  [phone]    Please note these are generalized instructions for all surgical cases, you may be provided with more specific instructions according to your surgery.

## 2023-02-08 NOTE — TELEPHONE ENCOUNTER
----- Message from Nehal Rincon sent at 2/8/2023  3:27 PM EST -----  Subject: Medication Problem    Medication: Other - per instructios  Dosage: unknow  Ordering Provider: star    Question/Problem: Patient was prescribed Tylenol or codeine at the ER and   she threw it up.  Patients daughter stated she can't take it      Pharmacy: [facility], [address] [phone]    ---------------------------------------------------------------------------  --------------  Nicholas Juares INFO  [phone]; Do not leave any message, patient will call back for answer  ---------------------------------------------------------------------------  --------------    SCRIPT ANSWERS  Relationship to Patient: Other  Representative Name: Symone Ramirez  Is the Representative on the appropriate HIPAA document in Epic: Yes

## 2023-02-08 NOTE — LETTER
CONSENT TO OPERATION  AND/OR OTHER PROCEDURE(S)          PATIENT : Echo Childress OF BIRTH:  1940      DATE : 2/8/23          1. I request and consent that Dr. Yong Gonzales. Laurence Clark,  and/or his associates or assistants perform an operation and/or procedures on the above patient at  [place] [address], to treat the condition(s) which appear indicated by the diagnostic studies already performed. I have been told that in general terms the nature, purpose and reasonable expectations of the operation and/or procedure(s) are:     Open Reduction Internal Fixation  Left Distal Radius Fracture      2. It has been explained to me by the informing physician that during the course of the operation and/or procedure(s) unforeseen conditions may be revealed that necessitate an extension of the original operation and/or procedure(s) or different operation and/or procedures than those set forth in Paragraph 1. I therefore authorize and request that my physician and/or his associates or assistants perform such operations and/or procedures as are necessary and desirable in the exercise of professional judgment. The authority granted under this Paragraph 2 shall extend to all conditions that require treatment and are known to my physician at the time the operation is commenced. 3. I have been made aware by the informing physician of certain risks and consequences that are associated with the operation and/or procedure(s) described in Paragraph 1, the reasonable alternative methods or treatment, the possible consequences, the possibility that the operation and/or procedure(s) may be unsuccessful and the possibility of complications.   I understand the reasonably known risks to be:      - Bleeding  - Infection  - Poor Healing  - Possible Damage to Nerve, Vessel, Tendon/Muscle or Bone  - Need for further Treatment/Surgery  - Stiffness  - Pain  - Residual or Recurrent Symptoms  - Anesthetic and/or Medical Risks  - We have discussed the specific limitations and risks of hospital and/or office based treatment at this time due to the COVID-19 pandemic                I have been counseled about the risks of luis manuel Covid-19 in the katie-operative and post-operative periods related to this procedure. I have been made aware that luis manuel Covid-19 around the time of a surgical procedure may worsen my prognosis for recovering from the virus and lend to a higher morbidity and or mortality risk. With this knowledge, I have requested to proceed with the procedure as scheduled. 4. I have also been informed by the informing physician that there are other risks from both known and unknown causes that are attendant to the performance of any surgical procedure. I am aware that the practice of medicine and surgery is not an exact science, and that no guarantees have been made to me concerning the results of the operation and/or procedure(s). 5. I   CONSENT / REFUSE CONSENT  (strike the phrase that does not apply) to the taking of photographs before, during and/or after the operation or procedure for scientific/educational purposes. 6. I consent to the administration of anesthesia and to the use of such anesthetics as may be deemed advisable by the anesthesiologist who has been engaged by me or my physician.     7. I certify that I have read and understand the above consent to operation and/or other procedure(s); that the explanations therein referred to were made to me by the informing physician in advance of my signing this consent; that all blanks or statements requiring insertion or completion were filled in and inapplicable paragraphs, if any, were stricken before I signed; and that all questions asked by me about the operation and/or procedure(s) which I have consented to have been fully answered in a satisfactory manner.                                 _______________________           2/8/23 Witness     Signature Of Patient         Date        Mario Estevez. Moiz                                                 Informing Physician                                           Signature of Informing Physician                              If patient is unable to sign or is a minor, complete one of the following:    (A)  Patient is a minor   years of age. (B)  Patient is unable to sign because: The undersigned represents that he or she is duly authorized to execute this consent for and on behalf of the above named patient. Witness               o  Parent  o  Guardian   o  Spouse       o  Other (specify)                                           Patient Name: Haresh De Dios  Patient YOB: 1940  Dr. Patria Wen' Return To Work Policy  Regarding your ability to return to work after surgery or injury, Dr. Patria Wen will not state that any patient is off of work or cannot work at all. He will place you on restrictions after your surgical procedure or injury. Depending on the details of your particular situation, Dr. Patria Wen may state that you will have either light use or no use of your hand for a specific number of weeks. It is your obligation to communicate with your employer regarding your restrictions. It is your employer's decision as to whether they will accommodate your restrictions (i.e. allow you to come to work in your restricted capacity) or to not allow you to return to work under your restrictions. Dr. Patria Wen does not participate in making this decision and cannot influence your employer regarding their decision. If you do not communicate your restrictions to your employer, or if you do not present to work as you are scheduled to, Dr. Patria Wen will not provide an 'excuse' to explain your absence. A doctors note, or official forms (BWC, FMLA, etc.) will be filled out, upon request, to indicate your date of surgery and your restrictions as stated above.    [facility] Narcotic Policy  Patients will only be prescribed narcotics after surgical procedures or significant injury. Not all procedures cause pain great enough to require Narcotics and thus, not all patients will receive prescriptions after surgical procedures or injuries. Narcotics are never prescribed for chronic conditions. Narcotics are never prescribed for use longer than one week at a time. Refills are only granted in unusual circumstances and only at Dr. Ten Perkins discretion. Patients who are receiving narcotic medication from another physician or who are under pain management contracts will not be given a prescription for narcotics for any reason. Surgery Arrival Time:  You have been advised of the START TIME for your surgery as well as the ARRIVAL TIME at which you need to arrive at the surgery facility. Please understand that there is a certain amount of preparation which takes place at the surgery facility prior to the start of your surgery. If you arrive later than your scheduled ARRIVAL TIME, it may be necessary to cancel your surgery for that day and reschedule your procedure due to lack of adequate time for pre-surgery preparations. Thank you for being on time for your arrival.    I have read the above policies and understand that by agreeing to proceed with treatment by Dr. Belle Marc and his team, that I am agreeing to abide by these policies.   Patient Name:  Perry Telles    Patient Signature:  _____________________________    Jagruti Fountain Date:   2/8/23

## 2023-02-08 NOTE — TELEPHONE ENCOUNTER
----- Message from Minerva Love sent at 2/8/2023  3:24 PM EST -----  Subject: Refill Request    QUESTIONS  Name of Medication? propranolol (INDERAL LA) 120 MG extended release   capsule  Patient-reported dosage and instructions? 1 DAILY  How many days do you have left? 7  Preferred Pharmacy? [address]  Pharmacy phone number (if available)? [phone]  Additional Information for Provider? Daughter wants to know if she doesn't   get a refill would it hurt her to miss a few days of the medication  ---------------------------------------------------------------------------  --------------  [address]  What is the best way for the office to contact you? OK to leave message on   voicemail  Preferred Call Back Phone Number? [phone]  ---------------------------------------------------------------------------  --------------  SCRIPT ANSWERS  Relationship to Patient? Other  Representative Name? JONA Coto  Is the Representative on the appropriate HIPAA document in Epic?  Yes

## 2023-02-08 NOTE — PROGRESS NOTES
This [de-identified] y.o.  right hand dominant retired woman is seen in referral for the ED at [facility]  with a chief complaint of injury to their left wrist, hand, which was injured 3 days ago when she fell while walking her dog. .  She noticed pain and swelling of her left wrist and middle finger. She was evaluated at the [facility] were obtained and the patient's dislocated left middle finger PIP joint was reduced, her left wrist was splinted and she was referred for hand/upper extremity evaluation and treatment. There is no history of additional significant injury. Symptoms have not changed since the date of injury. She is taking acetaminophen for pain control. The pain assessment has been reviewed and is correct. The patient's social history, past medical history, family history, medications, allergies and review of systems, entered 2/8/23,  have been reviewed, and dated and are recorded in the chart. On physical examination the patient is Height: 5' 6\" (167.6 cm) tall and weighs Weight: 125 lb (56.7 kg). Respirations are 18 per minute. The patient is well nourished, is oriented to time and place, demonstrates appropriate mood and affect as well as normal gait and station. She is seen in the presence of her son. There is moderate soft tissue swelling present about the left wrist, hand. There is moderate discoloration. There is wrist deformity. Tenderness is present on palpation in the area of the left wrist.   Range of motion of the hand and wrist is limited only on the left and is accompanied by pain. Skin is intact, as is distal circulation and sensation. Gross muscle strength is limited only on the left   Hand and wrist joints are stable. There are no subcutaneous nodules or enlarged epitrochlear lymph nodes.     I have personally reviewed and interpreted all previous external imaging studies(hand/wrist Xrays, DEXA scan), laboratory tests(BMP, HbA1c), diagnostic procedures and medical encounters pertinent to this patient's visit today. Xrays: Review and independent interpretation of the recent external Xrays of the left hand and wrist demonstrate a displaced fracture of the distal radius and nondisplaced fracture of the tip of the ulnar styloid. A dislocation of the PIP joint of the middle finger was successfully reduced. Impression:  Displaced fracture left distal radius and ulnar styloid. Dislocation PIP joint left middle finger, reduced. The Xrays are shown to the patient and her family. The nature of their medical problem is fully discussed with the patient and family including all treatment options. Surgery is also discussed, including the possible risks, complications, prognosis and postoperative care. All questions are answered. The surgery consent forms are explained and signed. Surgery will be scheduled and the patient is asked to call me if there are any additional questions. The patient understands that the surgery will be done by Dr. Lauren Banks. They are carefully instructed regarding splint care, elevation, finger exercises, activity restrictions/precautions and pain control. All questions are answered. The sugar tong splint is modified to a short arm splint. The nature of this medical problem is fully discussed with the patient, including all treatment options. All questions are answered. I respectfully recommend that the patient's primary care physician evaluate this patient for osteoporosis and institute treatment if required.

## 2023-02-08 NOTE — TELEPHONE ENCOUNTER
Inderal and Lisinopril were both filled on 2/6 by Bhumi Pfeiffer NP at [facility]. Please call daughter and advise.  Thx

## 2023-02-09 ENCOUNTER — APPOINTMENT (OUTPATIENT)
Dept: GENERAL RADIOLOGY | Age: 83
End: 2023-02-09
Attending: ORTHOPAEDIC SURGERY
Payer: MEDICARE

## 2023-02-09 ENCOUNTER — HOSPITAL ENCOUNTER (OUTPATIENT)
Age: 83
Setting detail: OUTPATIENT SURGERY
Discharge: HOME | End: 2023-02-09
Attending: ORTHOPAEDIC SURGERY | Admitting: ORTHOPAEDIC SURGERY
Payer: MEDICARE

## 2023-02-09 ENCOUNTER — ANESTHESIA (OUTPATIENT)
Dept: OPERATING ROOM | Age: 83
End: 2023-02-09
Payer: MEDICARE

## 2023-02-09 VITALS
BODY MASS INDEX: 20.09 KG/M2 | HEIGHT: 66 IN | DIASTOLIC BLOOD PRESSURE: 98 MMHG | OXYGEN SATURATION: 98 % | SYSTOLIC BLOOD PRESSURE: 160 MMHG | HEART RATE: 88 BPM | WEIGHT: 125 LBS | TEMPERATURE: 98.4 F | RESPIRATION RATE: 15 BRPM

## 2023-02-09 DIAGNOSIS — S52.502A CLOSED FRACTURE DISTAL RADIUS AND ULNA, LEFT, INITIAL ENCOUNTER: Primary | ICD-10-CM

## 2023-02-09 DIAGNOSIS — S52.602A CLOSED FRACTURE DISTAL RADIUS AND ULNA, LEFT, INITIAL ENCOUNTER: Primary | ICD-10-CM

## 2023-02-09 PROCEDURE — 3600000015 HC SURGERY LEVEL 5 ADDTL 15MIN: Performed by: ORTHOPAEDIC SURGERY

## 2023-02-09 PROCEDURE — 3700000000 HC ANESTHESIA ATTENDED CARE: Performed by: ORTHOPAEDIC SURGERY

## 2023-02-09 PROCEDURE — 2580000003 HC RX 258: Performed by: ANESTHESIOLOGY

## 2023-02-09 PROCEDURE — 3600000005 HC SURGERY LEVEL 5 BASE: Performed by: ORTHOPAEDIC SURGERY

## 2023-02-09 PROCEDURE — 6360000002 HC RX W HCPCS: Performed by: ANESTHESIOLOGY

## 2023-02-09 PROCEDURE — 7100000000 HC PACU RECOVERY - FIRST 15 MIN: Performed by: ORTHOPAEDIC SURGERY

## 2023-02-09 PROCEDURE — A4216 STERILE WATER/SALINE, 10 ML: HCPCS | Performed by: ORTHOPAEDIC SURGERY

## 2023-02-09 PROCEDURE — A4217 STERILE WATER/SALINE, 500 ML: HCPCS | Performed by: ORTHOPAEDIC SURGERY

## 2023-02-09 PROCEDURE — 2500000003 HC RX 250 WO HCPCS

## 2023-02-09 PROCEDURE — 6360000002 HC RX W HCPCS: Performed by: ORTHOPAEDIC SURGERY

## 2023-02-09 PROCEDURE — 7100000011 HC PHASE II RECOVERY - ADDTL 15 MIN: Performed by: ORTHOPAEDIC SURGERY

## 2023-02-09 PROCEDURE — 2709999900 HC NON-CHARGEABLE SUPPLY: Performed by: ORTHOPAEDIC SURGERY

## 2023-02-09 PROCEDURE — C1713 ANCHOR/SCREW BN/BN,TIS/BN: HCPCS | Performed by: ORTHOPAEDIC SURGERY

## 2023-02-09 PROCEDURE — 3700000001 HC ADD 15 MINUTES (ANESTHESIA): Performed by: ORTHOPAEDIC SURGERY

## 2023-02-09 PROCEDURE — 6360000002 HC RX W HCPCS

## 2023-02-09 PROCEDURE — 2580000003 HC RX 258: Performed by: ORTHOPAEDIC SURGERY

## 2023-02-09 PROCEDURE — 7100000001 HC PACU RECOVERY - ADDTL 15 MIN: Performed by: ORTHOPAEDIC SURGERY

## 2023-02-09 PROCEDURE — 3209999900 FLUORO FOR SURGICAL PROCEDURES

## 2023-02-09 PROCEDURE — 2720000010 HC SURG SUPPLY STERILE: Performed by: ORTHOPAEDIC SURGERY

## 2023-02-09 PROCEDURE — 7100000010 HC PHASE II RECOVERY - FIRST 15 MIN: Performed by: ORTHOPAEDIC SURGERY

## 2023-02-09 PROCEDURE — 73100 X-RAY EXAM OF WRIST: CPT

## 2023-02-09 DEVICE — PEG BNE FIX L16MM DIA2.5MM DST VOLAR RAD PARTIALLY THRD FOR: Type: IMPLANTABLE DEVICE | Site: RADIUS | Status: FUNCTIONAL

## 2023-02-09 DEVICE — SCREW BNE L12MM DIA3.5MM CORT VOLAR TI NONCANNULATED LOK: Type: IMPLANTABLE DEVICE | Site: RADIUS | Status: FUNCTIONAL

## 2023-02-09 DEVICE — PEG BNE FIX L20MM DIA2.5MM DST VOLAR RAD PARTIALLY THRD FOR: Type: IMPLANTABLE DEVICE | Site: RADIUS | Status: FUNCTIONAL

## 2023-02-09 DEVICE — SCREW BNE L13MM DIA3.5MM CORT FOR DVRA PLATING SYS DVR: Type: IMPLANTABLE DEVICE | Site: RADIUS | Status: FUNCTIONAL

## 2023-02-09 DEVICE — PEG BNE FIX L22MM DIA2.5MM DST VOLAR RAD PARTIALLY THRD FOR: Type: IMPLANTABLE DEVICE | Site: RADIUS | Status: FUNCTIONAL

## 2023-02-09 DEVICE — SCREW BNE L14MM DIA3.5MM CORT VOLAR TI NONCANNULATED LOK: Type: IMPLANTABLE DEVICE | Site: RADIUS | Status: FUNCTIONAL

## 2023-02-09 DEVICE — PLATE BNE W24.4XL56.6MM STD L DST DORS VOLAR RAD T ANAT DBL: Type: IMPLANTABLE DEVICE | Site: RADIUS | Status: FUNCTIONAL

## 2023-02-09 RX ORDER — FENTANYL CITRATE 50 UG/ML
50 INJECTION, SOLUTION INTRAMUSCULAR; INTRAVENOUS EVERY 5 MIN PRN
Status: DISCONTINUED | OUTPATIENT
Start: 2023-02-09 | End: 2023-02-09 | Stop reason: HOSPADM

## 2023-02-09 RX ORDER — PROPOFOL 10 MG/ML
INJECTION, EMULSION INTRAVENOUS PRN
Status: DISCONTINUED | OUTPATIENT
Start: 2023-02-09 | End: 2023-02-09 | Stop reason: SDUPTHER

## 2023-02-09 RX ORDER — DIPHENHYDRAMINE HYDROCHLORIDE 50 MG/ML
12.5 INJECTION INTRAMUSCULAR; INTRAVENOUS
Status: DISCONTINUED | OUTPATIENT
Start: 2023-02-09 | End: 2023-02-09 | Stop reason: HOSPADM

## 2023-02-09 RX ORDER — SODIUM CHLORIDE 0.9 % (FLUSH) 0.9 %
5-40 SYRINGE (ML) INJECTION PRN
Status: DISCONTINUED | OUTPATIENT
Start: 2023-02-09 | End: 2023-02-09 | Stop reason: SDUPTHER

## 2023-02-09 RX ORDER — HYDRALAZINE HYDROCHLORIDE 20 MG/ML
5 INJECTION INTRAMUSCULAR; INTRAVENOUS
Status: DISCONTINUED | OUTPATIENT
Start: 2023-02-09 | End: 2023-02-09 | Stop reason: HOSPADM

## 2023-02-09 RX ORDER — SODIUM CHLORIDE 0.9 % (FLUSH) 0.9 %
5-40 SYRINGE (ML) INJECTION EVERY 12 HOURS SCHEDULED
Status: DISCONTINUED | OUTPATIENT
Start: 2023-02-09 | End: 2023-02-09 | Stop reason: HOSPADM

## 2023-02-09 RX ORDER — HYDROCODONE BITARTRATE AND ACETAMINOPHEN 5; 325 MG/1; MG/1
1 TABLET ORAL EVERY 6 HOURS PRN
Qty: 28 TABLET | Refills: 0 | Status: SHIPPED | OUTPATIENT
Start: 2023-02-09 | End: 2023-02-16

## 2023-02-09 RX ORDER — HYDROCODONE BITARTRATE AND ACETAMINOPHEN 5; 325 MG/1; MG/1
1 TABLET ORAL
Status: DISCONTINUED | OUTPATIENT
Start: 2023-02-09 | End: 2023-02-09 | Stop reason: HOSPADM

## 2023-02-09 RX ORDER — MEPERIDINE HYDROCHLORIDE 25 MG/ML
12.5 INJECTION INTRAMUSCULAR; INTRAVENOUS; SUBCUTANEOUS
Status: DISCONTINUED | OUTPATIENT
Start: 2023-02-09 | End: 2023-02-09 | Stop reason: HOSPADM

## 2023-02-09 RX ORDER — ONDANSETRON 2 MG/ML
INJECTION INTRAMUSCULAR; INTRAVENOUS PRN
Status: DISCONTINUED | OUTPATIENT
Start: 2023-02-09 | End: 2023-02-09 | Stop reason: SDUPTHER

## 2023-02-09 RX ORDER — MAGNESIUM HYDROXIDE 1200 MG/15ML
LIQUID ORAL CONTINUOUS PRN
Status: DISCONTINUED | OUTPATIENT
Start: 2023-02-09 | End: 2023-02-09 | Stop reason: HOSPADM

## 2023-02-09 RX ORDER — HYDRALAZINE HYDROCHLORIDE 20 MG/ML
INJECTION INTRAMUSCULAR; INTRAVENOUS
Status: COMPLETED
Start: 2023-02-09 | End: 2023-02-09

## 2023-02-09 RX ORDER — SODIUM CHLORIDE 9 MG/ML
INJECTION, SOLUTION INTRAVENOUS PRN
Status: DISCONTINUED | OUTPATIENT
Start: 2023-02-09 | End: 2023-02-09 | Stop reason: HOSPADM

## 2023-02-09 RX ORDER — LIDOCAINE HYDROCHLORIDE 20 MG/ML
INJECTION, SOLUTION EPIDURAL; INFILTRATION; INTRACAUDAL; PERINEURAL PRN
Status: DISCONTINUED | OUTPATIENT
Start: 2023-02-09 | End: 2023-02-09 | Stop reason: SDUPTHER

## 2023-02-09 RX ORDER — LORAZEPAM 2 MG/ML
0.5 INJECTION INTRAMUSCULAR
Status: DISCONTINUED | OUTPATIENT
Start: 2023-02-09 | End: 2023-02-09 | Stop reason: HOSPADM

## 2023-02-09 RX ORDER — SODIUM CHLORIDE 9 MG/ML
INJECTION, SOLUTION INTRAVENOUS PRN
Status: DISCONTINUED | OUTPATIENT
Start: 2023-02-09 | End: 2023-02-09 | Stop reason: SDUPTHER

## 2023-02-09 RX ORDER — FENTANYL CITRATE 50 UG/ML
INJECTION, SOLUTION INTRAMUSCULAR; INTRAVENOUS PRN
Status: DISCONTINUED | OUTPATIENT
Start: 2023-02-09 | End: 2023-02-09 | Stop reason: SDUPTHER

## 2023-02-09 RX ORDER — SODIUM CHLORIDE 0.9 % (FLUSH) 0.9 %
5-40 SYRINGE (ML) INJECTION PRN
Status: DISCONTINUED | OUTPATIENT
Start: 2023-02-09 | End: 2023-02-09 | Stop reason: HOSPADM

## 2023-02-09 RX ORDER — HALOPERIDOL 5 MG/ML
1 INJECTION INTRAMUSCULAR
Status: DISCONTINUED | OUTPATIENT
Start: 2023-02-09 | End: 2023-02-09 | Stop reason: HOSPADM

## 2023-02-09 RX ORDER — SODIUM CHLORIDE 0.9 % (FLUSH) 0.9 %
5-40 SYRINGE (ML) INJECTION EVERY 12 HOURS SCHEDULED
Status: DISCONTINUED | OUTPATIENT
Start: 2023-02-09 | End: 2023-02-09 | Stop reason: SDUPTHER

## 2023-02-09 RX ORDER — ONDANSETRON 2 MG/ML
4 INJECTION INTRAMUSCULAR; INTRAVENOUS
Status: DISCONTINUED | OUTPATIENT
Start: 2023-02-09 | End: 2023-02-09 | Stop reason: HOSPADM

## 2023-02-09 RX ADMIN — FENTANYL CITRATE 25 MCG: 50 INJECTION INTRAMUSCULAR; INTRAVENOUS at 17:06

## 2023-02-09 RX ADMIN — HYDRALAZINE HYDROCHLORIDE 5 MG: 20 INJECTION INTRAMUSCULAR; INTRAVENOUS at 18:17

## 2023-02-09 RX ADMIN — PROPOFOL 80 MG: 10 INJECTION, EMULSION INTRAVENOUS at 17:05

## 2023-02-09 RX ADMIN — ONDANSETRON 4 MG: 2 INJECTION INTRAMUSCULAR; INTRAVENOUS at 17:15

## 2023-02-09 RX ADMIN — LIDOCAINE HYDROCHLORIDE 50 MG: 20 INJECTION, SOLUTION EPIDURAL; INFILTRATION; INTRACAUDAL; PERINEURAL at 17:05

## 2023-02-09 RX ADMIN — HYDRALAZINE HYDROCHLORIDE 5 MG: 20 INJECTION INTRAMUSCULAR; INTRAVENOUS at 18:32

## 2023-02-09 RX ADMIN — SODIUM CHLORIDE: 9 INJECTION, SOLUTION INTRAVENOUS at 13:21

## 2023-02-09 RX ADMIN — FENTANYL CITRATE 25 MCG: 50 INJECTION INTRAMUSCULAR; INTRAVENOUS at 17:37

## 2023-02-09 RX ADMIN — CEFAZOLIN 2000 MG: 2 INJECTION, POWDER, FOR SOLUTION INTRAMUSCULAR; INTRAVENOUS at 17:02

## 2023-02-09 RX ADMIN — FENTANYL CITRATE 25 MCG: 50 INJECTION INTRAMUSCULAR; INTRAVENOUS at 17:02

## 2023-02-09 ASSESSMENT — LIFESTYLE VARIABLES: SMOKING_STATUS: 0

## 2023-02-09 ASSESSMENT — PAIN SCALES - GENERAL
PAINLEVEL_OUTOF10: 0
PAINLEVEL_OUTOF10: 0

## 2023-02-09 ASSESSMENT — PAIN - FUNCTIONAL ASSESSMENT: PAIN_FUNCTIONAL_ASSESSMENT: 0-10

## 2023-02-09 ASSESSMENT — ENCOUNTER SYMPTOMS: SHORTNESS OF BREATH: 0

## 2023-02-09 NOTE — H&P
Pre-operative Update of H&P:    I  have seen & examined Ms. Vinny Hinojosa related solely to her hand and upper extremity conditions, prior to the scheduled procedure on the date of her surgery. The indications for the planned surgical procedure & and her upper-extremity condition are unchanged.

## 2023-02-09 NOTE — OP NOTE
OPERATIVE REPORT          Patient:  Stephania Sims    YOB: 1940  Date of Service:  2/9/2023    Location:  [facility] - Main OR      Preoperative Diagnosis:  Complex intra-articular fracture of Left distal radius     Postoperative Diagnosis:  Same.    Procedure:  Open reduction & internal fixation of Left distal radius fracture - 3+ components    Surgeon:    Willie Rockwell MD    Surgical Assistant:    Hospital Supplied Assistant    Anesthesia:  General    Complications:  None    Blood Loss:   Minimal    Tourniquet Time:  27 minutes    Indications:  Ms. Stephania Sims is a 82 y.o. year-old female who sustained a significant fracture of the Left wrist. I have discussed with her, preoperatively, the complications, limitations, expectations, alternatives, and risks of surgical care which she has understood.  Ms. Stephania Sims has provided written informed consent to proceed.      Procedure:  After written consent was obtained and the proper operative site was identified and marked, Ms. Stephania Sims was brought to the operating room and placed in the supine position on the operating table.  The Left arm was then extended upon a hand table.  The patient was administered a dose of preoperative antibiotics and general anesthesia was induced.  The Left upper extremity was prepped and draped in the usual sterile fashion.      After Esmarch exsanguination, the pneumo-tourniquet was inflated to 250 mm of mercury.  A longitudinal incision was fashioned on the distal, volar, radial aspect of the forearm overlying the FCR tendon.  Dissection was carried carefully through the subcutaneous tissue, identifying and protecting the neurovascular structures.  The FCR sheath was incised and the tendon was transposed ulnarly.  The floor of the FCR sheath was incised, thus gaining access to Parona’s space.  The pronator quadratus showed no traumatic rupture, and the remaining muscle was carefully incised along its  distal and radial aspect, exposing the volar cortex of the distal radius. The fracture was identified and disimpacted. The fragments were mobilized and distraction was applied so as to allow debridement of the fracture site. All interposed soft tissue and organizing hematoma was removed. The fragments were manually reduced allowing restoration of articular congruity. A volar plate from [employer] was selected & applied to the volar aspect of the radius. It was afixed distally with 7 fixed angle locking pegs to secure the articular reduction. Fluoroscopic imaging was utilized to show that the hardware was in appropriate position and that the articular surface was concentrically reduced. With distal fixation complete, the plate was reduced to the volar cortex of the radius. This action accurately restored radial length, inclination, and tilt of the articular surface to near anatomic alignment. With this accomplished, the plate was secured to the radial diaphysis with four bicortical screws. Final fluoroscopic imaging demonstrated that the fracture was reduced well, the articular surface was congruent, and the hardware was of appropriate position and dimension. The wound was irrigated copiously with sterile saline for irrigation, & the pneumo-tourniquet was deflated after a period of 27 minutes of elevation. Hemostasis was easily obtained with direct pressure and electrocautery, and the fingers were immediately pink and well perfused. The wound was closed in layers, reapproximating soft-tissue over the hardware. The subcutaneous tissue and skin were closed with interrupted, absorbable sutures. Local anesthetic was instilled for postoperative analgesia. Adaptic dry sterile dressings and volar short arm splint was applied.       Ms. Sree Parra was awakened from anesthesia having tolerated the procedure without apparent complication and was returned to the recovery room in stable condition. At the conclusion of the procedure, all needles, instruments and sponge counts were correct. Radha Moore MD   2/9/2023 , 5:03 PM

## 2023-02-09 NOTE — DISCHARGE INSTRUCTIONS
Post-Operative Instructions    Wrist Fracture Repair:    Keep hand strictly elevated with fingers above eye-level to control swelling and pain. NOTE: If hand is allowed to swell, pain will be very difficult to control. Keep hand and bandage clean and dry. Do not change or unwrap bandage. Please leave bandage in place until your follow-up appointment. Maintain finger motion by fully straightening and fully bending fingers at least once an hour (while awake). This may cause some discomfort, but will not damage surgery. You should not use your operated hand for any tasks. NO LIFTING, CARRYING OR HEAVY USE. You may use an arm sling as you wish, but this is not adequate elevation to control swelling and pain. You may take the prescribed pain medication as needed    You may take over the counter medication (Tylenol, Advil, Aleve, etc.)     You should not take TYLENOL with any Prescribed Pain Medication as any prescribed pain medication already contains Tylenol. Please call the office at [phone]  in 24 - 48 hours to schedule a follow up appointment for 1 week after surgery. Please call the office at [phone]  if you have any questions or problems. Bhargav Saldivar MD

## 2023-02-09 NOTE — ANESTHESIA PRE PROCEDURE
Department of Anesthesiology  Preprocedure Note       Name:  Jamarcus Height   Age:  [de-identified] y.o.  :  1940                                          MRN:  [id number]         Date:  2023      Surgeon: Prince Guadarrama):  Molly Gonzalez MD    Procedure: Procedure(s):  OPEN REDUCTION AND INTERNAL FIXATION LEFT DISTAL RADIUS FRACTURE    Medications prior to admission:   Prior to Admission medications    Medication Sig Start Date End Date Taking? Authorizing Provider   propranolol (INDERAL LA) 120 MG extended release capsule TAKE 1 CAPSULE BY MOUTH EVERY DAY 23   CELESTINE Santana   lisinopril (PRINIVIL;ZESTRIL) 2.5 MG tablet TAKE 1 TABLET BY MOUTH EVERY DAY 23   CELESTINE Chaidez   Cholecalciferol (VITAMIN D) 50 MCG (2000) CAPS capsule Take by mouth    Historical Provider, MD   Omega-3 Fatty Acids (FISH OIL PO) Take 1 capsule by mouth daily    Historical Provider, MD   Multiple Vitamins-Minerals (MULTIVITAMIN ADULT PO) Take 1 tablet by mouth daily    Historical Provider, MD       Current medications:    Current Facility-Administered Medications   Medication Dose Route Frequency Provider Last Rate Last Admin    sodium chloride flush 0.9 % injection 5-40 mL  5-40 mL IntraVENous 2 times per day Marie Smith MD        sodium chloride flush 0.9 % injection 5-40 mL  5-40 mL IntraVENous PRN Marie Smith MD        0.9 % sodium chloride infusion   IntraVENous PRN Marie Smith  mL/hr at 23 1321 New Bag at 23 1321    ceFAZolin (ANCEF) 2,000 mg in sodium chloride 0.9 % 50 mL IVPB (mini-bag)  2,000 mg IntraVENous Once Molly Gonzalez MD           Allergies:     Allergies   Allergen Reactions    Codeine Nausea And Vomiting       Problem List:    Patient Active Problem List   Diagnosis Code    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    Family history of breast cancer Z80.3    Essential hypertension I10    Osteopenia M85.80    Prediabetes R73.03    Closed fracture distal radius and ulna, left, initial encounter S52.502A, S52.602A    Dislocation, finger, interphalangeal joint, initial encounter S63.279A       Past Medical History:        Diagnosis Date    Arthritis     Basal cell carcinoma of skin of trunk 2011    back    Benign familial tremor     sxs began in HS    Decreased bone density     Hip fracture ([address]     sustained in fall    Hypertension     Right inguinal hernia        Past Surgical History:        Procedure Laterality Date    DENTAL SURGERY  2013    HERNIA REPAIR Right 4/10/2019    DAVINCI RIGHT INGUINAL HERNIA REPAIR performed by Tadeo Larson MD at [address] [place] [address]  12    right bipolar hip replacement    OTHER SURGICAL HISTORY  10/2011 approx    mole removal from back    SKIN CANCER EXCISION  2015    squamous cell skin cancer    TONSILLECTOMY  age 13   [de-identified] TUBAL LIGATION      d & c done simultaneously to remove IUD       Social History:    Social History     Tobacco Use    Smoking status: Former     Packs/day: 0.25     Years: 2.00     Pack years: 0.50     Types: Cigarettes     Quit date:      Years since quittin.1    Smokeless tobacco: Never    Tobacco comments:     smoked for 2 years when just out of high school   Substance Use Topics    Alcohol use:  No                                Counseling given: Not Answered  Tobacco comments: smoked for 2 years when just out of high school      Vital Signs (Current):   Vitals:    23 1625 23 1313   BP:  125/75   Pulse:  82   Resp:  18   Temp:  99.8 °F (37.7 °C)   TempSrc:  Temporal   SpO2:  96%   Weight: 125 lb (56.7 kg)    Height: 5' 6\" (1.676 m)                                               BP Readings from Last 3 Encounters:   23 125/75   23 (!) 161/86   22 124/76       NPO Status: Time of last liquid consumption:                         Time of last solid consumption: 2300                        Date of last liquid consumption: 23                        Date of last solid food consumption: 02/08/23    BMI:   Wt Readings from Last 3 Encounters:   02/08/23 125 lb (56.7 kg)   02/08/23 125 lb (56.7 kg)   02/05/23 125 lb (56.7 kg)     Body mass index is 20.18 kg/m². CBC:   Lab Results   Component Value Date/Time    WBC 6.9 06/14/2018 10:30 AM    RBC 4.25 06/14/2018 10:30 AM    HGB 13.9 06/14/2018 10:30 AM    HCT 40.6 06/14/2018 10:30 AM    MCV 95.5 06/14/2018 10:30 AM    RDW 13.6 06/14/2018 10:30 AM     06/14/2018 10:30 AM       CMP:   Lab Results   Component Value Date/Time     08/09/2022 03:44 PM    K 4.8 08/09/2022 03:44 PM    CL 93 08/09/2022 03:44 PM    CO2 25 08/09/2022 03:44 PM    BUN 18 08/09/2022 03:44 PM    CREATININE 0.9 08/09/2022 03:44 PM    GFRAA >60 08/09/2022 03:44 PM    GFRAA >60 01/25/2013 10:37 AM    AGRATIO 1.9 12/12/2017 11:19 AM    LABGLOM >60 08/09/2022 03:44 PM    GLUCOSE 103 08/09/2022 03:44 PM    PROT 7.2 12/12/2017 11:19 AM    PROT 7.1 01/25/2013 10:37 AM    CALCIUM 9.6 08/09/2022 03:44 PM    BILITOT 0.6 12/12/2017 11:19 AM    ALKPHOS 91 12/12/2017 11:19 AM    AST 17 12/12/2017 11:19 AM    ALT 17 12/12/2017 11:19 AM       POC Tests: No results for input(s): POCGLU, POCNA, POCK, POCCL, POCBUN, POCHEMO, POCHCT in the last 72 hours.     Coags:   Lab Results   Component Value Date/Time    PROTIME 11.5 02/12/2012 08:55 AM    INR 1.05 02/12/2012 08:55 AM       HCG (If Applicable): No results found for: PREGTESTUR, PREGSERUM, HCG, HCGQUANT     ABGs: No results found for: PHART, PO2ART, ILI7TDI, ZMD7KYK, BEART, B3WNMQKI     Type & Screen (If Applicable):  Lab Results   Component Value Date    LABABO O 02/12/2012    [address] Positive 02/12/2012       Drug/Infectious Status (If Applicable):  No results found for: HIV, HEPCAB    COVID-19 Screening (If Applicable): No results found for: COVID19        Anesthesia Evaluation  Patient summary reviewed no history of anesthetic complications:   Airway: Mallampati: II  TM distance: >3 FB   Neck ROM: full  Mouth opening: > = 3 FB   Dental: normal exam         Pulmonary:Negative Pulmonary ROS       (-) shortness of breath and not a current smoker          Patient did not smoke on day of surgery. Cardiovascular:    (+) hypertension:,     (-) pacemaker, past MI, CABG/stent and  angina       Beta Blocker:  Dose within 24 Hrs         Neuro/Psych:   (+) neuromuscular disease (Benign essential tremor):,    (-) seizures and CVA           GI/Hepatic/Renal: Neg GI/Hepatic/Renal ROS       (-) liver disease and no renal disease       Endo/Other: Negative Endo/Other ROS       (-) diabetes mellitus, hypothyroidism, hyperthyroidism               Abdominal:             Vascular: negative vascular ROS. Other Findings:           Anesthesia Plan      general     ASA 3       Induction: intravenous. MIPS: Postoperative opioids intended and Prophylactic antiemetics administered. Anesthetic plan and risks discussed with patient. Plan discussed with CRNA. This pre-anesthesia assessment may be used as a history and physical.    DOS STAFF ADDENDUM:    Pt seen and examined, chart reviewed (including anesthesia, drug and allergy history). No interval changes to history and physical examination. Anesthetic plan, risks, benefits, alternatives, and personnel involved discussed with patient. Patient verbalized an understanding and agrees to proceed.       Asad Carias MD  February 9, 2023  4:37 PM

## 2023-02-09 NOTE — PROGRESS NOTES
Pt arrived to PACU from OR. Pt asleep on room air with oral airway in place. Left arm with splint and ace wrap C/D/I. Fingers warm. Cap refill WNL.

## 2023-02-10 NOTE — PROGRESS NOTES
Pt discharged to home with Son-in-law to transport. Discharge instructions given to Pt and daughter, all questions answered. Vital signs stable on room air. Pt encouraged to take her blood pressure medications when she gets home.

## 2023-02-10 NOTE — ANESTHESIA POSTPROCEDURE EVALUATION
Department of Anesthesiology  Postprocedure Note    Patient: Vinny Hinojosa  MRN: [id number]  YOB: 1940  Date of evaluation: 2/9/2023      Procedure Summary     Date: 02/09/23 Room / Location: [address]    Anesthesia Start: 1702 Anesthesia Stop: 1803    Procedure: OPEN REDUCTION AND INTERNAL FIXATION LEFT DISTAL RADIUS FRACTURE (Left: Arm Lower) Diagnosis:       Closed fracture of distal end of left radius, unspecified fracture morphology, initial encounter      (LEFT DISTAL RADIUS FRACTURE)    Surgeons: Lori Conde MD Responsible Provider: Alexus Mars MD    Anesthesia Type: general ASA Status: 3          Anesthesia Type: No value filed.     Nura Phase I: Nura Score: 10    Nura Phase II:        Anesthesia Post Evaluation    Patient location during evaluation: bedside  Patient participation: complete - patient participated  Level of consciousness: awake and alert  Pain score: 0  Nausea & Vomiting: no nausea  Complications: no  Cardiovascular status: hemodynamically stable  Respiratory status: acceptable  Hydration status: stable

## 2023-02-15 ENCOUNTER — OFFICE VISIT (OUTPATIENT)
Dept: ORTHOPEDIC SURGERY | Age: 83
End: 2023-02-15

## 2023-02-15 VITALS — WEIGHT: 125 LBS | BODY MASS INDEX: 20.09 KG/M2 | HEIGHT: 66 IN

## 2023-02-15 DIAGNOSIS — S52.602A CLOSED FRACTURE DISTAL RADIUS AND ULNA, LEFT, INITIAL ENCOUNTER: Primary | ICD-10-CM

## 2023-02-15 DIAGNOSIS — S52.502A CLOSED FRACTURE DISTAL RADIUS AND ULNA, LEFT, INITIAL ENCOUNTER: Primary | ICD-10-CM

## 2023-02-15 NOTE — PATIENT INSTRUCTIONS
Postoperative Instructions After Wrist Fracture Repair    Dr. Olive Mc    Please wear removable brace full-time for three weeks except when doing exercises and bathing. (As instructed below). You should sleep in the brace. You may begin washing and bathing normally 12 days from the surgery, including washing the incision area with mild soap and water. Perform the following exercises at least four times a day. Exercises should be performed in the seated position with elbow on tabletop or other firm surface. If you cannot make these motions on your own, you may use other hand to assist in making these motions. Fully straighten fingers until hand is flat. Fully bend fingers until hand is in a full fist.   Bend wrist forward and backward. Rotate forearm so that your palm faces towards your face. Rotate forearm so that your palm faces away from your face. Fully straighten elbow. Fully bend elbow. Continue light use of the hand lifting nothing heavier than a coffee cup with the operated on hand. It is okay to use the hand gently for eating, writing, typing, etc.  The stitches are dissolvable; they may take up to three weeks to fully disappear. Beginning 12 days after surgery, you should begin gently massaging the incision with Vitamin E (may use Vitamin E lotion or contents of Vitamin E capsule). If you are having significant problems in obtaining range of motion, please contact me so that we may begin physical therapy. Thank you for choosing [facility]) Physicians for your Hand and Upper Extremity needs. If we can be of any further assistance to you, please do not hesitate to contact us.     Office Phone Number:  [phone]  or  [phone]

## 2023-02-15 NOTE — PROGRESS NOTES
Ms. Natalie Mohan returns today in follow-up of her recent left Distal Radius Fracture Repair done approximately 1 week ago. She has done well noting mild discomfort and no other reported complications. She notes no symptoms of numbness, tingling, no symptoms related to perfusion. Physical Exam:  Skin incision is healing well, without erythema, drainage or sign of infection. Digital range of motion is without significant limitation and limited by Osteoarthritis. FPL function is Intact, EPL function is Intact  Wrist range of motion is limited by painand not stressed today. Sensation is normal in the Whole Hand. Vascular examination reveals normal, good capillary refill, and good color. Swelling is mild. There is no clinical evidence of residual skeletal deformity. Radiographic Evaluation:  Radiographs were obtained today (3 views of the left wrist). They demonstrate good restoration of alignment of the fracture fragments without sign of hardware loosening or failure of fixation. Impression:  Ms. Natalie Mohan is doing well after recent left Distal Radius Fracture Repair. Plan:  Ms. Natalie Mohan is today fitted with a removable forearm based protective orthosis and given instructions regarding the appropriate wear and use of the device. She is also instructed in work on Active & Passive range of motion of the digits, wrist, & elbow. These modalities were specifically demonstrated to her today. We discussed the necessary restrictions on the use of the injured hand & wrist and the limitations on resumption of activities. She is given instructions regarding management of the fresh surgical incision and progressive use of desensitization and tissue massage techniques. We discussed the appropriate expectations and timeline for symptom improvement. We discussed the option of pursuing formalized hand therapy and a prescription  was not indicated.     She is provided a written patient instruction sheet titled: Postoperative Instructions After Wrist Fracture Repair -- #1. As this patient has demonstrated risk-factors for Osteoporosis, and recent history of a fracture, I have referred her back to her Primary Care Physician for evaluation of Osteoporosis including possible consideration for DEXA Scan, if this is felt to be clinically indicated. Ms. David Jones is instructed to contact her Primary Care Physician to discuss and arrange such evaluation. I have asked Ms. David Jones to follow-up with me by scheduling an appointment for 3 weeks from now at which time we will obtain repeat radiographs of the wrist out of the splint. She is also specifically instructed to return to the office or call for an appointment sooner if her symptoms are changing or worsening prior to that time.

## 2023-02-17 ENCOUNTER — OFFICE VISIT (OUTPATIENT)
Dept: INTERNAL MEDICINE CLINIC | Age: 83
End: 2023-02-17

## 2023-02-17 VITALS
DIASTOLIC BLOOD PRESSURE: 78 MMHG | TEMPERATURE: 97.2 F | BODY MASS INDEX: 20.18 KG/M2 | OXYGEN SATURATION: 98 % | HEART RATE: 72 BPM | SYSTOLIC BLOOD PRESSURE: 136 MMHG | WEIGHT: 125 LBS

## 2023-02-17 DIAGNOSIS — Z76.89 ENCOUNTER TO ESTABLISH CARE: Primary | ICD-10-CM

## 2023-02-17 DIAGNOSIS — S52.502D CLOSED FRACTURE OF DISTAL ENDS OF LEFT RADIUS AND ULNA WITH ROUTINE HEALING, SUBSEQUENT ENCOUNTER: ICD-10-CM

## 2023-02-17 DIAGNOSIS — M85.89 OSTEOPENIA OF MULTIPLE SITES: Chronic | ICD-10-CM

## 2023-02-17 DIAGNOSIS — I10 ESSENTIAL HYPERTENSION: Chronic | ICD-10-CM

## 2023-02-17 DIAGNOSIS — R60.0 LOWER EXTREMITY EDEMA: ICD-10-CM

## 2023-02-17 DIAGNOSIS — S52.602D CLOSED FRACTURE OF DISTAL ENDS OF LEFT RADIUS AND ULNA WITH ROUTINE HEALING, SUBSEQUENT ENCOUNTER: ICD-10-CM

## 2023-02-17 DIAGNOSIS — G25.0 BENIGN FAMILIAL TREMOR: Chronic | ICD-10-CM

## 2023-02-17 DIAGNOSIS — Z00.00 PREVENTATIVE HEALTH CARE: ICD-10-CM

## 2023-02-17 DIAGNOSIS — R73.03 PREDIABETES: ICD-10-CM

## 2023-02-17 DIAGNOSIS — Z91.81 AT HIGH RISK FOR FALLS: ICD-10-CM

## 2023-02-17 SDOH — ECONOMIC STABILITY: HOUSING INSECURITY
IN THE LAST 12 MONTHS, WAS THERE A TIME WHEN YOU DID NOT HAVE A STEADY PLACE TO SLEEP OR SLEPT IN A SHELTER (INCLUDING NOW)?: NO

## 2023-02-17 SDOH — ECONOMIC STABILITY: FOOD INSECURITY: WITHIN THE PAST 12 MONTHS, YOU WORRIED THAT YOUR FOOD WOULD RUN OUT BEFORE YOU GOT MONEY TO BUY MORE.: NEVER TRUE

## 2023-02-17 SDOH — ECONOMIC STABILITY: INCOME INSECURITY: HOW HARD IS IT FOR YOU TO PAY FOR THE VERY BASICS LIKE FOOD, HOUSING, MEDICAL CARE, AND HEATING?: NOT HARD AT ALL

## 2023-02-17 SDOH — ECONOMIC STABILITY: FOOD INSECURITY: WITHIN THE PAST 12 MONTHS, THE FOOD YOU BOUGHT JUST DIDN'T LAST AND YOU DIDN'T HAVE MONEY TO GET MORE.: NEVER TRUE

## 2023-02-17 ASSESSMENT — PATIENT HEALTH QUESTIONNAIRE - PHQ9
SUM OF ALL RESPONSES TO PHQ QUESTIONS 1-9: 0
SUM OF ALL RESPONSES TO PHQ QUESTIONS 1-9: 0
1. LITTLE INTEREST OR PLEASURE IN DOING THINGS: 0
SUM OF ALL RESPONSES TO PHQ QUESTIONS 1-9: 0
SUM OF ALL RESPONSES TO PHQ9 QUESTIONS 1 & 2: 0
2. FEELING DOWN, DEPRESSED OR HOPELESS: 0
SUM OF ALL RESPONSES TO PHQ QUESTIONS 1-9: 0

## 2023-02-17 NOTE — PROGRESS NOTES
[facility] Internal Medicine  Establish care visit   2023    Ross Betancourt (:  1940) is a [de-identified] y.o. female, here to establish care. Chief Complaint   Patient presents with    Established New Doctor        ASSESSMENT/ PLAN  1. Encounter to establish care  -Reviewed previous notes. 2. At high risk for falls  Patient is at high risk of falls. She has had 2 previous episode of falls and noted this was secondary to pets. Does have occasional episodes of lightheadedness. Vitals today stable. Advised home blood pressure monitoring. Uses a cane. Refer to home PT. 3. Essential hypertension  -Blood pressure at goal today. Continue on lisinopril. She is currently on a 2.5 mg dose. Counseled on DASH diet. 4. Benign familial tremor  -Has tried multiple medications in the past with major side effects. Was seen by neurology. Is currently on Inderal with some control of symptoms. Continue on Inderal.  Still has tremors. Discussed weighted utensils. Obtain TSH T4.  - TSH with Reflex to FT4; Future  - External Referral To Home Health    5. Prediabetes  History of prediabetes. Repeat A1c, CMP  - Comprehensive Metabolic Panel; Future  - Hemoglobin A1C; Future    6. Osteopenia of multiple sites  -History of osteopenia with recent fracture. Obtain DEXA scan. Continue on calcium and vitamin D supplementation.  - DEXA BONE DENSITY AXIAL SKELETON; Future    7. Lower extremity edema  -Denies any chest pain, shortness of breath. Bilateral equal lower extremity edema. Advised compression stockings. Obtain echo  - Echocardiogram complete; Future    8. Preventative health care  - Lipid Panel; Future  - CBC with Auto Differential; Future    9. Closed fracture of distal ends of left radius and ulna with routine healing, subsequent encounter  -Following up with Ortho. Using brace. Denies any pain. Able to move all fingers distally.   Distal neurovascular status intact    Return in about 6 months (around 8/17/2023) for HTN. Collette Bell HPI  Patient is a 68-year-old female with past medical history significant for hypertension, benign familial tremor, osteopenia presenting to establish care with me. Her daughter is present during this visit. Her daughter noted that about a week ago she fell down after being pulled by her dog. This is her second episode of fall within the last 1 year. Is following up with orthopedics both small events of acute because of her dog. Denies any loss of consciousness but endorses occasional lightheadedness. .  Uses a cane to walk around. Denies any problems with mood and attention. She noted to have seen neurology in the past for benign essential tremor and was put on several medications which cause more side effects and has been on Inderal which she has tolerated well. Over the last couple of months has noticed some lower extremity edema bilaterally but denies any pain while walking, fevers, shortness of breath, chest pain. ROS:      CONSTITUTIONAL:  No fevers, chills, sweats or weight changes  EYES:  No redness or visual symptoms. EARS, NOSE AND THROAT:  No difficulties with hearing. No symptoms of rhinitis or sore throat. CARDIOVASCULAR:  No chest pains, palpitations, orthopnea or paroxysmal noctunal dyspnea. RESPIRATORY:  No dyspnea on exertion, wheezing or cough. GI:  No nausea, vomiting, diarrhea, constipation, abdominal pain, hematochezia or melena. :  No dysuria, urinary hesitancy or dribbling. No nocturia or urinary frequency. No abnormal urethral  discharge. MUSCULOSKELETAL:  No muscle, joint or back aches  NEUROLOGIC:  No chronic headaches, no seizures. No numbness, tingling or weakness. Endorses tremors  PSYCHIATRIC:  No anxiety, mood or sleep disturbance. ENDOCRINE:  No excessive urination or excessive thirst.  DERMATOLOGIC:  No rashes or skin changes.     HISTORIES  Current Outpatient Medications on File Prior to Visit   Medication Sig Dispense Refill propranolol (INDERAL LA) 120 MG extended release capsule TAKE 1 CAPSULE BY MOUTH EVERY DAY 30 capsule 0    lisinopril (PRINIVIL;ZESTRIL) 2.5 MG tablet TAKE 1 TABLET BY MOUTH EVERY DAY 30 tablet 0    Cholecalciferol (VITAMIN D) 50 MCG (2000 UT) CAPS capsule Take by mouth      Omega-3 Fatty Acids (FISH OIL PO) Take 1 capsule by mouth daily      Multiple Vitamins-Minerals (MULTIVITAMIN ADULT PO) Take 1 tablet by mouth daily       No current facility-administered medications on file prior to visit.         Allergies   Allergen Reactions    Codeine Nausea And Vomiting       Past Medical History:   Diagnosis Date    Arthritis     Basal cell carcinoma of skin of trunk 5/2011    back    Benign familial tremor     sxs began in HS    Decreased bone density     Hip fracture ([facility] [address] 2/12    sustained in fall    Hypertension     Right inguinal hernia        Patient Active Problem List   Diagnosis    Benign familial tremor    Family history of breast cancer    Essential hypertension    Osteopenia    Prediabetes    Closed fracture distal radius and ulna, left, initial encounter    Dislocation, finger, interphalangeal joint, initial encounter        Past Surgical History:   Procedure Laterality Date    DENTAL SURGERY  1/2013    FOREARM SURGERY Left 2/9/2023    OPEN REDUCTION AND INTERNAL FIXATION LEFT DISTAL RADIUS FRACTURE performed by Tee Calix MD at [address] [place] [address] Right 4/10/2019    [address] performed by Ramana Henry MD at [facility]  2/13/12    right bipolar hip replacement    OTHER SURGICAL HISTORY  10/2011 approx    mole removal from back    SKIN CANCER EXCISION  8/2015    squamous cell skin cancer    TONSILLECTOMY  age 13    TUBAL LIGATION      d & c done simultaneously to remove IUD       Social History     Socioeconomic History    Marital status:      Spouse name: Not on file    Number of children: Not on file    Years of education: Not on file Highest education level: Not on file   Occupational History    Not on file   Tobacco Use    Smoking status: Former     Packs/day: 0.25     Years: 2.00     Pack years: 0.50     Types: Cigarettes     Quit date: 48     Years since quittin.1    Smokeless tobacco: Never    Tobacco comments:     smoked for 2 years when just out of high school   Vaping Use    Vaping Use: Never used   Substance and Sexual Activity    Alcohol use: No    Drug use: Never    Sexual activity: Not Currently     Partners: Male     Comment:  passed away 10/2022   Other Topics Concern    Not on file   Social History Narrative    Not on file     Social Determinants of Health     Financial Resource Strain: Low Risk     Difficulty of Paying Living Expenses: Not hard at all   Food Insecurity: No Food Insecurity    Worried About Running Out of Food in the Last Year: Never true    [address] in the Last Year: Never true   Transportation Needs: Unknown    Lack of Transportation (Medical): Not on file    Lack of Transportation (Non-Medical):  No   Physical Activity: Not on file   Stress: Not on file   Social Connections: Not on file   Intimate Partner Violence: Not on file   Housing Stability: Unknown    Unable to Pay for Housing in the Last Year: Not on file    Number of Places Lived in the Last Year: Not on file    Unstable Housing in the Last Year: No        Family History   Problem Relation Age of Onset    High Blood Pressure Mother     Arthritis Mother     Parkinsonism Mother         Parkinson's Disease    Heart Disease Father     High Blood Pressure Father     Cancer Sister         breast & bladder carcinoma    High Blood Pressure Sister     Other Sister         benign familial tremor    Cancer Sister         brain    Cancer Sister         breast    Other Brother         benign familial tremor    High Blood Pressure Son     Diabetes Paternal Grandfather        Physical Exam:    GENERAL APPEARANCE:  The patient is pleasant and well-appearing, in no acute distress A&Ox3, normal habitus  HEENT:  Normocephalic and atraumatic. No scleral icterus. Pupils are equal and round and reactive to light and accomodation. No conjunctival injection is noted. Oropharynx is clear. Mouth revealed is good dentition, no lesions. Tympanic membranes are intact. No lymphadenopathy. LUNGS:  Equal air entry and clear of auscultation bilaterally. There are not crackles, wheezes or rhonchi noted. HEART:  Regular rate and rhythm, S1/S2. No murmurs are noted. There are no lifts, heaves, or thrills noted on palpation. ABDOMEN:  Soft, non tender, non distended and no organomegaly. There are good bowel sounds. There is no rebounding or guarding. There is no evidence of hernia. SKIN:  There are no rashes, lesions, or ulcers noted. Warm and dry with good turgor. MUSCULOSKELETAL:  Gait is coordinated and smooth. B/I pedal pulses are palpable. Left wrist in brace. Distal neurovascular status intact. Patient able to move all fingers. 2+ pitting edema noted in bilateral lower extremities. NEUROLOGIC:  Cranial nerves II through XII are grossly intact. Sensation to light touch and pain is intact and bilaterally. Continuous nonsuppressible essential tremor noted On bilateral upper extremities with head-nodding. Vitals:    02/17/23 1316   BP: 136/78   Site: Right Upper Arm   Position: Sitting   Cuff Size: Medium Adult   Pulse: 72   Temp: 97.2 °F (36.2 °C)   TempSrc: Temporal   SpO2: 98%   Weight: 125 lb (56.7 kg)     Estimated body mass index is 20.18 kg/m² as calculated from the following:    Height as of 2/15/23: 5' 6\" (1.676 m). Weight as of this encounter: 125 lb (56.7 kg).         Immunization History   Administered Date(s) Administered    COVID-19, J&J, (age 18y+), IM, 0.5 mL 03/06/2021, 02/15/2022    Influenza Vaccine, unspecified formulation 12/14/2016, 10/01/2017    Influenza Virus Vaccine 11/01/2012, 11/04/2013, 10/14/2015    Influenza, FLUAD, (age 72 y+), Adjuvanted, 0.5mL 09/06/2020    Influenza, High Dose (Fluzone 65 yrs and older) 09/10/2014, 10/29/2018    Pneumococcal Conjugate 13-valent ([id number]) 12/04/2015    Pneumococcal Polysaccharide ([id number]) 03/20/2007    Td, unspecified formulation 10/20/2004    Tdap (Boostrix, Adacel) 08/19/2015       Health Maintenance   Topic Date Due    Shingles vaccine (1 of 2) Never done    COVID-19 Vaccine (3 - Booster for Brenda series) 04/12/2022    Depression Screen  01/04/2023    Annual Wellness Visit (AWV)  01/05/2023    DTaP/Tdap/Td vaccine (2 - Td or Tdap) 08/19/2025    DEXA (modify frequency per FRAX score)  Completed    Flu vaccine  Completed    Pneumococcal 65+ years Vaccine  Completed    Hepatitis A vaccine  Aged Out    Hib vaccine  Aged Out    Meningococcal (ACWY) vaccine  Aged Out       PSH, PMH, SH and FH reviewed and noted. Recent and past labs, tests and consults also reviewed. Recent or new meds also reviewed. Philippe Montanez MD    This dictation was generated by voice recognition computer software. Although all attempts are made to edit the dictation for accuracy, there may be errors in the transcription that are not intended. On the basis of positive falls risk screening, assessment and plan is as follows: referral to physical therapy provided for strength and balance training.

## 2023-02-23 ENCOUNTER — TELEPHONE (OUTPATIENT)
Dept: INTERNAL MEDICINE CLINIC | Age: 83
End: 2023-02-23

## 2023-02-23 NOTE — TELEPHONE ENCOUNTER
Anusha with [facility] care asking if patient can take Advil (PRN)? I gave her all meds on her med list and allergies. She is also requesting to know reference any edema in patients legs? Any weeping from her legs; is she supposed to be wearing compression stockings that have been noted? Please advise Asia Murillo on the patient.   It is fine to leave a VM if she doesn't answer  [phone]

## 2023-03-02 ENCOUNTER — TELEMEDICINE (OUTPATIENT)
Dept: INTERNAL MEDICINE CLINIC | Age: 83
End: 2023-03-02

## 2023-03-02 DIAGNOSIS — Z00.00 MEDICARE ANNUAL WELLNESS VISIT, SUBSEQUENT: Primary | ICD-10-CM

## 2023-03-02 ASSESSMENT — PATIENT HEALTH QUESTIONNAIRE - PHQ9
SUM OF ALL RESPONSES TO PHQ9 QUESTIONS 1 & 2: 1
2. FEELING DOWN, DEPRESSED OR HOPELESS: 0
SUM OF ALL RESPONSES TO PHQ QUESTIONS 1-9: 1
SUM OF ALL RESPONSES TO PHQ QUESTIONS 1-9: 1
1. LITTLE INTEREST OR PLEASURE IN DOING THINGS: 1
SUM OF ALL RESPONSES TO PHQ QUESTIONS 1-9: 1
SUM OF ALL RESPONSES TO PHQ QUESTIONS 1-9: 1

## 2023-03-02 ASSESSMENT — LIFESTYLE VARIABLES
HOW OFTEN DO YOU HAVE A DRINK CONTAINING ALCOHOL: NEVER
HOW MANY STANDARD DRINKS CONTAINING ALCOHOL DO YOU HAVE ON A TYPICAL DAY: PATIENT DOES NOT DRINK

## 2023-03-08 ENCOUNTER — TELEPHONE (OUTPATIENT)
Dept: INTERNAL MEDICINE CLINIC | Age: 83
End: 2023-03-08

## 2023-03-08 NOTE — PATIENT INSTRUCTIONS
"Subjective:      Patient ID: Kojo Paul is a 56 y.o. male.    Chief Complaint: Hand Pain (bilat index fingers pain 5/10 left finger / rt finger 3/10)      HPI: Kojo Paul is here for initial visit with complaints of bilateral index finger pain. Patient reports a longstanding history of bilateral hand pain, mainly in his index fingers.  He reports an injury about 4 years ago where window fell on his bilateral hands.  Pain is worse with use.  Patient reports difficulty in using his hands for small activities like "trying to open a beer can".  He has tried over-the-counter aspirin and Tylenol for pain relief with mild to moderate relief.  He denies any numbness and tingling.     Past Medical History:   Diagnosis Date    Hypothyroidism        Current Outpatient Medications:     cyclobenzaprine (FLEXERIL) 5 MG tablet, One po qhs Prn muscle spasm, Disp: 90 tablet, Rfl: 1    doxycycline (DORYX) 100 MG EC tablet, Take 100 mg by mouth daily as needed., Disp: , Rfl:     levothyroxine (SYNTHROID) 25 MCG tablet, Take 1 tablet (25 mcg total) by mouth every morning., Disp: 90 tablet, Rfl: 1    celecoxib (CELEBREX) 50 MG capsule, Take 1 capsule (50 mg total) by mouth 2 (two) times daily., Disp: 60 capsule, Rfl: 2    diclofenac sodium (PENNSAID) 20 mg/gram /actuation(2 %) sopm, Apply 40 mg topically 2 (two) times daily., Disp: 1 Bottle, Rfl: 1     Review of patient's allergies indicates:  No Known Allergies    Ht 5' 11" (1.803 m)   Wt 111.1 kg (245 lb)   BMI 34.17 kg/m²     Review of Systems   Constitution: Negative for chills and fever.   Cardiovascular: Negative for chest pain and palpitations.   Respiratory: Negative for shortness of breath and wheezing.    Skin: Negative for poor wound healing and rash.   Musculoskeletal: Positive for arthritis, joint pain, joint swelling and stiffness.   Gastrointestinal: Negative for nausea and vomiting.   Genitourinary: Negative for dysuria and hematuria.   Neurological: " Personalized Preventive Plan for Cortez Hidalgo - 3/2/2023  Medicare offers a range of preventive health benefits. Some of the tests and screenings are paid in full while other may be subject to a deductible, co-insurance, and/or copay. Some of these benefits include a comprehensive review of your medical history including lifestyle, illnesses that may run in your family, and various assessments and screenings as appropriate. After reviewing your medical record and screening and assessments performed today your provider may have ordered immunizations, labs, imaging, and/or referrals for you. A list of these orders (if applicable) as well as your Preventive Care list are included within your After Visit Summary for your review. Other Preventive Recommendations:    A preventive eye exam performed by an eye specialist is recommended every 1-2 years to screen for glaucoma; cataracts, macular degeneration, and other eye disorders. A preventive dental visit is recommended every 6 months. Try to get at least 150 minutes of exercise per week or 10,000 steps per day on a pedometer . Order or download the FREE \"Exercise & Physical Activity: Your Everyday Guide\" from The [employer]. Call 3-392.974.9007 or search The [employer] online. You need 7455-4290 mg of calcium and 1636-3564 IU of vitamin D per day. It is possible to meet your calcium requirement with diet alone, but a vitamin D supplement is usually necessary to meet this goal.  When exposed to the sun, use a sunscreen that protects against both UVA and UVB radiation with an SPF of 30 or greater. Reapply every 2 to 3 hours or after sweating, drying off with a towel, or swimming. Always wear a seat belt when traveling in a car. Always wear a helmet when riding a bicycle or motorcycle. Negative for numbness, paresthesias, seizures and tremors.   Psychiatric/Behavioral: Negative for altered mental status.   Allergic/Immunologic: Negative for environmental allergies and persistent infections.         Objective:    Ortho Exam       Left hand:  Significant for bony enlargement of the index, middle, and ring finger.  Most significantly in the index finger.  There is limited range of motion and stiffness of the DIP of the index finger.  ROM remaining fingers full. Tenderness to palpation of bony enlargement.  Sensation intact.  Skin intact. Pulses present. Cap refill brisk.  Right hand:  Significant for bony enlargement of the index finger.  Tenderness to palpation.  Limited range of motion and stiffness of the DIP of the index finger.  Remaining fingers ROM  Full.  Sensation intact.  Pulses present. Cap refill brisk.    GEN: Well developed, well nourished male. AAOX3. No acute distress.   Normocephalic, atraumatic.   INGE  Breathing unlabored.  Mood and affect appropriate.   Assessment:     Imaging:  Bilateral hand radiographs from 10/17/2018 reveal significant erosive arthritis of the DIP joints of the bilateral index fingers.  And degenerative changes scattered throughout the remaining DIP and PIP joints.        1. Erosive (osteo)arthritis          Plan:       Orders Placed This Encounter    celecoxib (CELEBREX) 50 MG capsule    diclofenac sodium (PENNSAID) 20 mg/gram /actuation(2 %) sopm      Explained the nature of problem to the patient in regards to the risks of arthritis.  He has not tried any consistent treatment for this in the past.  I would like for him to try an oral anti-inflammatory and a topical anti-inflammatory for 1 month duration.  If symptoms do not improve, we will consider injections of the bilateral DIP joint at next visit.  I also explained the potential role of surgery in the treatment of this condition. They understand that if non surgical measures do not adequately control  symptoms, surgery will be considered in the future.   Follow-up in about 4 weeks (around 11/22/2018).

## 2023-03-08 NOTE — TELEPHONE ENCOUNTER
[address] calling regarding patient's PT visits. Patient was seen a couple of times and is doing well. Patient was d/c'd yesterday, 3/7.     LUTHER

## 2023-03-08 NOTE — PROGRESS NOTES
Medicare Annual Wellness Visit    Ronald Irizarry is here for Medicare AWV    Assessment & Plan   Medicare annual wellness visit, subsequent      Recommendations for Preventive Services Due: see orders and patient instructions/AVS.  Recommended screening schedule for the next 5-10 years is provided to the patient in written form: see Patient Instructions/AVS.     Return in about 1 year (around 3/4/2024) for awv. Subjective     Patient's complete Health Risk Assessment and screening values have been reviewed and are found in Flowsheets. The following problems were reviewed today and where indicated follow up appointments were made and/or referrals ordered. Positive Risk Factor Screenings with Interventions:    Fall Risk:  Do you feel unsteady or are you worried about falling? : (!) yes  2 or more falls in past year?: no  Fall with injury in past year?: (!) yes     Interventions:    Patient has been advised to use a cane to help stabilizer her. She states her most recent fall was due to her dog pulling her down. Dentist Screen:  Have you seen the dentist within the past year?: (!) No    Intervention:  Advised to schedule with their dentist     Vision Screen:  Do you have difficulty driving, watching TV, or doing any of your daily activities because of your eyesight?: No  Have you had an eye exam within the past year?: (!) No  No results found. Interventions:   Patient encouraged to make appointment with their eye specialist      Advanced Directives:  Do you have a Living Will?: (!) No    Intervention:  has NO advanced directive - information provided             Objective      Patient-Reported Vitals  No data recorded           Allergies   Allergen Reactions    Codeine Nausea And Vomiting     Prior to Visit Medications    Medication Sig Taking?  Authorizing Provider   propranolol (INDERAL LA) 120 MG extended release capsule TAKE 1 CAPSULE BY MOUTH EVERY DAY Yes CELESTINE Yepez   lisinopril (PRINIVIL;ZESTRIL) 2.5 MG tablet TAKE 1 TABLET BY MOUTH EVERY DAY Yes Khushboo WALKER [place], [place]   Cholecalciferol (VITAMIN D) 50 MCG (2000 UT) CAPS capsule Take by mouth Yes Historical Provider, MD   Omega-3 Fatty Acids (FISH OIL PO) Take 1 capsule by mouth daily Yes Historical Provider, MD   Multiple Vitamins-Minerals (MULTIVITAMIN ADULT PO) Take 1 tablet by mouth daily Yes Historical Provider, MD Smith (Including outside providers/suppliers regularly involved in providing care):   Patient Care Team:  Shyla Marte MD as PCP - General (Internal Medicine)  [facility]e [facility]  Tracey Ingram MD (General Surgery)     Reviewed and updated this visit:  Allergies  Meds  Med Hx  Surg Hx  Soc Hx  Fam Hx           Stephania Venegas, was evaluated through a synchronous (real-time) audio-video encounter. The patient (or guardian if applicable) is aware that this is a billable service, which includes applicable co-pays. This Virtual Visit was conducted with patient's (and/or legal guardian's) consent. The visit was conducted pursuant to the emergency declaration under the [address], [facility] authority and the [employer] General Act. Patient identification was verified, and a caregiver was present when appropriate.    The patient was located at Home: [address] [facility] [address] [facility]  Provider was located at Home ([address]): [address] [facility], [address] [facility]

## 2023-03-13 NOTE — PROGRESS NOTES
I don't think I am able to sign as I did not perform the visit.  The rules for LPN's performing AWVs only apply to MDs

## 2023-03-15 ENCOUNTER — OFFICE VISIT (OUTPATIENT)
Dept: ORTHOPEDIC SURGERY | Age: 83
End: 2023-03-15

## 2023-03-15 VITALS — RESPIRATION RATE: 16 BRPM | BODY MASS INDEX: 20.09 KG/M2 | WEIGHT: 125 LBS | HEIGHT: 66 IN

## 2023-03-15 DIAGNOSIS — S52.602A CLOSED FRACTURE DISTAL RADIUS AND ULNA, LEFT, INITIAL ENCOUNTER: Primary | ICD-10-CM

## 2023-03-15 DIAGNOSIS — S52.502A CLOSED FRACTURE DISTAL RADIUS AND ULNA, LEFT, INITIAL ENCOUNTER: Primary | ICD-10-CM

## 2023-03-15 PROCEDURE — 99024 POSTOP FOLLOW-UP VISIT: CPT | Performed by: PHYSICIAN ASSISTANT

## 2023-03-15 NOTE — PROGRESS NOTES
Ms. Josephine Naik returns today in follow-up of her recent left Distal Radius Fracture Repair done approximately 5 weeks ago. She has done well noting no discomfort and no other reported complications. She presents to her visit today without her brace and states that she has not been wearing for the last few weeks. She notes no symptoms of numbness, tingling, no symptoms related to perfusion. Physical Exam:  Skin incision is healing well, without erythema, drainage or sign of infection. Digital range of motion is without significant limitation. FPL function is Intact, EPL function is Intact  Wrist range of motion is without significant limitationand not stressed today. Sensation is normal in the Whole Hand. Vascular examination reveals normal, good capillary refill, and good color. Swelling is minimal.  There is no clinical evidence of residual skeletal deformity. Radiographic Evaluation:  Radiographs were obtained today (3 views of the left wrist). They demonstrate good restoration of alignment of the fracture fragments with sign of hardware loosening. There is evidence of one screw loosening. There is no evidence failure of fixation. Impression:  Ms. Josephine Naik is doing fairly well after recent left Distal Radius Fracture Repair. Plan:  Ms. Josephine Naik is today fitted with a removable forearm based protective orthosis and given instructions regarding the appropriate wear and use of the device. She is also instructed in work on Active & Passive range of motion of the digits, wrist, & elbow. These modalities were specifically demonstrated to her today. We discussed the necessary restrictions on the use of the injured hand & wrist and the limitations on resumption of activities. She is given instructions regarding management of the fresh surgical incision and progressive use of desensitization and tissue massage techniques.   We discussed the appropriate expectations and timeline for symptom improvement. I reiterated the importance of her continuing to wear her brace and limit what she is doing with her left hand and wrist. I believe that she has been doing way more than she should be doing. I again explained that she should not be lifting more than 1-2 lbs with her left hand/wrist. She understood our discussion. We discussed the option of pursuing formalized hand therapy and a prescription  was not indicated. She is provided a written patient instruction sheet titled: Postoperative Instructions After Wrist Fracture Repair -- #1. As this patient has demonstrated risk-factors for Osteoporosis, and recent history of a fracture, I have referred her back to her Primary Care Physician for evaluation of Osteoporosis including possible consideration for DEXA Scan, if this is felt to be clinically indicated. Ms. Carmen is instructed to contact her Primary Care Physician to discuss and arrange such evaluation. I have asked Ms. Carmen to follow-up with me by scheduling an appointment for 3 weeks from now at which time we will obtain repeat radiographs of the wrist out of the splint. She is also specifically instructed to return to the office or call for an appointment sooner if her symptoms are changing or worsening prior to that time.

## 2023-03-20 DIAGNOSIS — G25.0 BENIGN FAMILIAL TREMOR: Chronic | ICD-10-CM

## 2023-03-20 DIAGNOSIS — I10 ESSENTIAL HYPERTENSION: ICD-10-CM

## 2023-03-20 RX ORDER — PROPRANOLOL HYDROCHLORIDE 120 MG/1
CAPSULE, EXTENDED RELEASE ORAL
Qty: 90 CAPSULE | Refills: 1 | Status: SHIPPED | OUTPATIENT
Start: 2023-03-20

## 2023-03-21 RX ORDER — LISINOPRIL 2.5 MG/1
TABLET ORAL
Qty: 90 TABLET | Refills: 1 | Status: SHIPPED | OUTPATIENT
Start: 2023-03-21

## 2023-04-05 ENCOUNTER — OFFICE VISIT (OUTPATIENT)
Dept: ORTHOPEDIC SURGERY | Age: 83
End: 2023-04-05

## 2023-04-05 VITALS — BODY MASS INDEX: 20.09 KG/M2 | HEIGHT: 66 IN | WEIGHT: 125 LBS | RESPIRATION RATE: 16 BRPM

## 2023-04-05 DIAGNOSIS — S52.502A CLOSED FRACTURE DISTAL RADIUS AND ULNA, LEFT, INITIAL ENCOUNTER: Primary | ICD-10-CM

## 2023-04-05 DIAGNOSIS — S52.602A CLOSED FRACTURE DISTAL RADIUS AND ULNA, LEFT, INITIAL ENCOUNTER: Primary | ICD-10-CM

## 2023-04-05 PROCEDURE — 99024 POSTOP FOLLOW-UP VISIT: CPT | Performed by: ORTHOPAEDIC SURGERY

## 2023-04-05 NOTE — PROGRESS NOTES
evaluation. We discussed the option of pursuing formalized hand therapy and a prescription  was not indicated. She is provided a written patient instruction sheet titled: Postoperative Instructions After Wrist Fracture Repair -- #3. As this patient has demonstrated risk-factors for Osteoporosis, and recent history of a fracture, I have referred her back to her Primary Care Physician for evaluation of Osteoporosis including possible consideration for DEXA Scan, if this is felt to be clinically indicated. Ms. Lisa Appiah is instructed to contact her Primary Care Physician to discuss and arrange such evaluation. I have asked Ms. Lisa Appiah to follow-up with me at any time in the future if she has persistent or residual symptoms or difficulties. She may contact me as she feels necessary or if any future concerns arise.

## 2023-04-05 NOTE — Clinical Note
Dear  Mary Chiu MD,  Thank you very much for your referral or Ms. Brad Blanco to me for evaluation and treatment of her Hand & Wrist condition. I appreciate your confidence in me and thank you for allowing me the opportunity to care for your patients. If I can be of any further assistance to you on this or any other patient, please do not hesitate to contact me. Sincerely,  Bar Lara.  Marce Verde MD

## 2023-04-05 NOTE — PATIENT INSTRUCTIONS
Thank you for choosing [facility]icians for your Hand and Upper Extremity needs. If we can be of any further assistance to you, please do not hesitate to contact us.     Office Phone Number:  [phone]  or  [phone]

## 2023-05-03 ENCOUNTER — HOSPITAL ENCOUNTER (OUTPATIENT)
Dept: CARDIOLOGY | Age: 83
Discharge: HOME | End: 2023-05-03
Payer: MEDICARE

## 2023-05-03 ENCOUNTER — HOSPITAL ENCOUNTER (OUTPATIENT)
Dept: WOMENS IMAGING | Age: 83
Discharge: HOME | End: 2023-05-03
Payer: MEDICARE

## 2023-05-03 DIAGNOSIS — M85.89 OSTEOPENIA OF MULTIPLE SITES: Chronic | ICD-10-CM

## 2023-05-03 LAB
LV EF: 56 %
LVEF MODALITY: NORMAL

## 2023-05-03 PROCEDURE — 77080 DXA BONE DENSITY AXIAL: CPT

## 2023-05-03 PROCEDURE — 76377 3D RENDER W/INTRP POSTPROCES: CPT

## 2023-05-03 PROCEDURE — 93306 TTE W/DOPPLER COMPLETE: CPT

## 2023-05-04 ENCOUNTER — TELEPHONE (OUTPATIENT)
Dept: INTERNAL MEDICINE CLINIC | Age: 83
End: 2023-05-04

## 2023-05-05 ENCOUNTER — HOSPITAL ENCOUNTER (OUTPATIENT)
Age: 83
Discharge: HOME | End: 2023-05-05
Payer: MEDICARE

## 2023-05-05 DIAGNOSIS — Z00.00 PREVENTATIVE HEALTH CARE: ICD-10-CM

## 2023-05-05 DIAGNOSIS — G25.0 BENIGN FAMILIAL TREMOR: Chronic | ICD-10-CM

## 2023-05-05 DIAGNOSIS — R73.03 PREDIABETES: ICD-10-CM

## 2023-05-05 LAB
ALBUMIN SERPL-MCNC: 4.5 G/DL (ref 3.4–5)
ALBUMIN/GLOB SERPL: 2 {RATIO} (ref 1.1–2.2)
ALP SERPL-CCNC: 110 U/L (ref 40–129)
ALT SERPL-CCNC: 24 U/L (ref 10–40)
ANION GAP SERPL CALCULATED.3IONS-SCNC: 11 MMOL/L (ref 3–16)
AST SERPL-CCNC: 19 U/L (ref 15–37)
BASOPHILS # BLD: 0.1 K/UL (ref 0–0.2)
BASOPHILS NFR BLD: 1.2 %
BILIRUB SERPL-MCNC: 0.6 MG/DL (ref 0–1)
BUN SERPL-MCNC: 18 MG/DL (ref 7–20)
CALCIUM SERPL-MCNC: 9.4 MG/DL (ref 8.3–10.6)
CHLORIDE SERPL-SCNC: 93 MMOL/L (ref 99–110)
CHOLEST SERPL-MCNC: 181 MG/DL (ref 0–199)
CO2 SERPL-SCNC: 26 MMOL/L (ref 21–32)
CREAT SERPL-MCNC: 0.9 MG/DL (ref 0.6–1.2)
DEPRECATED RDW RBC AUTO: 13.8 % (ref 12.4–15.4)
EOSINOPHIL # BLD: 0.1 K/UL (ref 0–0.6)
EOSINOPHIL NFR BLD: 1.4 %
GFR SERPLBLD CREATININE-BSD FMLA CKD-EPI: >60 ML/MIN/{1.73_M2}
GLUCOSE SERPL-MCNC: 99 MG/DL (ref 70–99)
HCT VFR BLD AUTO: 42.2 % (ref 36–48)
HDLC SERPL-MCNC: 67 MG/DL (ref 40–60)
HGB BLD-MCNC: 14.1 G/DL (ref 12–16)
LDLC SERPL CALC-MCNC: 95 MG/DL
LYMPHOCYTES # BLD: 2 K/UL (ref 1–5.1)
LYMPHOCYTES NFR BLD: 31.1 %
MCH RBC QN AUTO: 31.7 PG (ref 26–34)
MCHC RBC AUTO-ENTMCNC: 33.4 G/DL (ref 31–36)
MCV RBC AUTO: 94.9 FL (ref 80–100)
MONOCYTES # BLD: 0.9 K/UL (ref 0–1.3)
MONOCYTES NFR BLD: 13.9 %
NEUTROPHILS # BLD: 3.4 K/UL (ref 1.7–7.7)
NEUTROPHILS NFR BLD: 52.4 %
PLATELET # BLD AUTO: 206 K/UL (ref 135–450)
PMV BLD AUTO: 9 FL (ref 5–10.5)
POTASSIUM SERPL-SCNC: 5.4 MMOL/L (ref 3.5–5.1)
PROT SERPL-MCNC: 6.8 G/DL (ref 6.4–8.2)
RBC # BLD AUTO: 4.44 M/UL (ref 4–5.2)
SODIUM SERPL-SCNC: 130 MMOL/L (ref 136–145)
TRIGL SERPL-MCNC: 97 MG/DL (ref 0–150)
TSH SERPL DL<=0.005 MIU/L-ACNC: 1.93 UIU/ML (ref 0.27–4.2)
VLDLC SERPL CALC-MCNC: 19 MG/DL
WBC # BLD AUTO: 6.5 K/UL (ref 4–11)

## 2023-05-05 PROCEDURE — 85025 COMPLETE CBC W/AUTO DIFF WBC: CPT

## 2023-05-05 PROCEDURE — 84443 ASSAY THYROID STIM HORMONE: CPT

## 2023-05-05 PROCEDURE — 83036 HEMOGLOBIN GLYCOSYLATED A1C: CPT

## 2023-05-05 PROCEDURE — 36415 COLL VENOUS BLD VENIPUNCTURE: CPT

## 2023-05-05 PROCEDURE — 80053 COMPREHEN METABOLIC PANEL: CPT

## 2023-05-05 PROCEDURE — 80061 LIPID PANEL: CPT

## 2023-05-06 LAB
EST. AVERAGE GLUCOSE BLD GHB EST-MCNC: 125.5 MG/DL
HBA1C MFR BLD: 6 %

## 2023-05-07 DIAGNOSIS — E87.5 HYPERKALEMIA: Primary | ICD-10-CM

## 2023-05-12 ENCOUNTER — OFFICE VISIT (OUTPATIENT)
Dept: INTERNAL MEDICINE CLINIC | Age: 83
End: 2023-05-12

## 2023-05-12 DIAGNOSIS — I27.20 MILD PULMONARY HYPERTENSION (HCC): ICD-10-CM

## 2023-05-12 DIAGNOSIS — M85.89 OSTEOPENIA OF MULTIPLE SITES: ICD-10-CM

## 2023-05-12 DIAGNOSIS — I10 ESSENTIAL HYPERTENSION: ICD-10-CM

## 2023-05-12 DIAGNOSIS — E87.5 HYPERKALEMIA: ICD-10-CM

## 2023-05-12 DIAGNOSIS — G25.0 BENIGN FAMILIAL TREMOR: Primary | ICD-10-CM

## 2023-05-12 DIAGNOSIS — R73.03 PREDIABETES: ICD-10-CM

## 2023-05-12 PROBLEM — Z87.891 PERSONAL HISTORY OF NICOTINE DEPENDENCE: Status: ACTIVE | Noted: 2023-02-21

## 2023-05-12 PROBLEM — Z91.81 HISTORY OF FALLING: Status: ACTIVE | Noted: 2023-02-21

## 2023-05-12 PROBLEM — Z60.2 PROBLEMS RELATED TO LIVING ALONE: Status: ACTIVE | Noted: 2023-02-21

## 2023-05-12 RX ORDER — FUROSEMIDE 20 MG/1
20 TABLET ORAL DAILY
Qty: 30 TABLET | Refills: 0 | Status: SHIPPED | OUTPATIENT
Start: 2023-05-12 | End: 2023-06-11

## 2023-05-12 NOTE — PROGRESS NOTES
Mor   2023    Venkat Murray (:  1940) is a 82 y.o. female, here for evaluation of the following medical concerns:    Chief Complaint   Patient presents with    Follow-up    Discuss Labs        ASSESSMENT/ PLAN  1. Benign familial tremor  -Continue on Inderal.  Thyroid function normal.  Has been seen by neurology in the past    2. Essential hypertension  -Blood pressure is at goal.  She is currently on lisinopril. We will repeat her potassium levels. If persistent elevation discontinue lisinopril    3. Prediabetes  -Most recent A1c of 6. Counseled on diet weight loss and exercise    5. Osteopenia of multiple sites  -Noted osteopenia on recent DEXA 5/3/2023. Continue on calcium and vitamin D supplements    6. Mild pulmonary hypertension ([address]  -Recent echo notable for mild pulmonary hypertension. Patient does have bilateral trace pitting edema. Discussed etiology of pulmonary hypertension in background of longstanding hypertension. EF of 56%. Discussed good control of blood pressure. Start on Lasix as needed. - furosemide (LASIX) 20 MG tablet; Take 1 tablet by mouth daily  Dispense: 30 tablet; Refill: 0    7. Hyperkalemia  -As above. Repeat BMP. If persistently elevated plan to discontinue lisinopril also counseled on low potassium diet  - Basic Metabolic Panel; Future       Return in about 6 months (around 2023) for HTN .     Lab Review   Hospital Outpatient Visit on 2023   Component Date Value    Hemoglobin A1C 2023 6.0     eAG 2023 125.5     TSH Reflex FT4 2023 1.93     Cholesterol, Total 2023 181     Triglycerides 2023 97     HDL 2023 67 (H)     LDL Calculated 2023 95     VLDL Cholesterol Calcula* 2023 19     Sodium 2023 130 (L)     Potassium 2023 5.4 (H)     Chloride 2023 93 (L)     CO2 2023 26     Anion Gap 2023 11     Glucose 2023 99     BUN 2023 18     Creatinine 2023 0.9

## 2023-05-14 VITALS
TEMPERATURE: 97.6 F | WEIGHT: 124 LBS | SYSTOLIC BLOOD PRESSURE: 134 MMHG | OXYGEN SATURATION: 95 % | HEART RATE: 92 BPM | DIASTOLIC BLOOD PRESSURE: 76 MMHG | BODY MASS INDEX: 20.01 KG/M2

## 2023-09-14 DIAGNOSIS — G25.0 BENIGN FAMILIAL TREMOR: Chronic | ICD-10-CM

## 2023-09-14 DIAGNOSIS — I10 ESSENTIAL HYPERTENSION: ICD-10-CM

## 2023-09-14 DIAGNOSIS — I27.20 MILD PULMONARY HYPERTENSION (HCC): ICD-10-CM

## 2023-09-14 RX ORDER — FUROSEMIDE 20 MG/1
20 TABLET ORAL DAILY
Qty: 30 TABLET | Refills: 0 | Status: SHIPPED | OUTPATIENT
Start: 2023-09-14

## 2023-09-14 RX ORDER — PROPRANOLOL HYDROCHLORIDE 120 MG/1
CAPSULE, EXTENDED RELEASE ORAL
Qty: 90 CAPSULE | Refills: 0 | Status: SHIPPED | OUTPATIENT
Start: 2023-09-14

## 2023-09-14 RX ORDER — LISINOPRIL 2.5 MG/1
TABLET ORAL
Qty: 90 TABLET | Refills: 0 | OUTPATIENT
Start: 2023-09-14

## 2023-09-14 NOTE — TELEPHONE ENCOUNTER
Refill request for LISINOPRIL, PROPRANOLOL, FUROSEMIDE medication.      Name of Anthony8 CARLOTA Warren      Last visit - 5/122/23     Pending visit - 11/10/23      Last refill -6/6/23, 5/12/23      Medication Contract signed -   Last Oarrs ran-         Additional Comments

## 2023-09-17 DIAGNOSIS — I10 ESSENTIAL HYPERTENSION: ICD-10-CM

## 2023-09-18 ENCOUNTER — TELEPHONE (OUTPATIENT)
Dept: INTERNAL MEDICINE CLINIC | Age: 83
End: 2023-09-18

## 2023-09-18 DIAGNOSIS — I10 ESSENTIAL HYPERTENSION: ICD-10-CM

## 2023-09-18 RX ORDER — LISINOPRIL 2.5 MG/1
TABLET ORAL
Qty: 90 TABLET | Refills: 0 | OUTPATIENT
Start: 2023-09-18

## 2023-09-18 NOTE — TELEPHONE ENCOUNTER
----- Message from Tyra Perkins sent at 9/18/2023  1:41 PM EDT -----  Subject: Message to Provider    QUESTIONS  Information for Provider? PT'S SON CALLED FOR HER REFILL AND WAS TOLD   CAN'T BE REFILLED DUE TO NEEDING BLOODWORK NEEDS lisinopril   (PRINIVIL;ZESTRIL) 2.5 MG tab PATIENT IS ALL OUT OF THIS MEDICATION PLEASE   [place] [phone] ASAP  ---------------------------------------------------------------------------  --------------  Ke FONG  [phone]; OK to leave message on voicemail  ---------------------------------------------------------------------------  --------------  SCRIPT ANSWERS  Relationship to Patient? Other/Third Party  Representative Name? DAUGHTER  Is the representative on the Communication Release of Information (VICKY)   form in Epic?  Yes

## 2023-09-18 NOTE — TELEPHONE ENCOUNTER
Refill request for lisinopril (PRINIVIL;ZESTRIL) 2.5 MG tablet medication. Name of America Warren      Last visit - 05/12/23     Pending visit - 11/10/23    Last refill -  03/21/23    Medication Contract signed - PDMP Monitoring:    Last PDMP Gigi Cowden as Reviewed:  Review User Review Instant Review Result          [unfilled]  Urine Drug Screenings (1 yr)    No resulted procedures found.        Medication Contract and Consent for Opioid Use Documents Filed        No documents found                    Last White Ezra ran-         Additional Comments

## 2023-09-18 NOTE — TELEPHONE ENCOUNTER
Pt's daughter thinks her mothers labs were a mistake. She would like a call from pcp in regards to why she needs lab work done. I explained that she needed them done due to potassium being high with her last labs and lisinopril could cause this to get higher.  Her medications will not be refilled unless she completes her kidney function test which the dr put in orders for

## 2023-09-19 NOTE — TELEPHONE ENCOUNTER
Spoke with patient's daughter. She will get her mother to the lab by the end of this week.   After the results are out we will decide what to do with the lisinopril

## 2023-09-22 ENCOUNTER — HOSPITAL ENCOUNTER (OUTPATIENT)
Age: 83
Setting detail: SPECIMEN
Discharge: HOME | End: 2023-09-22
Payer: MEDICARE

## 2023-09-22 DIAGNOSIS — E87.5 HYPERKALEMIA: ICD-10-CM

## 2023-09-22 LAB
ANION GAP SERPL CALCULATED.3IONS-SCNC: 13 MMOL/L (ref 3–16)
BUN SERPL-MCNC: 15 MG/DL (ref 7–20)
CALCIUM SERPL-MCNC: 9 MG/DL (ref 8.3–10.6)
CHLORIDE SERPL-SCNC: 94 MMOL/L (ref 99–110)
CO2 SERPL-SCNC: 24 MMOL/L (ref 21–32)
CREAT SERPL-MCNC: 0.8 MG/DL (ref 0.6–1.2)
GFR SERPLBLD CREATININE-BSD FMLA CKD-EPI: >60 ML/MIN/{1.73_M2}
GLUCOSE SERPL-MCNC: 102 MG/DL (ref 70–99)
POTASSIUM SERPL-SCNC: 4.8 MMOL/L (ref 3.5–5.1)
SODIUM SERPL-SCNC: 131 MMOL/L (ref 136–145)

## 2023-09-22 PROCEDURE — 80048 BASIC METABOLIC PNL TOTAL CA: CPT

## 2023-09-22 PROCEDURE — 36415 COLL VENOUS BLD VENIPUNCTURE: CPT

## 2023-09-27 DIAGNOSIS — I10 ESSENTIAL HYPERTENSION: ICD-10-CM

## 2023-09-28 ENCOUNTER — TELEPHONE (OUTPATIENT)
Dept: INTERNAL MEDICINE CLINIC | Age: 83
End: 2023-09-28

## 2023-09-28 DIAGNOSIS — I10 ESSENTIAL HYPERTENSION: ICD-10-CM

## 2023-09-28 RX ORDER — LISINOPRIL 2.5 MG/1
TABLET ORAL
Qty: 90 TABLET | Refills: 0 | OUTPATIENT
Start: 2023-09-28

## 2023-09-28 RX ORDER — LISINOPRIL 2.5 MG/1
TABLET ORAL
Qty: 90 TABLET | Refills: 1 | Status: SHIPPED | OUTPATIENT
Start: 2023-09-28

## 2023-09-28 NOTE — TELEPHONE ENCOUNTER
----- Message from Zaheer Marshall sent at 9/27/2023  3:59 PM EDT -----  Subject: Results Request    QUESTIONS  Results: LAB RESULTS - SON DEREK WANTS TO KNOW IF PT IS STILL SUPPOSED TO BE   TAKING LISINOPRIL; Ordered by:   Date Performed: 2023-09-22  ---------------------------------------------------------------------------  --------------  Alan Shaver INFO    [phone]; OK to leave message on voicemail  ---------------------------------------------------------------------------  --------------

## 2023-11-08 ENCOUNTER — HOSPITAL ENCOUNTER (INPATIENT)
Age: 83
LOS: 2 days | Discharge: HOME | End: 2023-11-10
Attending: STUDENT IN AN ORGANIZED HEALTH CARE EDUCATION/TRAINING PROGRAM | Admitting: STUDENT IN AN ORGANIZED HEALTH CARE EDUCATION/TRAINING PROGRAM
Payer: MEDICARE

## 2023-11-08 ENCOUNTER — APPOINTMENT (OUTPATIENT)
Dept: ULTRASOUND IMAGING | Age: 83
End: 2023-11-08
Payer: MEDICARE

## 2023-11-08 ENCOUNTER — APPOINTMENT (OUTPATIENT)
Dept: GENERAL RADIOLOGY | Age: 83
End: 2023-11-08
Payer: MEDICARE

## 2023-11-08 DIAGNOSIS — N30.01 ACUTE CYSTITIS WITH HEMATURIA: ICD-10-CM

## 2023-11-08 DIAGNOSIS — I27.20 MILD PULMONARY HYPERTENSION (HCC): ICD-10-CM

## 2023-11-08 DIAGNOSIS — R53.1 GENERAL WEAKNESS: ICD-10-CM

## 2023-11-08 DIAGNOSIS — R00.1 SYMPTOMATIC BRADYCARDIA: Primary | ICD-10-CM

## 2023-11-08 PROBLEM — N39.0 UTI (URINARY TRACT INFECTION): Status: ACTIVE | Noted: 2023-11-08

## 2023-11-08 LAB
ALBUMIN SERPL-MCNC: 4.1 G/DL (ref 3.4–5)
ALBUMIN/GLOB SERPL: 1.5 {RATIO} (ref 1.1–2.2)
ALP SERPL-CCNC: 141 U/L (ref 40–129)
ALT SERPL-CCNC: 96 U/L (ref 10–40)
ANION GAP SERPL CALCULATED.3IONS-SCNC: 13 MMOL/L (ref 3–16)
AST SERPL-CCNC: 92 U/L (ref 15–37)
BACTERIA URNS QL MICRO: ABNORMAL /HPF
BASOPHILS # BLD: 0.1 K/UL (ref 0–0.2)
BASOPHILS NFR BLD: 0.7 %
BILIRUB SERPL-MCNC: 2.3 MG/DL (ref 0–1)
BILIRUB UR QL STRIP.AUTO: ABNORMAL
BUN SERPL-MCNC: 19 MG/DL (ref 7–20)
CALCIUM SERPL-MCNC: 9.3 MG/DL (ref 8.3–10.6)
CHLORIDE SERPL-SCNC: 95 MMOL/L (ref 99–110)
CK SERPL-CCNC: 125 U/L (ref 26–192)
CLARITY UR: ABNORMAL
CO2 SERPL-SCNC: 26 MMOL/L (ref 21–32)
COLOR UR: ABNORMAL
CREAT SERPL-MCNC: 0.9 MG/DL (ref 0.6–1.2)
DEPRECATED RDW RBC AUTO: 14 % (ref 12.4–15.4)
EKG ATRIAL RATE: 58 BPM
EKG DIAGNOSIS: NORMAL
EKG P-R INTERVAL: 144 MS
EKG Q-T INTERVAL: 494 MS
EKG QRS DURATION: 98 MS
EKG QTC CALCULATION (BAZETT): 484 MS
EKG R AXIS: 78 DEGREES
EKG T AXIS: 71 DEGREES
EKG VENTRICULAR RATE: 58 BPM
EOSINOPHIL # BLD: 0 K/UL (ref 0–0.6)
EOSINOPHIL NFR BLD: 0.1 %
EPI CELLS #/AREA URNS HPF: ABNORMAL /HPF (ref 0–5)
GFR SERPLBLD CREATININE-BSD FMLA CKD-EPI: >60 ML/MIN/{1.73_M2}
GLUCOSE SERPL-MCNC: 109 MG/DL (ref 70–99)
GLUCOSE UR STRIP.AUTO-MCNC: NEGATIVE MG/DL
HCT VFR BLD AUTO: 39.6 % (ref 36–48)
HGB BLD-MCNC: 13.3 G/DL (ref 12–16)
HGB UR QL STRIP.AUTO: ABNORMAL
KETONES UR STRIP.AUTO-MCNC: ABNORMAL MG/DL
LEUKOCYTE ESTERASE UR QL STRIP.AUTO: ABNORMAL
LYMPHOCYTES # BLD: 1.2 K/UL (ref 1–5.1)
LYMPHOCYTES NFR BLD: 10.6 %
MCH RBC QN AUTO: 32 PG (ref 26–34)
MCHC RBC AUTO-ENTMCNC: 33.5 G/DL (ref 31–36)
MCV RBC AUTO: 95.7 FL (ref 80–100)
MONOCYTES # BLD: 1.1 K/UL (ref 0–1.3)
MONOCYTES NFR BLD: 10.1 %
NEUTROPHILS # BLD: 8.7 K/UL (ref 1.7–7.7)
NEUTROPHILS NFR BLD: 78.5 %
NITRITE UR QL STRIP.AUTO: POSITIVE
PH UR STRIP.AUTO: 5 [PH] (ref 5–8)
PLATELET # BLD AUTO: 148 K/UL (ref 135–450)
PMV BLD AUTO: 9.2 FL (ref 5–10.5)
POTASSIUM SERPL-SCNC: 3.9 MMOL/L (ref 3.5–5.1)
PROT SERPL-MCNC: 6.9 G/DL (ref 6.4–8.2)
PROT UR STRIP.AUTO-MCNC: 100 MG/DL
RBC # BLD AUTO: 4.14 M/UL (ref 4–5.2)
RBC #/AREA URNS HPF: ABNORMAL /HPF (ref 0–4)
SODIUM SERPL-SCNC: 134 MMOL/L (ref 136–145)
SP GR UR STRIP.AUTO: >=1.03 (ref 1–1.03)
TROPONIN, HIGH SENSITIVITY: 17 NG/L (ref 0–14)
TROPONIN, HIGH SENSITIVITY: 18 NG/L (ref 0–14)
UA COMPLETE W REFLEX CULTURE PNL UR: YES
UA DIPSTICK W REFLEX MICRO PNL UR: YES
URN SPEC COLLECT METH UR: ABNORMAL
UROBILINOGEN UR STRIP-ACNC: 1 E.U./DL
WBC # BLD AUTO: 11.1 K/UL (ref 4–11)
WBC #/AREA URNS HPF: >100 /HPF (ref 0–5)

## 2023-11-08 PROCEDURE — 87077 CULTURE AEROBIC IDENTIFY: CPT

## 2023-11-08 PROCEDURE — 93005 ELECTROCARDIOGRAM TRACING: CPT | Performed by: STUDENT IN AN ORGANIZED HEALTH CARE EDUCATION/TRAINING PROGRAM

## 2023-11-08 PROCEDURE — 76705 ECHO EXAM OF ABDOMEN: CPT

## 2023-11-08 PROCEDURE — 81001 URINALYSIS AUTO W/SCOPE: CPT

## 2023-11-08 PROCEDURE — 2580000003 HC RX 258: Performed by: PHYSICIAN ASSISTANT

## 2023-11-08 PROCEDURE — 93010 ELECTROCARDIOGRAM REPORT: CPT | Performed by: INTERNAL MEDICINE

## 2023-11-08 PROCEDURE — 2580000003 HC RX 258: Performed by: STUDENT IN AN ORGANIZED HEALTH CARE EDUCATION/TRAINING PROGRAM

## 2023-11-08 PROCEDURE — 84484 ASSAY OF TROPONIN QUANT: CPT

## 2023-11-08 PROCEDURE — 87086 URINE CULTURE/COLONY COUNT: CPT

## 2023-11-08 PROCEDURE — 6360000002 HC RX W HCPCS: Performed by: STUDENT IN AN ORGANIZED HEALTH CARE EDUCATION/TRAINING PROGRAM

## 2023-11-08 PROCEDURE — 6370000000 HC RX 637 (ALT 250 FOR IP): Performed by: STUDENT IN AN ORGANIZED HEALTH CARE EDUCATION/TRAINING PROGRAM

## 2023-11-08 PROCEDURE — 87184 SC STD DISK METHOD PER PLATE: CPT

## 2023-11-08 PROCEDURE — 71046 X-RAY EXAM CHEST 2 VIEWS: CPT

## 2023-11-08 PROCEDURE — 99285 EMERGENCY DEPT VISIT HI MDM: CPT

## 2023-11-08 PROCEDURE — 6360000002 HC RX W HCPCS: Performed by: PHYSICIAN ASSISTANT

## 2023-11-08 PROCEDURE — 80053 COMPREHEN METABOLIC PANEL: CPT

## 2023-11-08 PROCEDURE — 82550 ASSAY OF CK (CPK): CPT

## 2023-11-08 PROCEDURE — 96365 THER/PROPH/DIAG IV INF INIT: CPT

## 2023-11-08 PROCEDURE — 87186 SC STD MICRODIL/AGAR DIL: CPT

## 2023-11-08 PROCEDURE — 85025 COMPLETE CBC W/AUTO DIFF WBC: CPT

## 2023-11-08 PROCEDURE — 1200000000 HC SEMI PRIVATE

## 2023-11-08 RX ORDER — POTASSIUM CHLORIDE 7.45 MG/ML
10 INJECTION INTRAVENOUS PRN
Status: DISCONTINUED | OUTPATIENT
Start: 2023-11-08 | End: 2023-11-10 | Stop reason: HOSPADM

## 2023-11-08 RX ORDER — SODIUM CHLORIDE 9 MG/ML
INJECTION, SOLUTION INTRAVENOUS PRN
Status: DISCONTINUED | OUTPATIENT
Start: 2023-11-08 | End: 2023-11-10 | Stop reason: HOSPADM

## 2023-11-08 RX ORDER — ENOXAPARIN SODIUM 100 MG/ML
40 INJECTION SUBCUTANEOUS DAILY
Status: DISCONTINUED | OUTPATIENT
Start: 2023-11-08 | End: 2023-11-10 | Stop reason: HOSPADM

## 2023-11-08 RX ORDER — PROPRANOLOL HCL 60 MG
120 CAPSULE, EXTENDED RELEASE 24HR ORAL DAILY
Status: DISCONTINUED | OUTPATIENT
Start: 2023-11-09 | End: 2023-11-10 | Stop reason: HOSPADM

## 2023-11-08 RX ORDER — POLYETHYLENE GLYCOL 3350 17 G/17G
17 POWDER, FOR SOLUTION ORAL DAILY PRN
Status: DISCONTINUED | OUTPATIENT
Start: 2023-11-08 | End: 2023-11-10 | Stop reason: HOSPADM

## 2023-11-08 RX ORDER — SODIUM CHLORIDE 0.9 % (FLUSH) 0.9 %
5-40 SYRINGE (ML) INJECTION PRN
Status: DISCONTINUED | OUTPATIENT
Start: 2023-11-08 | End: 2023-11-10 | Stop reason: HOSPADM

## 2023-11-08 RX ORDER — MAGNESIUM SULFATE IN WATER 40 MG/ML
2000 INJECTION, SOLUTION INTRAVENOUS PRN
Status: DISCONTINUED | OUTPATIENT
Start: 2023-11-08 | End: 2023-11-10 | Stop reason: HOSPADM

## 2023-11-08 RX ORDER — ACETAMINOPHEN 650 MG/1
650 SUPPOSITORY RECTAL EVERY 6 HOURS PRN
Status: DISCONTINUED | OUTPATIENT
Start: 2023-11-08 | End: 2023-11-10 | Stop reason: HOSPADM

## 2023-11-08 RX ORDER — ONDANSETRON 4 MG/1
4 TABLET, ORALLY DISINTEGRATING ORAL EVERY 8 HOURS PRN
Status: DISCONTINUED | OUTPATIENT
Start: 2023-11-08 | End: 2023-11-10 | Stop reason: HOSPADM

## 2023-11-08 RX ORDER — ACETAMINOPHEN 325 MG/1
650 TABLET ORAL EVERY 6 HOURS PRN
Status: DISCONTINUED | OUTPATIENT
Start: 2023-11-08 | End: 2023-11-10 | Stop reason: HOSPADM

## 2023-11-08 RX ORDER — LANOLIN ALCOHOL/MO/W.PET/CERES
3 CREAM (GRAM) TOPICAL NIGHTLY
Status: DISCONTINUED | OUTPATIENT
Start: 2023-11-08 | End: 2023-11-10 | Stop reason: HOSPADM

## 2023-11-08 RX ORDER — SODIUM CHLORIDE, SODIUM LACTATE, POTASSIUM CHLORIDE, CALCIUM CHLORIDE 600; 310; 30; 20 MG/100ML; MG/100ML; MG/100ML; MG/100ML
INJECTION, SOLUTION INTRAVENOUS CONTINUOUS
Status: DISCONTINUED | OUTPATIENT
Start: 2023-11-08 | End: 2023-11-09

## 2023-11-08 RX ORDER — LISINOPRIL 5 MG/1
2.5 TABLET ORAL DAILY
Status: DISCONTINUED | OUTPATIENT
Start: 2023-11-09 | End: 2023-11-10 | Stop reason: HOSPADM

## 2023-11-08 RX ORDER — POTASSIUM CHLORIDE 20 MEQ/1
40 TABLET, EXTENDED RELEASE ORAL PRN
Status: DISCONTINUED | OUTPATIENT
Start: 2023-11-08 | End: 2023-11-10 | Stop reason: HOSPADM

## 2023-11-08 RX ORDER — ONDANSETRON 2 MG/ML
4 INJECTION INTRAMUSCULAR; INTRAVENOUS EVERY 6 HOURS PRN
Status: DISCONTINUED | OUTPATIENT
Start: 2023-11-08 | End: 2023-11-10 | Stop reason: HOSPADM

## 2023-11-08 RX ORDER — SODIUM CHLORIDE 0.9 % (FLUSH) 0.9 %
5-40 SYRINGE (ML) INJECTION EVERY 12 HOURS SCHEDULED
Status: DISCONTINUED | OUTPATIENT
Start: 2023-11-08 | End: 2023-11-10 | Stop reason: HOSPADM

## 2023-11-08 RX ADMIN — CEFTRIAXONE SODIUM 1000 MG: 1 INJECTION, POWDER, FOR SOLUTION INTRAMUSCULAR; INTRAVENOUS at 11:55

## 2023-11-08 RX ADMIN — APIXABAN 2.5 MG: 2.5 TABLET, FILM COATED ORAL at 21:39

## 2023-11-08 RX ADMIN — SODIUM CHLORIDE, POTASSIUM CHLORIDE, SODIUM LACTATE AND CALCIUM CHLORIDE 1000 ML: 600; 310; 30; 20 INJECTION, SOLUTION INTRAVENOUS at 18:06

## 2023-11-08 RX ADMIN — ENOXAPARIN SODIUM 40 MG: 100 INJECTION SUBCUTANEOUS at 18:07

## 2023-11-08 RX ADMIN — Medication 3 MG: at 21:39

## 2023-11-08 ASSESSMENT — PAIN DESCRIPTION - LOCATION: LOCATION: BACK

## 2023-11-08 ASSESSMENT — PAIN SCALES - GENERAL: PAINLEVEL_OUTOF10: 5

## 2023-11-08 ASSESSMENT — PAIN - FUNCTIONAL ASSESSMENT: PAIN_FUNCTIONAL_ASSESSMENT: 0-10

## 2023-11-08 NOTE — ED PROVIDER NOTES
I independently performed a history and physical on Mariana Charles. All diagnostic, treatment, and disposition decisions were made by myself in conjunction with the advanced practice provider/resident. Labs Reviewed   CBC WITH AUTO DIFFERENTIAL - Abnormal; Notable for the following components:       Result Value    WBC 11.1 (*)     Neutrophils Absolute 8.7 (*)     All other components within normal limits   COMPREHENSIVE METABOLIC PANEL W/ REFLEX TO MG FOR LOW K - Abnormal; Notable for the following components:    Sodium 134 (*)     Chloride 95 (*)     Glucose 109 (*)     Total Bilirubin 2.3 (*)     Alkaline Phosphatase 141 (*)     ALT 96 (*)     AST 92 (*)     All other components within normal limits   TROPONIN - Abnormal; Notable for the following components:    Troponin, High Sensitivity 18 (*)     All other components within normal limits   URINALYSIS WITH REFLEX TO CULTURE - Abnormal; Notable for the following components:    Color, UA DARK YELLOW (*)     Clarity, UA CLOUDY (*)     Bilirubin Urine MODERATE (*)     Ketones, Urine TRACE (*)     Blood, Urine MODERATE (*)     Protein,  (*)     Nitrite, Urine POSITIVE (*)     Leukocyte Esterase, Urine MODERATE (*)     All other components within normal limits   TROPONIN - Abnormal; Notable for the following components:    Troponin, High Sensitivity 17 (*)     All other components within normal limits   MICROSCOPIC URINALYSIS - Abnormal; Notable for the following components:    WBC, UA >100 (*)     RBC, UA 11-20 (*)     Bacteria, UA 3+ (*)     All other components within normal limits   CULTURE, URINE   CK     XR CHEST (2 VW)   Final Result   No acute process. The Ekg interpreted by me shows  atrial fibrillation with a rate of 58, slow ventricular response  Axis is   Normal  QTc is  normal  Intervals and Durations are unremarkable.       ST Segments: nonspecific changes  Atrial fibrillation with slow ventricular response has replaced normal sinus

## 2023-11-08 NOTE — H&P
Hospital Medicine History & Physical      Date of Admission: 11/8/2023    Date of Service:  11/8/2023    [x]Admitted to Inpatient with expected LOS greater than two midnights due to medical therapy. []Placed in Observation status. Chief Admission Complaint: Fall    Presenting Admission History: This is an 72-year-old female with past medical history of hypertension, gout, essential tremors, lower extremity edema who presented to the emergency department after sustaining a fall last night. Patient states when she tried to get out of her couch she suddenly developed and was on the floor. She was very weak and could not bring herself up to the couch. She states that she grabbed a pillow and stayed on the floor overnight until her son came this morning. Patient denies any loss of consciousness. Did not hit her head patient states that she has been having some dysuria urinary frequency as well as worsening weakness for the past 2 to 3 days. Denies any chest pain, palpitations, shortness of breath, cough, abdominal pain, nausea, vomiting, fevers or chills. In the emergency department patient was hemodynamically stable afebrile. Labs revealing mild hyponatremia, LFTs elevated, troponins initially 18 trended down to 17. Leukocytosis at 11. UA positive for urinary tract infection. Chest x-ray with no acute abnormalities. EKG personally reviewed no ST or T wave changes. Assessment/Plan:      #Urinary tract infection  UA positive  Continue Rocephin  IV fluids  Follow-up urine culture/blood culture    #Generalized weakness  #Fall  Likely secondary to UTI  PT/OT  Fall precaution    #AFIB with SVR??  -Difficult to interpret EKG as well as telemetry  due to of patient's tremor.   -Initial EKG without P waves irregular bradycardia heart rate in the high 50s. Unable to find repeat EKG  -Continue to monitor telemetry  -Heart rate on telemetry in the 170s due to tremor.   Actual pulse taken heart rate in the MOUTH EVERY DAY 9/14/23   Ubaldo Levine MD   furosemide (LASIX) 20 MG tablet TAKE 1 TABLET BY MOUTH EVERY DAY  Patient not taking: Reported on 11/8/2023 9/14/23   Ubaldo Levine MD   Cholecalciferol (VITAMIN D) 50 MCG (2000 UT) CAPS capsule Take by mouth    Candy Newton MD   Omega-3 Fatty Acids (FISH OIL PO) Take 1 capsule by mouth daily  Patient not taking: Reported on 11/8/2023    Candy Newton MD   Multiple Vitamins-Minerals (MULTIVITAMIN ADULT PO) Take 1 tablet by mouth daily    Candy Newton MD       Labs: Personally reviewed and interpreted for clinical significance. Recent Labs     11/08/23  1027   WBC 11.1*   HGB 13.3   HCT 39.6        Recent Labs     11/08/23  1027   *   K 3.9   CL 95*   CO2 26   BUN 19   CREATININE 0.9   CALCIUM 9.3     Recent Labs     11/08/23  1027 11/08/23  1124   TROPHS 18* 17*     No results for input(s): \"LABA1C\" in the last 72 hours. Recent Labs     11/08/23  1027   AST 92*   ALT 96*   BILITOT 2.3*   ALKPHOS 141*     No results for input(s): \"INR\", \"LACTA\", \"TSH\" in the last 72 hours.      Trey Iverson MD

## 2023-11-08 NOTE — PROGRESS NOTES
4 Eyes Skin Assessment     NAME:  Alli Young  YOB: 1940  MEDICAL RECORD NUMBER:  [id number]    The patient is being assessed for  Admission    I agree that at least one RN has performed a thorough Head to Toe Skin Assessment on the patient. ALL assessment sites listed below have been assessed. Areas assessed by both nurses:    Head, Face, Ears, Shoulders, Back, Chest, Arms, Elbows, Hands, Sacrum. Buttock, Coccyx, Ischium, and Legs. Feet and Heels        Does the Patient have a Wound?  No noted wound(s)       Marcos Prevention initiated by RN: No  Wound Care Orders initiated by RN: No    Pressure Injury (Stage 3,4, Unstageable, DTI, NWPT, and Complex wounds) if present, place Wound referral order by RN under : No    New Ostomies, if present place, Ostomy referral order under : No     Nurse 1 eSignature: Electronically signed by Kristie Doll RN on 11/8/23 at 6:33 PM EST    **SHARE this note so that the co-signing nurse can place an eSignature**    Nurse 2 eSignature: Electronically signed by Jae Mera RN on 11/8/23 at 6:40 PM EST

## 2023-11-08 NOTE — ED NOTES
Monitor in room showing slow afib rate 38-55. Denies previous cardiac history, no history of afib. Son reports going to see pt at her home this morning - lives alone - to find she got weak and fell down and ended up sleeping on her floor because she could not get up.      Hu Airas RN  11/08/23 0894

## 2023-11-08 NOTE — ED NOTES
Pt resting comfortably. Pt reports being diagnosed with \"benign tremors\" with several members of her family also having it. Artifact on telemetry monitor as a result.      Francisco J Benoit RN  11/08/23 0680

## 2023-11-09 ENCOUNTER — TELEPHONE (OUTPATIENT)
Dept: CARDIOLOGY | Age: 83
End: 2023-11-09

## 2023-11-09 LAB
ALBUMIN SERPL-MCNC: 3.5 G/DL (ref 3.4–5)
ALBUMIN/GLOB SERPL: 1.5 {RATIO} (ref 1.1–2.2)
ALP SERPL-CCNC: 174 U/L (ref 40–129)
ALT SERPL-CCNC: 123 U/L (ref 10–40)
ANION GAP SERPL CALCULATED.3IONS-SCNC: 10 MMOL/L (ref 3–16)
AST SERPL-CCNC: 118 U/L (ref 15–37)
BASOPHILS # BLD: 0 K/UL (ref 0–0.2)
BASOPHILS NFR BLD: 0.4 %
BILIRUB SERPL-MCNC: 3.6 MG/DL (ref 0–1)
BUN SERPL-MCNC: 19 MG/DL (ref 7–20)
CALCIUM SERPL-MCNC: 8.5 MG/DL (ref 8.3–10.6)
CHLORIDE SERPL-SCNC: 95 MMOL/L (ref 99–110)
CO2 SERPL-SCNC: 25 MMOL/L (ref 21–32)
CREAT SERPL-MCNC: 0.7 MG/DL (ref 0.6–1.2)
DEPRECATED RDW RBC AUTO: 14.1 % (ref 12.4–15.4)
EKG ATRIAL RATE: 58 BPM
EKG DIAGNOSIS: NORMAL
EKG Q-T INTERVAL: 472 MS
EKG QRS DURATION: 80 MS
EKG QTC CALCULATION (BAZETT): 463 MS
EKG R AXIS: 79 DEGREES
EKG T AXIS: 49 DEGREES
EKG VENTRICULAR RATE: 58 BPM
EOSINOPHIL # BLD: 0 K/UL (ref 0–0.6)
EOSINOPHIL NFR BLD: 0.3 %
GFR SERPLBLD CREATININE-BSD FMLA CKD-EPI: >60 ML/MIN/{1.73_M2}
GLUCOSE SERPL-MCNC: 144 MG/DL (ref 70–99)
HCT VFR BLD AUTO: 35.3 % (ref 36–48)
HGB BLD-MCNC: 12.2 G/DL (ref 12–16)
LYMPHOCYTES # BLD: 1 K/UL (ref 1–5.1)
LYMPHOCYTES NFR BLD: 12.5 %
MCH RBC QN AUTO: 32.7 PG (ref 26–34)
MCHC RBC AUTO-ENTMCNC: 34.6 G/DL (ref 31–36)
MCV RBC AUTO: 94.5 FL (ref 80–100)
MONOCYTES # BLD: 0.8 K/UL (ref 0–1.3)
MONOCYTES NFR BLD: 10.2 %
NEUTROPHILS # BLD: 6 K/UL (ref 1.7–7.7)
NEUTROPHILS NFR BLD: 76.6 %
PLATELET # BLD AUTO: 125 K/UL (ref 135–450)
PMV BLD AUTO: 9 FL (ref 5–10.5)
POTASSIUM SERPL-SCNC: 3.9 MMOL/L (ref 3.5–5.1)
PROT SERPL-MCNC: 5.9 G/DL (ref 6.4–8.2)
RBC # BLD AUTO: 3.73 M/UL (ref 4–5.2)
SODIUM SERPL-SCNC: 130 MMOL/L (ref 136–145)
WBC # BLD AUTO: 7.8 K/UL (ref 4–11)

## 2023-11-09 PROCEDURE — 93306 TTE W/DOPPLER COMPLETE: CPT

## 2023-11-09 PROCEDURE — 97166 OT EVAL MOD COMPLEX 45 MIN: CPT

## 2023-11-09 PROCEDURE — 97530 THERAPEUTIC ACTIVITIES: CPT

## 2023-11-09 PROCEDURE — 6360000002 HC RX W HCPCS: Performed by: STUDENT IN AN ORGANIZED HEALTH CARE EDUCATION/TRAINING PROGRAM

## 2023-11-09 PROCEDURE — 80053 COMPREHEN METABOLIC PANEL: CPT

## 2023-11-09 PROCEDURE — 97535 SELF CARE MNGMENT TRAINING: CPT

## 2023-11-09 PROCEDURE — 97162 PT EVAL MOD COMPLEX 30 MIN: CPT

## 2023-11-09 PROCEDURE — 97116 GAIT TRAINING THERAPY: CPT

## 2023-11-09 PROCEDURE — 36415 COLL VENOUS BLD VENIPUNCTURE: CPT

## 2023-11-09 PROCEDURE — 93010 ELECTROCARDIOGRAM REPORT: CPT | Performed by: INTERNAL MEDICINE

## 2023-11-09 PROCEDURE — 6370000000 HC RX 637 (ALT 250 FOR IP): Performed by: STUDENT IN AN ORGANIZED HEALTH CARE EDUCATION/TRAINING PROGRAM

## 2023-11-09 PROCEDURE — 2580000003 HC RX 258: Performed by: STUDENT IN AN ORGANIZED HEALTH CARE EDUCATION/TRAINING PROGRAM

## 2023-11-09 PROCEDURE — 85025 COMPLETE CBC W/AUTO DIFF WBC: CPT

## 2023-11-09 PROCEDURE — 1200000000 HC SEMI PRIVATE

## 2023-11-09 PROCEDURE — 99223 1ST HOSP IP/OBS HIGH 75: CPT | Performed by: INTERNAL MEDICINE

## 2023-11-09 RX ADMIN — APIXABAN 2.5 MG: 2.5 TABLET, FILM COATED ORAL at 20:42

## 2023-11-09 RX ADMIN — Medication 3 MG: at 20:41

## 2023-11-09 RX ADMIN — PROPRANOLOL HYDROCHLORIDE 120 MG: 60 CAPSULE, EXTENDED RELEASE ORAL at 09:54

## 2023-11-09 RX ADMIN — ENOXAPARIN SODIUM 40 MG: 100 INJECTION SUBCUTANEOUS at 09:33

## 2023-11-09 RX ADMIN — SODIUM CHLORIDE, POTASSIUM CHLORIDE, SODIUM LACTATE AND CALCIUM CHLORIDE: 600; 310; 30; 20 INJECTION, SOLUTION INTRAVENOUS at 05:35

## 2023-11-09 RX ADMIN — LISINOPRIL 2.5 MG: 2.5 TABLET ORAL at 09:34

## 2023-11-09 RX ADMIN — APIXABAN 2.5 MG: 2.5 TABLET, FILM COATED ORAL at 09:34

## 2023-11-09 RX ADMIN — CEFTRIAXONE SODIUM 1000 MG: 1 INJECTION, POWDER, FOR SOLUTION INTRAMUSCULAR; INTRAVENOUS at 01:49

## 2023-11-09 RX ADMIN — SODIUM CHLORIDE, PRESERVATIVE FREE 10 ML: 5 INJECTION INTRAVENOUS at 10:08

## 2023-11-09 RX ADMIN — SODIUM CHLORIDE, PRESERVATIVE FREE 10 ML: 5 INJECTION INTRAVENOUS at 20:42

## 2023-11-09 NOTE — PROGRESS NOTES
Yes  Overall Level of Assistance: Supervision  Supine to Sit: Supervision  Scooting: Supervision  Balance  Sitting: Intact  Standing: Impaired (SBA for balance)  Standing - Static: Good  Standing - Dynamic: Fair  Transfer Training  Transfer Training: Yes  Overall Level of Assistance: Stand-by assistance  Sit to Stand: Stand-by assistance  Stand to Sit: Stand-by assistance  Toilet Transfer: Stand-by assistance  Tub/Shower Transfer: Stand-by assistance  Gait Training  Gait Training: Yes  Gait  Overall Level of Assistance: Stand-by assistance;Contact-guard assistance  Distance (ft): 10 Feet (+ 150ft (to bathroom, in hallway))  Assistive Device: Gait belt (no AD)  Interventions: Verbal cues  Speed/Irma: Slow  Gait Abnormalities: Decreased step clearance (decreased B foot clearance and B step length, min kyphotic posture. Intermittent CGA for steadying however no overt LOB. Pt does report feeling \"a little weaker\" than baseline with ambulation.)     AROM: Within functional limits  PROM: Within functional limits  Strength: Generally decreased, functional  Coordination: Generally decreased, functional  Tone: Normal  Sensation: Intact  ADL  Feeding: Independent  Feeding Skilled Clinical Factors: at [facility] for breakfast  Grooming: Stand by assistance; Increased time to complete  Grooming Skilled Clinical Factors: standing at sink for 15 min at sink to complete oral hygiene, hand washing, face washing, hair combing; one LOB posteriorly with CGA, able to self correct. Pt required ERNANDEZ assist to open toothpaste and bar of soap d/t decreased fine motor and tremors  LE Dressing: Contact guard assistance  LE Dressing Skilled Clinical Factors: SBA-CGA for donning B socks at EoB with posterior lean requiring CGA at times  Toileting: Stand by assistance; Increased time to complete  Toileting Skilled Clinical Factors: standing to manage pants to/from waist; hygiene in seated     Activity Tolerance  Activity Tolerance: Patient tolerated evaluation without incident        Vision  Vision: Within Functional Limits (\"I need to wear glasses but I haven't gotten my eyes checked\")  Hearing  Hearing: Within functional limits  Orientation  Overall Orientation Status: Within Normal Limits  Orientation Level: Oriented X4                  Education Given To: Patient  Education Provided: Role of Therapy; Energy Conservation;Plan of Care;Transfer Training;ADL Adaptive Strategies; Fall Prevention Strategies  Education Provided Comments: Disease Specific Education: Pt educated on importance of OOB mobility, prevention of complications of bedrest, and general safety during hospitalization including use of call light/RED button for nurse.  Pt verbalized understanding   Education Method: Demonstration;Verbal  Barriers to Learning: None  Education Outcome: Verbalized understanding;Demonstrated understanding  LUE AROM (degrees)  LUE AROM : WFL  Left Hand AROM (degrees)  Left Hand AROM: WFL  RUE AROM (degrees)  RUE AROM : WFL  Right Hand AROM (degrees)  Right Hand AROM: WFL    AM-PAC Score        AM-PAC Inpatient Daily Activity Raw Score: 20 (11/09/23 1539)  AM-PAC Inpatient ADL T-Scale Score : 42.03 (11/09/23 1539)  ADL Inpatient CMS 0-100% Score: 38.32 (11/09/23 1539)  ADL Inpatient CMS G-Code Modifier : CJ (11/09/23 1539)      Goals  Short Term Goals  Time Frame for Short Term Goals: 1 week- 11/16  Short Term Goal 1: Pt will perform LB dressing with mod I  Short Term Goal 2: Pt will perform ADLs at sink with mod I  Short Term Goal 3: Pt will perform toileting/toilet transfer with mod I  Short Term Goal 4: Pt will perform 5 x 20 reps BUE ex to inc strength for ADLs and fxl transfers  Patient Goals   Patient goals : to go home       Therapy Time   Individual Concurrent Group Co-treatment   Time In       0814   Time Out       0855   Minutes       41   Timed Code Treatment Minutes: 31 Minutes (10 min eval)     Co-tx collaboration this date to safely meet goals and will have

## 2023-11-09 NOTE — PLAN OF CARE
Problem: Discharge Planning  Goal: Discharge to home or other facility with appropriate resources  11/9/2023 1202 by Freya Valentine RN  Outcome: Progressing  11/9/2023 0639 by Erendira Glez RN  Outcome: Progressing     Problem: Safety - Adult  Goal: Free from fall injury  11/9/2023 1202 by Freya Valentine RN  Outcome: Progressing  11/9/2023 0639 by Erendira Glez RN  Outcome: Progressing

## 2023-11-09 NOTE — CONSULTS
Electrophysiology Consultation   Date: 11/9/2023  Admit Date:  11/8/2023  Reason for Consultation: Atrial fibrillation with slow ventricular response  Consult Requesting Physician: Minda Hinkle MD     Chief Complaint   Patient presents with    Fall    Fatigue     Patient reports she fell last night around 5pm. Reports she gets light headed and feels like her legs get weak. Pt could not get up from the floor so she slept on the floor all night. Patient denies any pain except she says her back is a little sore from laying on the hard floor all night. HPI:   Mrs. Farhan Jones is a pleasant 82year old female with a medical history significant for hypertension, tremors, heart failure with preserved ejection fraction, and recurrent falls due to lower extremity weakness who presents from home with urinary tract infection and newly diagnosed atrial fibrillation with borderline slow ventricular response. According to patient she was in her usual  state of kely until 11/07/2023 when she attempted to stand from her sofa and fell down due only to lower extremity weakness. She laid on the floor until her son was able to reach her on the phone at which point they insisted she seek medical attention at [facility]. Patient denies tobacco use. She denies EtOH and illicit drug use. She lost her  1 year ago. She has three children. Patient denies fevers, chest pain, orthopnea, PND, lower extremity edema, abdominal swelling, shortness of breath, dyspnea on exertion, chills, visual changes, headaches, sore throat, cough, abdominal pain, nausea, vomiting, bleeding, bruising, dysuria, muscle/joint pain, confusion, depression, anxiety, skin lesions, etc.    Emergency Room/Hospital Course:  Patient presents from home for evaluation on 11/08/2023. Her CMP was notable for elevated liver enzymes. Her CBC was reassuring. Her EKG showed atrial fibrillation with bordering slow ventricular response.     Current independently reviewed the ECG and telemetry. Scheduled Meds:   [START ON 11/10/2023] cefTRIAXone (ROCEPHIN) IV  1,000 mg IntraVENous Q24H    sodium chloride flush  5-40 mL IntraVENous 2 times per day    enoxaparin  40 mg SubCUTAneous Daily    melatonin  3 mg Oral Nightly    lisinopril  2.5 mg Oral Daily    propranolol  120 mg Oral Daily    apixaban  2.5 mg Oral BID     Continuous Infusions:   sodium chloride       PRN Meds:.sodium chloride flush, sodium chloride, potassium chloride **OR** potassium alternative oral replacement **OR** potassium chloride, magnesium sulfate, ondansetron **OR** ondansetron, polyethylene glycol, acetaminophen **OR** acetaminophen     Assessment:   Patient Active Problem List    Diagnosis Date Noted    Closed fracture distal radius and ulna, left, initial encounter 02/08/2023    Dislocation, finger, interphalangeal joint, initial encounter 02/08/2023    UTI (urinary tract infection) 11/08/2023    History of falling 02/21/2023    Personal history of nicotine dependence 02/21/2023    Problems related to living alone 02/21/2023    Prediabetes 06/14/2018    Osteopenia 12/04/2015    Essential hypertension 09/16/2015    Benign familial tremor 06/24/2010    Family history of breast cancer 06/24/2010      Active Hospital Problems    Diagnosis Date Noted    UTI (urinary tract infection) [N39.0] 11/08/2023     Mrs. Benigno Dubin is a pleasant 82year old female with a medical history significant for hypertension, tremors, heart failure with preserved ejection fraction, and recurrent falls due to lower extremity weakness who presents from home with urinary tract infection and newly diagnosed atrial fibrillation with borderline slow ventricular response. Problem List:  1. New onset atrial fibrillation with controlled rates. 2. Heart failure with preserved ejection fraction. 3. Urinary tract infection. 4. Bilateral lower extremity weakness. Assessment and Plan:  1.  New onset atrial fibrillation

## 2023-11-09 NOTE — PROGRESS NOTES
Physical Therapy  Facility/Department: [place] [address] - MED SURG  Physical Therapy Initial Assessment    Name: Golden Huggins  : 1940  MRN: [id number]  Date of Service: 2023    Discharge Recommendations:  Home with assist PRN   PT Equipment Recommendations  Equipment Needed: No      Patient Diagnosis(es): The primary encounter diagnosis was Symptomatic bradycardia. Diagnoses of Acute cystitis with hematuria and General weakness were also pertinent to this visit. Past Medical History:  has a past medical history of Arthritis, Basal cell carcinoma of skin of trunk, Benign familial tremor, Decreased bone density, Hip fracture ([address]), Hypertension, and Right inguinal hernia. Past Surgical History:  has a past surgical history that includes Tonsillectomy (age 13); Tubal ligation; other surgical history (10/2011 approx); Dental surgery (2013); Skin cancer excision (2015); joint replacement (14); hernia repair (Right, 4/10/2019); and Forearm surgery (Left, 2023). Assessment   Body Structures, Functions, Activity Limitations Requiring Skilled Therapeutic Intervention: Decreased functional mobility ; Decreased strength;Decreased endurance;Decreased balance  Assessment: Pt is an 82year old female admitted with generalized weakness, fall at home, and UTI. Pt able to complete bed mobility with supervision, functional transfers with SBA, and ambulate community distance with SBA-CGA without AD. Tolerates standing at sink x 15 mins with SBA for balance. Pt is currently functioning near, however slightly below baseline, as she was IND without AD prior to admission. Pt reports she feels \"a little weaker\" with mobility compared to baseline. At this time, anticipate pt will be safe to return home with PRN assist when medically ready.   Treatment Diagnosis: impaired mobility  Therapy Prognosis: Good  Decision Making: Medium Complexity  Requires PT Follow-Up: Yes  Activity Tolerance  Activity Tolerance: Patient

## 2023-11-09 NOTE — CARE COORDINATION
discharge: N/A            Potential DME:    Patient expects to discharge to: [address] [facility] for transportation at discharge: [facility]: [place] [facility] / Plan: Lupe Banuelos HMO / Product Type: *No Product type* /     Does insurance require precert for SNF: Yes    Potential assistance Purchasing Medications: No  Meds-to-Beds request:        [facility] Agusto Erickson [facility]unch, [address]  [address] [place] [address]  Phone: [phone] Fax: [phone]      Notes:    Factors facilitating achievement of predicted outcomes: Family support, Friend support, Motivated, Cooperative, and Pleasant    Barriers to discharge: Decreased endurance    Additional Case Management Notes: Cm met with pt at bedside. Pt from home alone, IPTA. Does not anticipate any needs at this time. The Plan for Transition of Care is related to the following treatment goals of UTI (urinary tract infection) [N39.0]  General weakness [R53.1]  Symptomatic bradycardia [R00.1]  Acute cystitis with hematuria [B49.42]    IF APPLICABLE: The Patient and/or patient representative Eloise Fothergill and her family were provided with a choice of provider and agrees with the discharge plan. Freedom of choice list with basic dialogue that supports the patient's individualized plan of care/goals and shares the quality data associated with the providers was provided to: Patient   Patient Representative Name:       The Patient and/or Patient Representative Agree with the Discharge Plan?  Yes    Mino Chen RN  Case Management Department  Ph: [phone]

## 2023-11-09 NOTE — CONSULTS
Consult Placed     Who: Alison Mohamud   Date:2023  RBV     Electronically signed by Cody Whitney on 2023 at 7:54 AM

## 2023-11-09 NOTE — PLAN OF CARE
Problem: Discharge Planning  Goal: Discharge to home or other facility with appropriate resources  11/9/2023 0639 by Victorina Seo RN  Outcome: Progressing  11/8/2023 1838 by Cornelio Paagn RN  Outcome: Progressing     Problem: Safety - Adult  Goal: Free from fall injury  11/9/2023 0639 by Victorina Seo RN  Outcome: Progressing  11/8/2023 1838 by Cornelio Pagan RN  Outcome: Progressing

## 2023-11-09 NOTE — TELEPHONE ENCOUNTER
Monitor company [employer]  Length of monitor 14 days  Monitor ordered by  Chris Welsh MD, electrophysiologist   Patch ID [id number]  Device number [id number]  Monitor given to Zoila Jordan RN. Nurse to apply at the time of discharge.

## 2023-11-09 NOTE — TELEPHONE ENCOUNTER
Dr. Jeane Rosas is requesting to see the patient next available also s/p 2 week [employer].   Thanks

## 2023-11-09 NOTE — PROGRESS NOTES
Hospital Medicine Progress Note      Date of Admission: 11/8/2023  Hospital Day: 2    Chief Admission Complaint:  Fall     Subjective:  Sitting on side of bed, eating meal. NAD    Presenting Admission History:       82-year-old female with past medical history of hypertension, gout, essential tremors, lower extremity edema who presented to the emergency department after sustaining a fall last night. Patient states when she tried to get out of her couch she suddenly developed and was on the floor. She was very weak and could not bring herself up to the couch. She states that she grabbed a pillow and stayed on the floor overnight until her son came this morning. Patient denies any loss of consciousness. Did not hit her head patient states that she has been having some dysuria urinary frequency as well as worsening weakness for the past 2 to 3 days. Denies any chest pain, palpitations, shortness of breath, cough, abdominal pain, nausea, vomiting, fevers or chills. In the emergency department patient was hemodynamically stable afebrile. Labs revealing mild hyponatremia, LFTs elevated, troponins initially 18 trended down to 17. Leukocytosis at 11. UA positive for urinary tract infection. Chest x-ray with no acute abnormalities. EKG personally reviewed no ST or T wave changes. Assessment/Plan:      Current Principal Problem:  UTI (urinary tract infection)    Urinary tract infection-POA  UA positive  Continue Rocephin  IV fluids-dc today  Urine cx in process     Generalized weakness, Fall  Likely secondary to UTI  PT/OT  Fall precaution     AFIB with SVR?  -Difficult to interpret EKG as well as telemetry due to of patient's tremor.   -Initial EKG without P waves irregular bradycardia heart rate in the high 50s. Unable to find repeat EKG  -Continue to monitor telemetry  -Start patient on Eliquis 2.5 mg.  -EP evaluation to determine the presence of A-fib or not.   -Monitor telemetry     Elevated

## 2023-11-10 ENCOUNTER — TELEPHONE (OUTPATIENT)
Dept: INTERNAL MEDICINE CLINIC | Age: 83
End: 2023-11-10

## 2023-11-10 ENCOUNTER — PATIENT MESSAGE (OUTPATIENT)
Dept: INTERNAL MEDICINE CLINIC | Age: 83
End: 2023-11-10

## 2023-11-10 VITALS
DIASTOLIC BLOOD PRESSURE: 69 MMHG | SYSTOLIC BLOOD PRESSURE: 124 MMHG | RESPIRATION RATE: 16 BRPM | HEART RATE: 68 BPM | WEIGHT: 124 LBS | OXYGEN SATURATION: 97 % | BODY MASS INDEX: 20.66 KG/M2 | TEMPERATURE: 98.1 F | HEIGHT: 65 IN

## 2023-11-10 DIAGNOSIS — R00.1 SYMPTOMATIC BRADYCARDIA: Primary | ICD-10-CM

## 2023-11-10 LAB
ALBUMIN SERPL-MCNC: 3.1 G/DL (ref 3.4–5)
ALBUMIN/GLOB SERPL: 1.3 {RATIO} (ref 1.1–2.2)
ALP SERPL-CCNC: 150 U/L (ref 40–129)
ALT SERPL-CCNC: 113 U/L (ref 10–40)
ANION GAP SERPL CALCULATED.3IONS-SCNC: 11 MMOL/L (ref 3–16)
AST SERPL-CCNC: 72 U/L (ref 15–37)
BASOPHILS # BLD: 0 K/UL (ref 0–0.2)
BASOPHILS NFR BLD: 0.7 %
BILIRUB SERPL-MCNC: 1.4 MG/DL (ref 0–1)
BUN SERPL-MCNC: 14 MG/DL (ref 7–20)
CALCIUM SERPL-MCNC: 8.4 MG/DL (ref 8.3–10.6)
CHLORIDE SERPL-SCNC: 97 MMOL/L (ref 99–110)
CO2 SERPL-SCNC: 25 MMOL/L (ref 21–32)
CREAT SERPL-MCNC: 0.7 MG/DL (ref 0.6–1.2)
DEPRECATED RDW RBC AUTO: 13.8 % (ref 12.4–15.4)
EOSINOPHIL # BLD: 0.1 K/UL (ref 0–0.6)
EOSINOPHIL NFR BLD: 1 %
GFR SERPLBLD CREATININE-BSD FMLA CKD-EPI: >60 ML/MIN/{1.73_M2}
GLUCOSE SERPL-MCNC: 95 MG/DL (ref 70–99)
HCT VFR BLD AUTO: 35 % (ref 36–48)
HGB BLD-MCNC: 12 G/DL (ref 12–16)
LYMPHOCYTES # BLD: 1.3 K/UL (ref 1–5.1)
LYMPHOCYTES NFR BLD: 19.8 %
MAGNESIUM SERPL-MCNC: 2.1 MG/DL (ref 1.8–2.4)
MCH RBC QN AUTO: 32.7 PG (ref 26–34)
MCHC RBC AUTO-ENTMCNC: 34.4 G/DL (ref 31–36)
MCV RBC AUTO: 95.1 FL (ref 80–100)
MONOCYTES # BLD: 0.8 K/UL (ref 0–1.3)
MONOCYTES NFR BLD: 12 %
NEUTROPHILS # BLD: 4.3 K/UL (ref 1.7–7.7)
NEUTROPHILS NFR BLD: 66.5 %
PLATELET # BLD AUTO: 122 K/UL (ref 135–450)
PMV BLD AUTO: 9.2 FL (ref 5–10.5)
POTASSIUM SERPL-SCNC: 3.5 MMOL/L (ref 3.5–5.1)
PROT SERPL-MCNC: 5.4 G/DL (ref 6.4–8.2)
RBC # BLD AUTO: 3.68 M/UL (ref 4–5.2)
SODIUM SERPL-SCNC: 133 MMOL/L (ref 136–145)
WBC # BLD AUTO: 6.4 K/UL (ref 4–11)

## 2023-11-10 PROCEDURE — 99231 SBSQ HOSP IP/OBS SF/LOW 25: CPT

## 2023-11-10 PROCEDURE — 85025 COMPLETE CBC W/AUTO DIFF WBC: CPT

## 2023-11-10 PROCEDURE — 6360000002 HC RX W HCPCS: Performed by: STUDENT IN AN ORGANIZED HEALTH CARE EDUCATION/TRAINING PROGRAM

## 2023-11-10 PROCEDURE — 6370000000 HC RX 637 (ALT 250 FOR IP): Performed by: STUDENT IN AN ORGANIZED HEALTH CARE EDUCATION/TRAINING PROGRAM

## 2023-11-10 PROCEDURE — 2580000003 HC RX 258: Performed by: NURSE PRACTITIONER

## 2023-11-10 PROCEDURE — 80053 COMPREHEN METABOLIC PANEL: CPT

## 2023-11-10 PROCEDURE — 6360000002 HC RX W HCPCS: Performed by: NURSE PRACTITIONER

## 2023-11-10 PROCEDURE — 36415 COLL VENOUS BLD VENIPUNCTURE: CPT

## 2023-11-10 PROCEDURE — 83735 ASSAY OF MAGNESIUM: CPT

## 2023-11-10 RX ORDER — CEFDINIR 300 MG/1
300 CAPSULE ORAL 2 TIMES DAILY
Qty: 10 CAPSULE | Refills: 0 | Status: SHIPPED | OUTPATIENT
Start: 2023-11-10 | End: 2023-11-15

## 2023-11-10 RX ADMIN — APIXABAN 2.5 MG: 2.5 TABLET, FILM COATED ORAL at 10:24

## 2023-11-10 RX ADMIN — PROPRANOLOL HYDROCHLORIDE 120 MG: 60 CAPSULE, EXTENDED RELEASE ORAL at 15:59

## 2023-11-10 RX ADMIN — CEFTRIAXONE SODIUM 1000 MG: 1 INJECTION, POWDER, FOR SOLUTION INTRAMUSCULAR; INTRAVENOUS at 06:23

## 2023-11-10 RX ADMIN — LISINOPRIL 2.5 MG: 2.5 TABLET ORAL at 10:24

## 2023-11-10 RX ADMIN — ENOXAPARIN SODIUM 40 MG: 100 INJECTION SUBCUTANEOUS at 10:24

## 2023-11-10 NOTE — TELEPHONE ENCOUNTER
Care Transitions Initial Follow Up Call    Outreach made within 2 business days of discharge: Yes    Patient: Femi Pelletier Patient : 1940   MRN: [id number]  Reason for Admission: There are no discharge diagnoses documented for the most recent discharge. Discharge Date: 23       Spoke with: CHASE to [facility].  Follow up    Discharge department/facility: [facility]    Scheduled appointment with PCP within 7-14 days    Follow Up  Future Appointments   Date Time Provider [address] [place] [address]   2023  9:30 AM Lady Campoverde MD [address]

## 2023-11-10 NOTE — TELEPHONE ENCOUNTER
Appointment changed to Dr. Andree Houston. He requested the appointment not Dr. Corey Martinez.   Thanks

## 2023-11-10 NOTE — TELEPHONE ENCOUNTER
From: Keila Orosco  To: Dr. Armendariz Peer: 11/10/2023 8:29 AM EST  Subject: Medications for Feli Nieves Dr. Mindi Moran,  This is Stephania's daughter. I have a question about the prescription Propanolol. I read that bradycardia is a side effect of that medication and we had discussed whether or not it was really helping her tremor in the past. I will also ask the cardiologist, but please let us know if you think she should wean off of the medication to see if things improve. She has told me several times that on certain days she feels unstable and her legs won't work. She has had several falls over the past couple years and one time her legs just gave out and she leaned against the wall and slid down so she didn't get injured. Thank you in advance for your medical opinion. She sees the cardiologist on December 4. DISPLAY PLAN FREE TEXT

## 2023-11-10 NOTE — PROGRESS NOTES
Hospital Medicine Progress Note      Date of Admission: 11/8/2023  Hospital Day: 3    Chief Admission Complaint:  Fall     Subjective:  Sitting on side of bed, eating meal. NAD    Presenting Admission History:       82-year-old female with past medical history of hypertension, gout, essential tremors, lower extremity edema who presented to the emergency department after sustaining a fall last night. Patient states when she tried to get out of her couch she suddenly developed and was on the floor. She was very weak and could not bring herself up to the couch. She states that she grabbed a pillow and stayed on the floor overnight until her son came this morning. Patient denies any loss of consciousness. Did not hit her head patient states that she has been having some dysuria urinary frequency as well as worsening weakness for the past 2 to 3 days. Denies any chest pain, palpitations, shortness of breath, cough, abdominal pain, nausea, vomiting, fevers or chills. In the emergency department patient was hemodynamically stable afebrile. Labs revealing mild hyponatremia, LFTs elevated, troponins initially 18 trended down to 17. Leukocytosis at 11. UA positive for urinary tract infection. Chest x-ray with no acute abnormalities. EKG personally reviewed no ST or T wave changes. Assessment/Plan:      Current Principal Problem:  UTI (urinary tract infection)    Urinary tract infection-POA  UA positive  Continue Rocephin  IV fluids-dc today  Urine cx klebsiella, sensitivities pending     Generalized weakness, Fall  Likely secondary to UTI  PT/OT  Fall precaution     AFIB with CVR  -Difficult to interpret EKG as well as telemetry due to of patient's tremor.   -Initial EKG without P waves irregular bradycardia heart rate in the high 50s.   Unable to find repeat EKG  -Continue to monitor telemetry  -Start patient on Eliquis 2.5 mg.  -EP consulted  -Monitor telemetry     Elevated LFTs-right upper quadrant

## 2023-11-10 NOTE — PROGRESS NOTES
Pt ok for discharge per MD. Discharge instructions given. IV removed. Telemetry monitor removed and CMU notified. Cardiac event monitor placed on patient and instructions given. Medications picked up from outpatient pharmacy. No questions or concerns at this time. Transported by wheelchair to personal car with all belongings.

## 2023-11-11 LAB
BACTERIA UR CULT: ABNORMAL
BACTERIA UR CULT: ABNORMAL
ORGANISM: ABNORMAL

## 2023-11-12 DIAGNOSIS — R31.9 URINARY TRACT INFECTION WITH HEMATURIA, SITE UNSPECIFIED: Primary | ICD-10-CM

## 2023-11-12 DIAGNOSIS — N39.0 URINARY TRACT INFECTION WITH HEMATURIA, SITE UNSPECIFIED: Primary | ICD-10-CM

## 2023-11-12 RX ORDER — CIPROFLOXACIN 500 MG/1
500 TABLET, FILM COATED ORAL 2 TIMES DAILY
Qty: 10 TABLET | Refills: 0 | Status: SHIPPED | OUTPATIENT
Start: 2023-11-12 | End: 2023-11-17

## 2023-11-13 ENCOUNTER — CARE COORDINATION (OUTPATIENT)
Dept: CASE MANAGEMENT | Age: 83
End: 2023-11-13

## 2023-11-13 DIAGNOSIS — I10 ESSENTIAL HYPERTENSION: Primary | Chronic | ICD-10-CM

## 2023-11-13 PROCEDURE — 1111F DSCHRG MED/CURRENT MED MERGE: CPT | Performed by: STUDENT IN AN ORGANIZED HEALTH CARE EDUCATION/TRAINING PROGRAM

## 2023-11-13 NOTE — DISCHARGE INSTR - COC
Continuity of Care Form    Patient Name: Rufus Nava   :  1940  MRN:  [id number]    Admit date:  2023  Discharge date:  ***    Code Status Order: Prior   Advance Directives:     Admitting Physician:   Jeri Clark MD  PCP: Chau Rey MD    Discharging Nurse: [facility] Unit/Room#: 0105/0105-01  Discharging Unit Phone Number: ***    Emergency Contact:   Extended Emergency Contact Information  Primary Emergency Contact: Marii Patel  Address: [address] [place] [address] of [address] [employer] Phone: [phone]  Mobile Phone: [phone]  Relation: Spouse  Secondary Emergency Contact: Lex Sawyer  Address: [address] [place] [address] of [address] [employer] Phone: [phone]  Mobile Phone: [phone]  Relation: Child    Past Surgical History:  Past Surgical History:   Procedure Laterality Date    DENTAL SURGERY  2013    FOREARM SURGERY Left 2023    OPEN REDUCTION AND INTERNAL FIXATION LEFT DISTAL RADIUS FRACTURE performed by Sunitha Mann MD at [facility] Right 4/10/2019    [address] [place] [address] RIGHT INGUINAL HERNIA REPAIR performed by Sanjuanita Traore MD at [address] [place] [address]  12    right bipolar hip replacement    OTHER SURGICAL HISTORY  10/2011 approx    mole removal from back    SKIN CANCER EXCISION  2015    squamous cell skin cancer    TONSILLECTOMY  age 13    TUBAL LIGATION      d & c done simultaneously to remove IUD       Immunization History:   Immunization History   Administered Date(s) Administered    COVID-19, J&J, (age 18y+), IM, 0.5 mL 2021, 02/15/2022    Influenza Vaccine, unspecified formulation 2016, 10/01/2017    Influenza Virus Vaccine 2012, 2013, 10/14/2015    Influenza, FLUAD, (age 72 y+), Adjuvanted, 0.5mL 2020, 2022    Influenza, High Dose (Fluzone 65 yrs and older) 09/10/2014, 10/29/2018    Influenza, Triv, inactivated,

## 2023-11-14 ENCOUNTER — TELEPHONE (OUTPATIENT)
Dept: INTERNAL MEDICINE CLINIC | Age: 83
End: 2023-11-14

## 2023-11-14 NOTE — TELEPHONE ENCOUNTER
----- Message from Kelsey Sánchez MD sent at 11/10/2023  2:37 PM EST -----  Regarding: RE: Medications for Yenifer Beth  Contact: [phone]  Please schedule hospital follow-up post discharge and we can discuss all about this in her visit until then I would advise her to continue taking the propranolol  ----- Message -----  From: [place] Benedict MA  Sent: 11/10/2023   2:26 PM EST  To: Kelsey Sánchez MD  Subject: FW: Medications for Yenifer Beth                    ----- Message -----  From: Flaca Tse \"Tamiko\"  Sent: 11/10/2023   8:29 AM EST  To: [facility] [id number] Staff  Subject: Medications for Dickson Christopher,  This is Stephania's daughter. I have a question about the prescription Propanolol. I read that bradycardia is a side effect of that medication and we had discussed whether or not it was really helping her tremor in the past. I will also ask the cardiologist, but please let us know if you think she should wean off of the medication to see if things improve. She has told me several times that on certain days she feels unstable and her legs won't work. She has had several falls over the past couple years and one time her legs just gave out and she leaned against the wall and slid down so she didn't get injured. Thank you in advance for your medical opinion. She sees the cardiologist on December 4.

## 2023-11-14 NOTE — TELEPHONE ENCOUNTER
Suggest elevation of the extremities to promote venous return.  Discuss with PCP during upcoming appointment

## 2023-11-14 NOTE — TELEPHONE ENCOUNTER
Care Transitions Initial Follow Up Call    Outreach made within 2 business days of discharge: Yes    Patient: Mariana Guerrero Patient : 1940   MRN: [id number]  Reason for Admission: There are no discharge diagnoses documented for the most recent discharge. Discharge Date: 11/10/23       Spoke with: Vivek Martinez    Discharge department/facility: [facility] Interactive Patient Contact:  Was patient able to fill all prescriptions: Yes  Was patient instructed to bring all medications to the follow-up visit: Yes  Is patient taking all medications as directed in the discharge summary?  Yes  Does patient understand their discharge instructions: Yes  Does patient have questions or concerns that need addressed prior to 7-14 day follow up office visit: yes - Yes    Scheduled appointment with PCP within 7-14 days    Follow Up  Future Appointments   Date Time Provider [address] [place] [address]   2023  3:20 PM MD CRIS Mckeon AND KARLA BAIRD   2023  9:30 AM MD Driss Lorenzana [address] [place] [address]

## 2023-11-16 ENCOUNTER — CARE COORDINATION (OUTPATIENT)
Dept: CARE COORDINATION | Age: 83
End: 2023-11-16

## 2023-11-16 NOTE — CARE COORDINATION
Care Transitions Follow Up Call    Patient Current Location:  Home: [address] [place] [address]    LPN Care Coordinator contacted the patient by telephone to follow up after admission on -11/10. Verified name and  with patient as identifiers. Patient: Claudette Isaac  Patient : 1940   MRN: [id number]  Reason for Admission: UTI/symptomatic bradycardia  Discharge Date: 11/10/23 RARS: Readmission Risk Score: 10.5      Needs to be reviewed by the provider   Additional needs identified to be addressed with provider: No  none             Method of communication with provider: none. LPN CC spoke with patient. States she is doing pretty good. Swelling improving. (R) ankle is worse. LLE is almost back to baseline. Compression stockings in place & has been elevating. Denies fever/chills, N/V/D, dysuria, hematuria, CP, palpitations, dizziness. She has been doing well with ADLs, had just come in from trimming bushes in her yard. Daughter transports to [employer]. Appetite good. Denies problems with bowels or bladder. Denies medication changes. Denies needs. PCP f/u tomorrow. Jonas Saldana LPN CC  Care Transitions  [phone]      Addressed changes since last contact:  none  Discussed follow-up appointments. If no appointment was previously scheduled, appointment scheduling offered: Yes. Is follow up appointment scheduled within 7 days of discharge? Yes. Follow Up  Future Appointments   Date Time Provider [address]   2023  3:20 PM MD CRIS Sawant AND KARLA BAIRD   2023  9:30 AM MD Jeyson Love     External follow up appointment(s): [facility]    LPN Care Coordinator reviewed medical action plan and red flags with patient and discussed any barriers to care and/or understanding of plan of care after discharge.  Discussed appropriate site of care based on symptoms and resources available to patient including: PCP  Specialist  Urgent care

## 2023-11-17 ENCOUNTER — OFFICE VISIT (OUTPATIENT)
Dept: INTERNAL MEDICINE CLINIC | Age: 83
End: 2023-11-17

## 2023-11-17 VITALS
WEIGHT: 124.4 LBS | TEMPERATURE: 97.7 F | OXYGEN SATURATION: 91 % | SYSTOLIC BLOOD PRESSURE: 118 MMHG | BODY MASS INDEX: 20.7 KG/M2 | DIASTOLIC BLOOD PRESSURE: 80 MMHG | HEART RATE: 78 BPM

## 2023-11-17 DIAGNOSIS — N30.00 ACUTE CYSTITIS WITHOUT HEMATURIA: ICD-10-CM

## 2023-11-17 DIAGNOSIS — I48.0 PAROXYSMAL ATRIAL FIBRILLATION (HCC): ICD-10-CM

## 2023-11-17 DIAGNOSIS — K80.20 CALCULUS OF GALLBLADDER WITHOUT CHOLECYSTITIS WITHOUT OBSTRUCTION: ICD-10-CM

## 2023-11-17 DIAGNOSIS — Z09 HOSPITAL DISCHARGE FOLLOW-UP: Primary | ICD-10-CM

## 2023-11-17 NOTE — PROGRESS NOTES
Post-Discharge Transitional Care  Follow Up      Monserrat Roman   YOB: 1940    Date of Office Visit:  11/17/2023  Date of Hospital Admission: 11/8/23  Date of Hospital Discharge: 11/10/23  Risk of hospital readmission (high >=14%. Medium >=10%) :Readmission Risk Score: 10.5      Care management risk score Rising risk (score 2-5) and Complex Care (Scores >=6): No Risk Score On File     Non face to face  following discharge, date last encounter closed (first attempt may have been earlier): 11/14/2023    Call initiated 2 business days of discharge: Yes    ASSESSMENT/PLAN:   Hospital discharge follow-up  -     DE DISCHARGE MEDS RECONCILED W/ CURRENT OUTPATIENT MED LIST  Acute cystitis without hematuria  -Symptoms have resolved. Completing course of antibiotic today. Paroxysmal atrial fibrillation (HCC)  -Continue on Eliquis, propranolol. Patient has an upcoming follow-up with cardiology. Currently wearing a heart monitor. No bleeding events noted    Calculus of gallbladder without cholecystitis without obstruction  -Asymptomatic at this time. Noted tendinitis which was resolving. Reviewed gallbladder ultrasound cholelithiasis without any evidence of cholecystitis. No intrahepatic biliary duct dilatation noted. Medical Decision Making: high complexity  No follow-ups on file. Subjective:   HPI:  Follow up of Hospital problems/diagnosis(es):    Inpatient course: Discharge summary reviewed- see chart. Interval history/Current status:Patient is a 72-year-old female presenting for follow-up posthospitalization. She was admitted on 11/8/2023 and discharged on 11/10/2023 status post fall.   She is found to have A-fib with controlled ventricular rate, she was started on Eliquis and discharged with a cardiac event monitor, she did have elevated LFTs with right upper quadrant ultrasound showing cholelithiasis did not have any symptoms, was seen by cardiology with recommendations to

## 2023-11-20 ENCOUNTER — TELEPHONE (OUTPATIENT)
Dept: CARDIOLOGY CLINIC | Age: 83
End: 2023-11-20

## 2023-11-22 ENCOUNTER — CARE COORDINATION (OUTPATIENT)
Dept: CASE MANAGEMENT | Age: 83
End: 2023-11-22

## 2023-11-22 NOTE — TELEPHONE ENCOUNTER
Judge Treviño, APRN - CNP  You 2 days ago     KK  It looks like this is occurring at night with sleep. Pause was 3.0 seconds. Continue to monitor. May need sleep apnea evaluation at some point. Attempted to reach patient. No answer or option for VM.

## 2023-12-01 ENCOUNTER — CARE COORDINATION (OUTPATIENT)
Dept: CASE MANAGEMENT | Age: 83
End: 2023-12-01

## 2023-12-01 NOTE — CARE COORDINATION
Care Transitions Follow Up Call    Patient: Rebeca Martinez  Patient : 1940   MRN: [id number]  Reason for Admission: UTI/symptomatic bradycardia  Discharge Date: 11/10/23 RARS: Readmission Risk Score: 10.5    Attempted to reach patient via phone for transition call. VM left stating purpose of call along with my contact information requesting a return call.           Follow Up  Future Appointments   Date Time Provider [address] [place] [address]   2023  9:30 AM MD Kimberly Armstrong   2024 11:20 AM Edvin Chou MD AND [facility]      Care Transitions Subsequent and Final Call    Subsequent and Final Calls  Care Transitions Interventions                          Other Interventions:           Montse Solis RN

## 2023-12-04 ENCOUNTER — TELEPHONE (OUTPATIENT)
Dept: CARDIOLOGY CLINIC | Age: 83
End: 2023-12-04

## 2023-12-04 NOTE — TELEPHONE ENCOUNTER
Spoke with patient regarding monitor results. 100% AF burden with pauses up to 3.2 seconds. She does not report being symptomatic with pauses and feels well. Will keep appointment with ANICETO as scheduled on 12/19/23.    ANICETO SLOAN

## 2023-12-06 DIAGNOSIS — G25.0 BENIGN FAMILIAL TREMOR: Chronic | ICD-10-CM

## 2023-12-06 NOTE — TELEPHONE ENCOUNTER
Refill request for Eliquis medication.      [facility]       Last visit - 11/17/23     Pending visit - none    Last refill -11/10/23      Medication Contract signed -   Last Oarrs ran-         Additional Comments

## 2023-12-07 ENCOUNTER — CARE COORDINATION (OUTPATIENT)
Dept: CASE MANAGEMENT | Age: 83
End: 2023-12-07

## 2023-12-08 PROBLEM — N39.0 UTI (URINARY TRACT INFECTION): Status: RESOLVED | Noted: 2023-11-08 | Resolved: 2023-12-08

## 2023-12-14 ENCOUNTER — CARE COORDINATION (OUTPATIENT)
Dept: CASE MANAGEMENT | Age: 83
End: 2023-12-14

## 2023-12-19 PROBLEM — I48.91 ATRIAL FIBRILLATION (HCC): Status: ACTIVE | Noted: 2023-12-19

## 2023-12-20 PROBLEM — S52.501A CLOSED FRACTURE OF RIGHT DISTAL RADIUS: Status: ACTIVE | Noted: 2023-12-20

## 2024-01-02 ENCOUNTER — TELEPHONE (OUTPATIENT)
Dept: ORTHOPEDIC SURGERY | Age: 84
End: 2024-01-02

## 2024-01-02 NOTE — TELEPHONE ENCOUNTER
Surgery and/or Procedure Scheduling     Contact Name: Stephania Sims DOLORES \"Tamiko\"  Surgical/Procedure Request: sx on rt arm on 12/21/23  Patient Contact Number: [phone]    Patient would like to know if she can be seen on Friday because her daughter will not be able to bring her on 01/04/24 due to having conflict with her job so she only has this Friday off and her mother is coming for her first post-op visit. She just need a call back. Please Advise.

## 2024-01-02 NOTE — TELEPHONE ENCOUNTER
Spoke with patient's daughter. She stated that she has a ride for 1/4/24 and would like to leave the appointment for now.

## 2024-01-04 ENCOUNTER — OFFICE VISIT (OUTPATIENT)
Dept: ORTHOPEDIC SURGERY | Age: 84
End: 2024-01-04

## 2024-01-04 VITALS — BODY MASS INDEX: 20.99 KG/M2 | HEIGHT: 65 IN | WEIGHT: 126 LBS

## 2024-01-04 DIAGNOSIS — S52.602A CLOSED FRACTURE DISTAL RADIUS AND ULNA, LEFT, INITIAL ENCOUNTER: Primary | ICD-10-CM

## 2024-01-04 DIAGNOSIS — S52.502A CLOSED FRACTURE DISTAL RADIUS AND ULNA, LEFT, INITIAL ENCOUNTER: Primary | ICD-10-CM

## 2024-01-05 NOTE — PROGRESS NOTES
/ rigid orthosis to improve their function.  The orthosis will assist in protecting the affected area, provide functional support and facilitate healing.    The patient was educated and fit by a healthcare professional with expert knowledge and specialization in brace application while under the direct supervision of the treating physician.  Verbal and written instructions for the use of and application of this item were provided.   They were instructed to contact the office immediately should the brace result in increased pain, decreased sensation, increased swelling or worsening of the condition.         Rosalinda Elliott, APRN - CNP

## 2024-01-09 RX ORDER — APIXABAN 2.5 MG/1
2.5 TABLET, FILM COATED ORAL 2 TIMES DAILY
Qty: 60 TABLET | Refills: 0 | Status: SHIPPED | OUTPATIENT
Start: 2024-01-09

## 2024-01-09 NOTE — TELEPHONE ENCOUNTER
Refill request for  medication.     ELIQUIS 2.5 MG     Name of [facility]- [facility]       Last visit - 11/17/2023     Pending visit - NONE     Last refill -12/06/2023      PDMP Monitoring:    Last PDMP Skyler as Reviewed:  Review User Review Instant Review Result   JONATHAN HOLLIDAY 12/20/2023  6:34 PM Reviewed PDMP [1]     [unfilled]  Urine Drug Screenings (1 yr)    No resulted procedures found.       Medication Contract and Consent for Opioid Use Documents Filed        No documents found                         Additional Comments

## 2024-02-05 RX ORDER — APIXABAN 2.5 MG/1
2.5 TABLET, FILM COATED ORAL 2 TIMES DAILY
Qty: 60 TABLET | Refills: 0 | Status: SHIPPED | OUTPATIENT
Start: 2024-02-05 | End: 2024-02-07 | Stop reason: SDUPTHER

## 2024-02-05 NOTE — TELEPHONE ENCOUNTER
Refill request for Eliquis Oral Tablet 2.5 MG  medication.     Name of [facility]- [facility]      Last visit - 11-     Pending visit - N/A    Last refill - 1-9-2024      Medication Contract signed -   Last Oarrs ran-         Additional Comments

## 2024-02-07 RX ORDER — APIXABAN 2.5 MG/1
2.5 TABLET, FILM COATED ORAL 2 TIMES DAILY
Qty: 60 TABLET | Refills: 0 | OUTPATIENT
Start: 2024-02-07

## 2024-02-07 NOTE — TELEPHONE ENCOUNTER
Additional Comments     Patient's son Ramo Siddiqi) is calling and states that she is needing a 90 Day.     States the [facility] didn't receive the prescription.         Refill request for ELIQUIS medication.     Name of [facility]- [facility]      Last visit - 12-     Pending visit - 6-7-2024    Last refill - 1-5-2023      Medication Contract signed -   Last Oarrs ran-

## 2024-02-20 ENCOUNTER — OFFICE VISIT (OUTPATIENT)
Dept: ORTHOPEDIC SURGERY | Age: 84
End: 2024-02-20

## 2024-02-20 VITALS — HEIGHT: 65 IN | BODY MASS INDEX: 20.99 KG/M2 | WEIGHT: 126 LBS

## 2024-02-20 DIAGNOSIS — S52.502A CLOSED FRACTURE DISTAL RADIUS AND ULNA, LEFT, INITIAL ENCOUNTER: Primary | ICD-10-CM

## 2024-02-20 DIAGNOSIS — S52.602A CLOSED FRACTURE DISTAL RADIUS AND ULNA, LEFT, INITIAL ENCOUNTER: Primary | ICD-10-CM

## 2024-02-20 PROCEDURE — APPNB15 APP NON BILLABLE TIME 0-15 MINS: Performed by: NURSE PRACTITIONER

## 2024-02-20 PROCEDURE — 99024 POSTOP FOLLOW-UP VISIT: CPT | Performed by: NURSE PRACTITIONER

## 2024-02-20 NOTE — PROGRESS NOTES
DIAGNOSIS:  Right distal radius 3 parts intra articular displaced fracture, status post ORIF.    DATE OF SURGERY:  12/21/2023.    HISTORY OF PRESENT ILLNESS:  Ms. Sims 83 y.o.  female right handed returns today  for 8 weeks postoperative visit.  The patient denies any significant pain in the right wrist.  Rates pain a 1/10 VAS mild, aching, intermittent and are improving. Aggravating factors increased use. Alleviating factors rest.  No numbness or tingling sensation. No fever or Chills. She  is in a brace.     PHYSICAL EXAMINATION:  The incision is completely healed .  No signs of any erythema or drainage. She  has no pain with the active or passive range of motion of the right wrist, but decrease ROM.  She  has intact sensation, distally, and she  is neurovascularly intact.    IMAGING:  Three views right wrist taken today in the office showed anatomic alignment of right distal radius, plate and screws in good position, no loosening.      IMPRESSION:  8 weeks out from right distal radius ORIF and doing very well.    PLAN:  I have told the patient to work on ROM, as well as strengthening exercises. She would like to do it on her own.  She can discontinue the forearm brace. No heavy impact activities, but can gradually increase her activities as tolerated. The patient will come back for a follow up in 6 weeks.  At that time, we will take 3 views of the right wrist.    As this patient has demonstrated risk factors for osteoporosis, such as age greater than fifty years and evidence of a fracture, I have referred the patient back to the primary care physician for evaluation for osteoporosis, including consideration for DEXA scanning, if this is felt to be clinically indicated.  The patient is advised to contact the primary care physician to follow-up for further evaluation.         Rosalinda Elliott, APRN - CNP

## 2024-03-14 DIAGNOSIS — I10 ESSENTIAL HYPERTENSION: ICD-10-CM

## 2024-03-14 RX ORDER — LISINOPRIL 2.5 MG/1
TABLET ORAL
Qty: 90 TABLET | Refills: 0 | Status: SHIPPED | OUTPATIENT
Start: 2024-03-14

## 2024-03-14 NOTE — TELEPHONE ENCOUNTER
Refill request for LISINOPRIL medication.     Name of Pharmacy- [facility]      Last visit - 11/17/23     Pending visit - 6/7/24    Last refill -12/19/23      Medication Contract signed -   Last Oarrs ran-         Additional Comments

## 2024-06-18 DIAGNOSIS — I10 ESSENTIAL HYPERTENSION: ICD-10-CM

## 2024-06-18 RX ORDER — LISINOPRIL 2.5 MG/1
TABLET ORAL
Qty: 90 TABLET | Refills: 0 | Status: SHIPPED | OUTPATIENT
Start: 2024-06-18

## 2024-06-18 NOTE — TELEPHONE ENCOUNTER
Refill request for  medication.     LISINOPRIL 2.5 MG     Name of Pharmacy- [facility]       Last visit - 11/17/2023     Pending visit - 6/28/2024    Last refill -3/14/2024            Additional Comments

## 2024-06-28 ENCOUNTER — OFFICE VISIT (OUTPATIENT)
Dept: INTERNAL MEDICINE CLINIC | Age: 84
End: 2024-06-28
Payer: MEDICARE

## 2024-06-28 VITALS
OXYGEN SATURATION: 98 % | SYSTOLIC BLOOD PRESSURE: 128 MMHG | HEART RATE: 60 BPM | WEIGHT: 125.2 LBS | HEIGHT: 65 IN | BODY MASS INDEX: 20.86 KG/M2 | DIASTOLIC BLOOD PRESSURE: 82 MMHG | TEMPERATURE: 97.4 F

## 2024-06-28 DIAGNOSIS — I48.91 ATRIAL FIBRILLATION, UNSPECIFIED TYPE (HCC): ICD-10-CM

## 2024-06-28 DIAGNOSIS — G25.0 BENIGN FAMILIAL TREMOR: ICD-10-CM

## 2024-06-28 DIAGNOSIS — Z91.81 AT HIGH RISK FOR FALLS: ICD-10-CM

## 2024-06-28 DIAGNOSIS — I10 ESSENTIAL HYPERTENSION: Primary | ICD-10-CM

## 2024-06-28 DIAGNOSIS — R73.03 PREDIABETES: ICD-10-CM

## 2024-06-28 DIAGNOSIS — M85.89 OSTEOPENIA OF MULTIPLE SITES: ICD-10-CM

## 2024-06-28 DIAGNOSIS — I27.20 MILD PULMONARY HYPERTENSION (HCC): ICD-10-CM

## 2024-06-28 PROBLEM — D69.6 THROMBOCYTOPENIA, UNSPECIFIED (HCC): Status: ACTIVE | Noted: 2024-06-28

## 2024-06-28 PROCEDURE — G2211 COMPLEX E/M VISIT ADD ON: HCPCS | Performed by: STUDENT IN AN ORGANIZED HEALTH CARE EDUCATION/TRAINING PROGRAM

## 2024-06-28 PROCEDURE — 1090F PRES/ABSN URINE INCON ASSESS: CPT | Performed by: STUDENT IN AN ORGANIZED HEALTH CARE EDUCATION/TRAINING PROGRAM

## 2024-06-28 PROCEDURE — G8399 PT W/DXA RESULTS DOCUMENT: HCPCS | Performed by: STUDENT IN AN ORGANIZED HEALTH CARE EDUCATION/TRAINING PROGRAM

## 2024-06-28 PROCEDURE — 3074F SYST BP LT 130 MM HG: CPT | Performed by: STUDENT IN AN ORGANIZED HEALTH CARE EDUCATION/TRAINING PROGRAM

## 2024-06-28 PROCEDURE — 99214 OFFICE O/P EST MOD 30 MIN: CPT | Performed by: STUDENT IN AN ORGANIZED HEALTH CARE EDUCATION/TRAINING PROGRAM

## 2024-06-28 PROCEDURE — G8420 CALC BMI NORM PARAMETERS: HCPCS | Performed by: STUDENT IN AN ORGANIZED HEALTH CARE EDUCATION/TRAINING PROGRAM

## 2024-06-28 PROCEDURE — 1123F ACP DISCUSS/DSCN MKR DOCD: CPT | Performed by: STUDENT IN AN ORGANIZED HEALTH CARE EDUCATION/TRAINING PROGRAM

## 2024-06-28 PROCEDURE — G8427 DOCREV CUR MEDS BY ELIG CLIN: HCPCS | Performed by: STUDENT IN AN ORGANIZED HEALTH CARE EDUCATION/TRAINING PROGRAM

## 2024-06-28 PROCEDURE — 3079F DIAST BP 80-89 MM HG: CPT | Performed by: STUDENT IN AN ORGANIZED HEALTH CARE EDUCATION/TRAINING PROGRAM

## 2024-06-28 PROCEDURE — 1036F TOBACCO NON-USER: CPT | Performed by: STUDENT IN AN ORGANIZED HEALTH CARE EDUCATION/TRAINING PROGRAM

## 2024-06-28 SDOH — ECONOMIC STABILITY: FOOD INSECURITY: WITHIN THE PAST 12 MONTHS, YOU WORRIED THAT YOUR FOOD WOULD RUN OUT BEFORE YOU GOT MONEY TO BUY MORE.: NEVER TRUE

## 2024-06-28 SDOH — ECONOMIC STABILITY: INCOME INSECURITY: HOW HARD IS IT FOR YOU TO PAY FOR THE VERY BASICS LIKE FOOD, HOUSING, MEDICAL CARE, AND HEATING?: NOT HARD AT ALL

## 2024-06-28 SDOH — ECONOMIC STABILITY: FOOD INSECURITY: WITHIN THE PAST 12 MONTHS, THE FOOD YOU BOUGHT JUST DIDN'T LAST AND YOU DIDN'T HAVE MONEY TO GET MORE.: NEVER TRUE

## 2024-06-28 ASSESSMENT — PATIENT HEALTH QUESTIONNAIRE - PHQ9
1. LITTLE INTEREST OR PLEASURE IN DOING THINGS: NOT AT ALL
SUM OF ALL RESPONSES TO PHQ QUESTIONS 1-9: 0
2. FEELING DOWN, DEPRESSED OR HOPELESS: NOT AT ALL
SUM OF ALL RESPONSES TO PHQ9 QUESTIONS 1 & 2: 0
SUM OF ALL RESPONSES TO PHQ QUESTIONS 1-9: 0

## 2024-06-28 NOTE — PROGRESS NOTES
extremities and does not think propranolol has made a difference.  She has tried multiple other medications in the past and has failed  2/17/2023 DEXA scan osteopenia  ROS    CONSTITUTIONAL:  No fevers, chills, sweats or weight changes  EYES:  No redness or visual symptoms.  EARS, NOSE AND THROAT:  No difficulties with hearing.  No symptoms of rhinitis or sore throat.  CARDIOVASCULAR:  No chest pains, palpitations, orthopnea or paroxysmal noctunal dyspnea.  RESPIRATORY:  No dyspnea o exertion, wheezing or cough.  GI:  No nausea, vomiting, diarrhea, constipation, abdominal pain, hematochezia or melena.    :  No dysuria, urinary hesitancy or dribbling.  No nocturia or urinary frequency.  No abnormal urethral  discharge.  MUSCULOSKELETAL:  No muscle, joint or back aches  NEUROLOGIC:  No chronic headaches, no seizures.  No numbness, tingling or weakness.  PSYCHIATRIC: Mood stable overall  ENDOCRINE:  No excessive urination or excessive thirst.  DERMATOLOGIC:  No rashes or skin changes.    HISTORIES  Current Outpatient Medications on File Prior to Visit   Medication Sig Dispense Refill    lisinopril (PRINIVIL;ZESTRIL) 2.5 MG tablet TAKE 1 TABLET BY MOUTH EVERY DAY 90 tablet 0    apixaban (ELIQUIS) 2.5 MG TABS tablet Take 1 tablet by mouth 2 times daily 180 tablet 1    propranolol (INDERAL LA) 120 MG extended release capsule TAKE 1 CAPSULE BY MOUTH EVERY DAY 90 capsule 3    Cholecalciferol (VITAMIN D) 50 MCG (2000 UT) CAPS capsule Take by mouth      Multiple Vitamins-Minerals (MULTIVITAMIN ADULT PO) Take 1 tablet by mouth daily      ondansetron (ZOFRAN) 4 MG tablet Take 1 tablet by mouth every 8 hours as needed for Nausea (Patient not taking: Reported on 6/28/2024) 20 tablet 0    furosemide (LASIX) 20 MG tablet TAKE 1 TABLET BY MOUTH EVERY DAY (Patient not taking: Reported on 6/28/2024) 30 tablet 5    Omega-3 Fatty Acids (FISH OIL PO) Take 1 capsule by mouth daily (Patient not taking: Reported on 12/19/2023)       No

## 2024-08-08 RX ORDER — APIXABAN 2.5 MG/1
2.5 TABLET, FILM COATED ORAL 2 TIMES DAILY
Qty: 180 TABLET | Refills: 0 | Status: SHIPPED | OUTPATIENT
Start: 2024-08-08

## 2024-08-08 NOTE — TELEPHONE ENCOUNTER
Refill request for ELIQUIS medication.     Name of Pharmacy- [facility]      Last visit - 6/28/24     Pending visit - 6/27/25    Last refill -5/7/24      Medication Contract signed -   Last Oarrs ran-         Additional Comments

## 2024-09-05 ENCOUNTER — TELEPHONE (OUTPATIENT)
Dept: CARDIOLOGY CLINIC | Age: 84
End: 2024-09-05

## 2024-09-05 NOTE — TELEPHONE ENCOUNTER
Pts daughter states pt has had issues with mouth dryness and redness  around her mouth for a few months. Pt went to the [facility] who told her it may be caused by the Eliquis. Daughter would like to move pts appt with NPKK to a sooner date if possible. Daughter states it can only be on a Friday and they would prefer to see EPMD instead of NP. Please advise.

## 2024-09-05 NOTE — TELEPHONE ENCOUNTER
Divya Mcnamara, APRN - CNP  You; Annabelle Juares Ep8 minutes ago (2:04 PM)       I have never heard of this and also asked Dr Meredith. However, anything is possible. Can switch to Xarelto if they want? If they prefer MD then they need to come Mon-Thurs.    Thank you!   I spoke with the patients daughter. They are agreeable to switch to Xarelto. Please send in to [facility] pharmacy. They will keep appt with VLADISLAV as scheduled.

## 2024-09-05 NOTE — PROGRESS NOTES
Eliquis discontinued and Xarelto ordered per patient and family request due to possible side effects from Eliquis of dry mouth and redness around the lips    Divya DELGADO

## 2024-09-17 DIAGNOSIS — I10 ESSENTIAL HYPERTENSION: ICD-10-CM

## 2024-09-18 RX ORDER — LISINOPRIL 2.5 MG/1
TABLET ORAL
Qty: 90 TABLET | Refills: 0 | Status: SHIPPED | OUTPATIENT
Start: 2024-09-18

## 2024-09-24 ENCOUNTER — HOSPITAL ENCOUNTER (INPATIENT)
Age: 84
LOS: 2 days | Discharge: HOME | End: 2024-09-26
Attending: EMERGENCY MEDICINE | Admitting: INTERNAL MEDICINE
Payer: MEDICARE

## 2024-09-24 ENCOUNTER — APPOINTMENT (OUTPATIENT)
Dept: CT IMAGING | Age: 84
End: 2024-09-24
Payer: MEDICARE

## 2024-09-24 ENCOUNTER — APPOINTMENT (OUTPATIENT)
Dept: GENERAL RADIOLOGY | Age: 84
End: 2024-09-24
Payer: MEDICARE

## 2024-09-24 DIAGNOSIS — R74.01 TRANSAMINITIS: Primary | ICD-10-CM

## 2024-09-24 DIAGNOSIS — E87.1 HYPONATREMIA: ICD-10-CM

## 2024-09-24 DIAGNOSIS — K80.21 CALCULUS OF GALLBLADDER WITH BILIARY OBSTRUCTION BUT WITHOUT CHOLECYSTITIS: ICD-10-CM

## 2024-09-24 DIAGNOSIS — R79.1 ELEVATED INR: ICD-10-CM

## 2024-09-24 DIAGNOSIS — R53.1 GENERALIZED WEAKNESS: ICD-10-CM

## 2024-09-24 LAB
ALBUMIN SERPL-MCNC: 4 G/DL (ref 3.4–5)
ALBUMIN/GLOB SERPL: 1.7 {RATIO} (ref 1.1–2.2)
ALP SERPL-CCNC: 204 U/L (ref 40–129)
ALT SERPL-CCNC: 398 U/L (ref 10–40)
ANION GAP SERPL CALCULATED.3IONS-SCNC: 7 MMOL/L (ref 3–16)
AST SERPL-CCNC: 250 U/L (ref 15–37)
BACTERIA URNS QL MICRO: ABNORMAL /HPF
BASOPHILS # BLD: 0 K/UL (ref 0–0.2)
BASOPHILS NFR BLD: 0.3 %
BILIRUB SERPL-MCNC: 1.9 MG/DL (ref 0–1)
BILIRUB UR QL STRIP.AUTO: ABNORMAL
BUN SERPL-MCNC: 18 MG/DL (ref 7–20)
CALCIUM SERPL-MCNC: 9.3 MG/DL (ref 8.3–10.6)
CHLORIDE SERPL-SCNC: 90 MMOL/L (ref 99–110)
CK SERPL-CCNC: 60 U/L (ref 26–192)
CLARITY UR: CLEAR
CO2 SERPL-SCNC: 31 MMOL/L (ref 21–32)
COLOR UR: YELLOW
CREAT SERPL-MCNC: 0.9 MG/DL (ref 0.6–1.2)
DEPRECATED RDW RBC AUTO: 13.9 % (ref 12.4–15.4)
EKG DIAGNOSIS: NORMAL
EKG Q-T INTERVAL: 444 MS
EKG QRS DURATION: 86 MS
EKG QTC CALCULATION (BAZETT): 409 MS
EKG R AXIS: 88 DEGREES
EKG T AXIS: 59 DEGREES
EKG VENTRICULAR RATE: 51 BPM
EOSINOPHIL # BLD: 0 K/UL (ref 0–0.6)
EOSINOPHIL NFR BLD: 0 %
EPI CELLS #/AREA URNS HPF: ABNORMAL /HPF (ref 0–5)
FLUAV RNA RESP QL NAA+PROBE: NOT DETECTED
FLUBV RNA RESP QL NAA+PROBE: NOT DETECTED
GFR SERPLBLD CREATININE-BSD FMLA CKD-EPI: 64 ML/MIN/{1.73_M2}
GLUCOSE SERPL-MCNC: 115 MG/DL (ref 70–99)
GLUCOSE UR STRIP.AUTO-MCNC: NEGATIVE MG/DL
HAV IGM SERPL QL IA: NORMAL
HBV CORE IGM SERPL QL IA: NORMAL
HBV SURFACE AG SERPL QL IA: NORMAL
HCT VFR BLD AUTO: 41.1 % (ref 36–48)
HCV AB SERPL QL IA: NORMAL
HGB BLD-MCNC: 13.6 G/DL (ref 12–16)
HGB UR QL STRIP.AUTO: NEGATIVE
HYALINE CASTS #/AREA URNS LPF: ABNORMAL /LPF (ref 0–2)
INR PPP: 1.58 (ref 0.85–1.15)
KETONES UR STRIP.AUTO-MCNC: NEGATIVE MG/DL
LEUKOCYTE ESTERASE UR QL STRIP.AUTO: NEGATIVE
LIPASE SERPL-CCNC: 24 U/L (ref 13–60)
LYMPHOCYTES # BLD: 0.8 K/UL (ref 1–5.1)
LYMPHOCYTES NFR BLD: 10.3 %
MCH RBC QN AUTO: 31.6 PG (ref 26–34)
MCHC RBC AUTO-ENTMCNC: 33.1 G/DL (ref 31–36)
MCV RBC AUTO: 95.4 FL (ref 80–100)
MONOCYTES # BLD: 0.6 K/UL (ref 0–1.3)
MONOCYTES NFR BLD: 7.8 %
MUCOUS THREADS #/AREA URNS LPF: ABNORMAL /LPF
NEUTROPHILS # BLD: 6.4 K/UL (ref 1.7–7.7)
NEUTROPHILS NFR BLD: 81.6 %
NITRITE UR QL STRIP.AUTO: NEGATIVE
PH UR STRIP.AUTO: 6 [PH] (ref 5–8)
PLATELET # BLD AUTO: 170 K/UL (ref 135–450)
PMV BLD AUTO: 8.3 FL (ref 5–10.5)
POTASSIUM SERPL-SCNC: 4.3 MMOL/L (ref 3.5–5.1)
PROT SERPL-MCNC: 6.3 G/DL (ref 6.4–8.2)
PROT UR STRIP.AUTO-MCNC: 30 MG/DL
PROTHROMBIN TIME: 19 SEC (ref 11.9–14.9)
RBC # BLD AUTO: 4.31 M/UL (ref 4–5.2)
RBC #/AREA URNS HPF: ABNORMAL /HPF (ref 0–4)
RENAL EPI CELLS #/AREA UR COMP ASSIST: ABNORMAL /HPF (ref 0–1)
SARS-COV-2 RNA RESP QL NAA+PROBE: NOT DETECTED
SODIUM SERPL-SCNC: 128 MMOL/L (ref 136–145)
SP GR UR STRIP.AUTO: >=1.03 (ref 1–1.03)
TROPONIN, HIGH SENSITIVITY: 14 NG/L (ref 0–14)
UA COMPLETE W REFLEX CULTURE PNL UR: ABNORMAL
UA DIPSTICK W REFLEX MICRO PNL UR: YES
URN SPEC COLLECT METH UR: ABNORMAL
UROBILINOGEN UR STRIP-ACNC: 2 E.U./DL
WBC # BLD AUTO: 7.8 K/UL (ref 4–11)
WBC #/AREA URNS HPF: ABNORMAL /HPF (ref 0–5)

## 2024-09-24 PROCEDURE — 82550 ASSAY OF CK (CPK): CPT

## 2024-09-24 PROCEDURE — 51701 INSERT BLADDER CATHETER: CPT

## 2024-09-24 PROCEDURE — 51798 US URINE CAPACITY MEASURE: CPT

## 2024-09-24 PROCEDURE — 85610 PROTHROMBIN TIME: CPT

## 2024-09-24 PROCEDURE — 80074 ACUTE HEPATITIS PANEL: CPT

## 2024-09-24 PROCEDURE — 1200000000 HC SEMI PRIVATE

## 2024-09-24 PROCEDURE — 2580000003 HC RX 258: Performed by: INTERNAL MEDICINE

## 2024-09-24 PROCEDURE — 99285 EMERGENCY DEPT VISIT HI MDM: CPT

## 2024-09-24 PROCEDURE — 93010 ELECTROCARDIOGRAM REPORT: CPT | Performed by: INTERNAL MEDICINE

## 2024-09-24 PROCEDURE — 84484 ASSAY OF TROPONIN QUANT: CPT

## 2024-09-24 PROCEDURE — 85025 COMPLETE CBC W/AUTO DIFF WBC: CPT

## 2024-09-24 PROCEDURE — 71045 X-RAY EXAM CHEST 1 VIEW: CPT

## 2024-09-24 PROCEDURE — 6360000004 HC RX CONTRAST MEDICATION: Performed by: PHYSICIAN ASSISTANT

## 2024-09-24 PROCEDURE — 6370000000 HC RX 637 (ALT 250 FOR IP): Performed by: INTERNAL MEDICINE

## 2024-09-24 PROCEDURE — 83690 ASSAY OF LIPASE: CPT

## 2024-09-24 PROCEDURE — 2580000003 HC RX 258: Performed by: PHYSICIAN ASSISTANT

## 2024-09-24 PROCEDURE — 93005 ELECTROCARDIOGRAM TRACING: CPT | Performed by: PHYSICIAN ASSISTANT

## 2024-09-24 PROCEDURE — 80053 COMPREHEN METABOLIC PANEL: CPT

## 2024-09-24 PROCEDURE — 96360 HYDRATION IV INFUSION INIT: CPT

## 2024-09-24 PROCEDURE — 74177 CT ABD & PELVIS W/CONTRAST: CPT

## 2024-09-24 PROCEDURE — 87636 SARSCOV2 & INF A&B AMP PRB: CPT

## 2024-09-24 PROCEDURE — 81001 URINALYSIS AUTO W/SCOPE: CPT

## 2024-09-24 RX ORDER — ONDANSETRON 4 MG/1
4 TABLET, ORALLY DISINTEGRATING ORAL EVERY 8 HOURS PRN
Status: DISCONTINUED | OUTPATIENT
Start: 2024-09-24 | End: 2024-09-26 | Stop reason: HOSPADM

## 2024-09-24 RX ORDER — ACETAMINOPHEN 650 MG/1
650 SUPPOSITORY RECTAL EVERY 6 HOURS PRN
Status: DISCONTINUED | OUTPATIENT
Start: 2024-09-24 | End: 2024-09-26 | Stop reason: HOSPADM

## 2024-09-24 RX ORDER — SODIUM CHLORIDE 9 MG/ML
INJECTION, SOLUTION INTRAVENOUS CONTINUOUS
Status: ACTIVE | OUTPATIENT
Start: 2024-09-24 | End: 2024-09-25

## 2024-09-24 RX ORDER — SODIUM CHLORIDE 0.9 % (FLUSH) 0.9 %
5-40 SYRINGE (ML) INJECTION EVERY 12 HOURS SCHEDULED
Status: DISCONTINUED | OUTPATIENT
Start: 2024-09-24 | End: 2024-09-26 | Stop reason: HOSPADM

## 2024-09-24 RX ORDER — MAGNESIUM SULFATE IN WATER 40 MG/ML
2000 INJECTION, SOLUTION INTRAVENOUS PRN
Status: DISCONTINUED | OUTPATIENT
Start: 2024-09-24 | End: 2024-09-26 | Stop reason: HOSPADM

## 2024-09-24 RX ORDER — PROPRANOLOL HCL 60 MG
120 CAPSULE, EXTENDED RELEASE 24HR ORAL DAILY
Status: DISCONTINUED | OUTPATIENT
Start: 2024-09-25 | End: 2024-09-26 | Stop reason: HOSPADM

## 2024-09-24 RX ORDER — IOPAMIDOL 755 MG/ML
75 INJECTION, SOLUTION INTRAVASCULAR
Status: COMPLETED | OUTPATIENT
Start: 2024-09-24 | End: 2024-09-24

## 2024-09-24 RX ORDER — POTASSIUM CHLORIDE 1500 MG/1
40 TABLET, EXTENDED RELEASE ORAL PRN
Status: DISCONTINUED | OUTPATIENT
Start: 2024-09-24 | End: 2024-09-26 | Stop reason: HOSPADM

## 2024-09-24 RX ORDER — ACETAMINOPHEN 325 MG/1
650 TABLET ORAL EVERY 6 HOURS PRN
Status: DISCONTINUED | OUTPATIENT
Start: 2024-09-24 | End: 2024-09-26 | Stop reason: HOSPADM

## 2024-09-24 RX ORDER — POLYETHYLENE GLYCOL 3350 17 G/17G
17 POWDER, FOR SOLUTION ORAL DAILY PRN
Status: DISCONTINUED | OUTPATIENT
Start: 2024-09-24 | End: 2024-09-26 | Stop reason: HOSPADM

## 2024-09-24 RX ORDER — SODIUM CHLORIDE 9 MG/ML
INJECTION, SOLUTION INTRAVENOUS PRN
Status: DISCONTINUED | OUTPATIENT
Start: 2024-09-24 | End: 2024-09-26 | Stop reason: HOSPADM

## 2024-09-24 RX ORDER — ONDANSETRON 2 MG/ML
4 INJECTION INTRAMUSCULAR; INTRAVENOUS EVERY 6 HOURS PRN
Status: DISCONTINUED | OUTPATIENT
Start: 2024-09-24 | End: 2024-09-26 | Stop reason: HOSPADM

## 2024-09-24 RX ORDER — LISINOPRIL 2.5 MG/1
2.5 TABLET ORAL DAILY
Status: DISCONTINUED | OUTPATIENT
Start: 2024-09-25 | End: 2024-09-26 | Stop reason: HOSPADM

## 2024-09-24 RX ORDER — SODIUM CHLORIDE 0.9 % (FLUSH) 0.9 %
5-40 SYRINGE (ML) INJECTION PRN
Status: DISCONTINUED | OUTPATIENT
Start: 2024-09-24 | End: 2024-09-26 | Stop reason: HOSPADM

## 2024-09-24 RX ORDER — POTASSIUM CHLORIDE 7.45 MG/ML
10 INJECTION INTRAVENOUS PRN
Status: DISCONTINUED | OUTPATIENT
Start: 2024-09-24 | End: 2024-09-26 | Stop reason: HOSPADM

## 2024-09-24 RX ORDER — 0.9 % SODIUM CHLORIDE 0.9 %
1000 INTRAVENOUS SOLUTION INTRAVENOUS ONCE
Status: COMPLETED | OUTPATIENT
Start: 2024-09-24 | End: 2024-09-24

## 2024-09-24 RX ADMIN — SODIUM CHLORIDE, PRESERVATIVE FREE 10 ML: 5 INJECTION INTRAVENOUS at 20:56

## 2024-09-24 RX ADMIN — IOPAMIDOL 75 ML: 755 INJECTION, SOLUTION INTRAVENOUS at 15:29

## 2024-09-24 RX ADMIN — SODIUM CHLORIDE: 9 INJECTION, SOLUTION INTRAVENOUS at 20:56

## 2024-09-24 RX ADMIN — APIXABAN 2.5 MG: 2.5 TABLET, FILM COATED ORAL at 20:56

## 2024-09-24 RX ADMIN — SODIUM CHLORIDE 1000 ML: 9 INJECTION, SOLUTION INTRAVENOUS at 13:30

## 2024-09-24 ASSESSMENT — PAIN SCALES - GENERAL
PAINLEVEL_OUTOF10: 0
PAINLEVEL_OUTOF10: 0

## 2024-09-24 ASSESSMENT — LIFESTYLE VARIABLES
HOW MANY STANDARD DRINKS CONTAINING ALCOHOL DO YOU HAVE ON A TYPICAL DAY: PATIENT DOES NOT DRINK
HOW OFTEN DO YOU HAVE A DRINK CONTAINING ALCOHOL: NEVER

## 2024-09-24 NOTE — ED PROVIDER NOTES
[facility]  ED  EMERGENCY DEPARTMENT ENCOUNTER        Pt Name: Stephania Sims  MRN: [id number]  Birthdate 1940  Date of evaluation: 9/24/2024  Provider: GISSELLE ACOSTA PA-C  PCP: Azeem Sterling MD  ED Attending: MD Lizeth       I have seen and evaluated this patient with my supervising physician Jayro Stanley*.    CHIEF COMPLAINT:     Chief Complaint   Patient presents with    Urinary Retention     Went to PCP today for concern for UTI; 2x episodes of of dark colored urine.   Patient had 500 mL of PO fluid at PCP and only had diarrhea. Sent to ED for evaluation.        HISTORY OF PRESENT ILLNESS:      History provided by the patient. No limitations.    Stephania Sims is a 84 y.o. female who arrives to the ED by private vehicle.  Patient is accompanied by her son-in-law.  She is here today concerned about possible UTI.  Yesterday, she was feeling a little weak, son-in-law states she was maybe very mildly confused.  This morning she was actually doing better, but when she urinated this morning, she states her urine was dark, almost the color of coffee.  She went to the [facility].  She states she was unable to provide a urine sample for them despite drinking 2 bottles of water and some coffee while waiting. She ended up having an episode of diarrhea, but still could not provide a urine sample.  She states she was sent here from the [facility] due to concerns for possible urinary retention.  Denies any fevers or chills, headaches, dizziness, chest pain, dyspnea, abdominal pain, nausea or vomiting. She lives alone with her dog.    Nursing Notes were reviewed     REVIEW OF SYSTEMS:     Review of Systems  Positives and pertinent negatives as per HPI.      PAST MEDICAL HISTORY:     Past Medical History:   Diagnosis Date    A-fib (HCC)     Arthritis     Basal cell carcinoma of skin of trunk 05/2011    back    Benign familial tremor     sxs began in HS    Decreased bone    SpO2: 99% 92% 100% 98%   Weight:       Height:           Patient was given the following medications:  Medications   sodium chloride 0.9 % bolus 1,000 mL (0 mLs IntraVENous Stopped 9/24/24 1433)   iopamidol (ISOVUE-370) 76 % injection 75 mL (75 mLs IntraVENous Given 9/24/24 1529)       Patient was evaluated by both myself and MD Lizeth     CC/HPI Summary, DDx, ED course, and Reassessment: Patient arrives to the ED with her son-in-law.  There is concern about possible UTI based on dark urine and some generalized weakness.    Differential diagnoses: UTI, urinary retention, pyelonephritis, ureteral calculi, SANKET, electrolyte imbalance    Patient is evaluated in ED room 32  Vital signs normal upon arrival  Bladder scan only shows 117 mL of urine  Based on history and physical I ordered CBC, metabolic panel, UA.    CBC shows a WBC of 7.8. H&H 13.6 and 41.1  CMP significant for hyponatremia of 128 and chloride low at 90. Normal BUN and Cr. Totally bilirubin is up at 1.9. Alk phos is 204.  and   UA shows moderate bilirubin. No sign of infection.    Patient was reassessed and told that given the transaminitis, I would expand her work up.    Lipase 24  Trop 14  PT 19.0 and INR 1.58  CK 50  Rapid COVID and flu is negative  Hepatitis panel is negative  EKG shows a fib, rate in the 50s  Chest xray is negative  CT abd/pelvis shows no acute abnormality. Cholelithiasis    The patient received NS 1 L IV in the ED. I consulted the hospitalist, Dr. Salomon for admission and he requested I reach out to GI as well.    Exclusion criteria - the patient is NOT to be included for SEP-1 Core Measure due to:  Infection is not suspected     Consults/discussion with other professionals: IP CONSULT TO GI  Social determinants: None  Chronic conditions: Benign familial tremor, hypertension, arthritis, [id number] on Xarelto  Records reviewed: None    Disposition considerations/Plan (include 1 test not done- why

## 2024-09-24 NOTE — ED NOTES
Stephania Sims is a 84 y.o. female admitted for  Principal Problem:    General weakness  Resolved Problems:    * No resolved hospital problems. *  .   Patient Home via family with   Chief Complaint   Patient presents with    Urinary Retention     Went to PCP today for concern for UTI; 2x episodes of of dark colored urine.   Patient had 500 mL of PO fluid at PCP and only had diarrhea. Sent to ED for evaluation.    .  Patient is alert and Person, Place, Time, and Situation  Patient's baseline mobility: x1   Code Status: Prior   Cardiac Rhythm:       Is patient on baseline Oxygen: no how many Liters:   Abnormal Assessment Findings:     Isolation: None      NIH Score:    C-SSRS: Risk of Suicide: No Risk  Bedside swallow:        Active LDA's:   Peripheral IV 09/24/24 Left Antecubital (Active)     Patient admitted with a clifford:  If the clifford is chronic was it exchanged:  Reason for clifford:   Patient admitted with Central Line:  . PICC line placement confirmed: YES OR NO:[id number]}   Reason for Central line:   Was central line Inserted from an outside facility:        Family/Caregiver Present yes Any Concerns:    Restraints   Sitter          Vitals: MEWS Score: 1    Vitals:    09/24/24 1501 09/24/24 1619 09/24/24 1742 09/24/24 1856   BP: 127/63 (!) 140/81 118/76 115/66   Pulse: 55 58 59 69   Resp: 21 16 18 18   Temp:       TempSrc:       SpO2: 92% 100% 98% 94%   Weight:       Height:           Last documented pain score (0-10 scale) Pain Level: 0  Pain medication administered No.    Pertinent or High Risk Medications/Drips: .    Pending Blood Product Administration:     Abnormal labs:   Abnormal Labs Reviewed   URINALYSIS WITH REFLEX TO CULTURE - Abnormal; Notable for the following components:       Result Value    Bilirubin, Urine MODERATE (*)     Protein, UA 30 (*)     Urobilinogen, Urine 2.0 (*)     All other components within normal limits   CBC WITH AUTO DIFFERENTIAL - Abnormal; Notable for the following components:

## 2024-09-24 NOTE — H&P
Hospital Medicine History & Physical      Date of Admission: 9/24/2024    Date of Service:  Pt seen/examined on 9/24/24     [x]Admitted to Inpatient with expected LOS greater than two midnights due to medical therapy.  []Placed in Observation status.    Chief Admission Complaint:  weakness    Presenting Admission History:      84 y.o. female with hx of tremors, afib, htn who [facility] with [facility]. Pt was noted to have difficulty getting off couch yesterday evening. Son in law came over and helped her up and into bed and spent the night watching over.  Pt had some rt knee pain at the time and thought this may have contributed.  She went to [facility] due to seeing brown colored urine and was told to come to ER given concerns for blood.  Of note, 1-2 weeks ago, pt was again seen at urgent care for possible UTI and was given abx(completed course) at the time.  -she feels her appetite has been ok  -she noted some chills but no recorded fever  -No n/v  -noted some diarrhea  -No sob/cp/cough  -No abd pain        Assessment/Plan:      Current Principal Problem:  General weakness    Gen weakness- possibly 2/2 hypontremia(128 on arrival), UA neg for infection, cxr unremarkable  -challenged with Ivfs NS 50ml/hr x 10 hr)  -Labs monitored  -Pt/ot eval ordered    Abn lfts/hepatitis- unclear etiology but possibly choledocolithaisis, CTa/p done and no acute liver abn  -Gi consulted  -Acute hep panel  -Clears diet  -Npo mn    Afib-rate controlled  -On adjusted dose eliquis currently  -Tele ordered    Htn-stable  -on Acei  -No longer on lasix    Essential Tremors- on propranolol , has not seen neurology in the past    Discussed management and the need for Hospitalization of the patient w/ the Emergency Department Provider: Benny SPRAGUE    CXR: I have reviewed the CXR with the following interpretation: no acute abn appreciated    EKG:  I have reviewed the EKG with the following interpretation: afib,  rate of 51, nl axis, no gross ischemia appreciated    Physical Exam Performed:      /75   Pulse 62   Temp 98.3 °F (36.8 °C) (Oral)   Resp 18   Ht 1.651 m (5' 5\")   Wt 54.5 kg (120 lb 3.2 oz)   LMP 03/01/1992   SpO2 96%   BMI 20.00 kg/m²     General appearance:  No apparent distress, appears stated age and cooperative.  HEENT:  Pupils equal, round, and reactive to light. Conjunctivae/corneas clear.  Respiratory:  Normal respiratory effort. Clear to auscultation, bilaterally without Rales/Wheezes/Rhonchi.  Cardiovascular:  Regular rate and rhythm with normal S1/S2 without murmurs, rubs or gallops.  Abdomen:  Soft, non-tender, non-distended with normal bowel sounds.  Musculoskeletal:  No clubbing, cyanosis or edema bilaterally.  Full range of motion without deformity.  Neurologic:  Neurovascularly intact without any focal sensory/motor deficits. Cranial nerves: II-XII intact, grossly non-focal. Mild resting tremors noted  Psychiatric:  Alert and oriented, thought content appropriate, normal insight  Skin:  Skin color, texture, turgor normal.  No rashes or lesions.  Capillary Refill:  Brisk,< 3 seconds   Peripheral Pulses:  +2 palpable, equal bilaterally     Diet: [x]Adult/General  []Cardiac  []Diabetic  []Low Fat  []NPO  []NPO after Midnight  []Other     DVT Prophylaxis: []PPx LMWH  []SQ Heparin  []IPC/SCDs  [x]Eliquis  []Xarelto  []Coumadin     Code status: [x]Full  []DNR/CCA  []Limited (DNR/CCA with Do Not Intubate)  []DNRCC    Surrogate Decision Maker: daughter    PT/OT Eval Status:   []NOT yet ordered  [x]Ordered and Pending   []Seen with Recommendations for:  []Home independently  []Home w/ assist  []HHC  []SNF  []Acute Rehab    Anticipated Discharge Day/Date:  pending workup    Anticipated Discharge Location: []Home  [x]HHC  []SNF  []Acute Rehab  []ECF  []LTAC  []Hospice    Consults:      IP CONSULT TO GI    I personally have obtained, updated and/or reviewed the patient’s medication list on  identified, however evaluation limited by streak artifact. GI tract: The stomach is mildly distended with enteric contents. No evidence of bowel obstruction.  The appendix is visualized and normal in appearance. Peritoneum: No evidence of ascites or peritoneal nodularity. Lymph nodes: No evidence of suspicious lymphadenopathy. Vasculature: Normal. Soft Tissues/Bones: No acute soft tissue abnormalities are evident. No acute osseous abnormalities or suspicious lesions are present.  Bones are diffusely demineralized.  Postsurgical changes from right hip arthroplasty.     No acute abdominopelvic abnormality identified. Cholelithiasis.     XR CHEST PORTABLE    Result Date: 9/24/2024  EXAMINATION: ONE XRAY VIEW OF THE CHEST 9/24/2024 2:04 pm COMPARISON: 11/08/2023 HISTORY: ORDERING SYSTEM PROVIDED HISTORY: weak, mild altered mental status TECHNOLOGIST PROVIDED HISTORY: Reason for exam:->weak, mild altered mental status Reason for Exam: weak, mild altered mental status FINDINGS: There is biapical and bibasilar scarring.  Cardiomegaly is stable.  There is no pneumothorax or pleural effusion.     1.  No acute abnormality.       PCP: Azeem Sterling MD    Past Medical History:        Diagnosis Date    A-fib (HCC)     Arthritis     Basal cell carcinoma of skin of trunk 05/2011    back    Benign familial tremor     sxs began in HS    Decreased bone density     Hip fracture (HCC) 02/2012    sustained in fall    Hypertension     Right inguinal hernia        Past Surgical History:        Procedure Laterality Date    DENTAL SURGERY  1/2013    FOREARM SURGERY Left 2/9/2023    OPEN REDUCTION AND INTERNAL FIXATION LEFT DISTAL RADIUS FRACTURE performed by Willie Rockwell MD at [facility] OR    HERNIA REPAIR Right 4/10/2019    DAVINCI RIGHT INGUINAL HERNIA REPAIR performed by Chuy Hardy MD at [facility] OR    JOINT REPLACEMENT  2/13/12    right bipolar hip replacement    OTHER SURGICAL HISTORY  10/2011 approx    mole removal from back

## 2024-09-24 NOTE — ED PROVIDER NOTES
I independently evaluated and obtained a history and physical on Stephania Sims. I personally saw the patient and made/approved the management plan and take responsibility for the patient management.    All diagnostic, treatment, and disposition assistants were made to myself in conjunction the advanced practice provider.    For further details of this patient's emergency department encounter, please see the advanced practice provider's documentation.    History: This patient is AN 84-year-old female with a past medical history of A-fib for which she is on a DOAC, arthritis, benign tremor, hypertension.  She states she been feeling pretty well.  Yesterday she had lunch.  She then sat in a chair.  She was so tired she could not get up and out of the chairs around 10:00 PM family came over and got her out of the chair.  She states this morning she urinated her urine was very dark.  She denies any light stool.  No blood in her stool.    Physician Exam: Pleasant female no acute distress.  She is then.  She has tremor at rest.  She is alert and oriented x 4.  Normal speech.  Normal thought.  Abdomen soft, nontender, nondistended.  No right upper quadrant pain.    I personally saw the patient and performed a substantive portion of the visit including all aspects of the medical decision making.    MDM:     DDX: Anemia, gastrointestinal bleeding, dehydration, rhabdomyolysis, UTI, pyelonephritis, COVID, flu, hepatitis, choledocholithiasis, liver mass, other    Workup with the patient showed no COVID or flu.  Acute hepatitis panel was negative.  Her INR is 1.58.  Total CK is 60 so I do not suspect rhabdomyolysis.  Lipase is normal.  Urinalysis is without obvious infection.  CMP is remarkable for sodium of 128, chloride 90, glucose 115.  Patient's total bilirubin is 1.9 with an alk phos of 204 and an ALT of 398 and an AST of 250.  CBC is unremarkable.  CT of the patient's chest abdomen pelvis with IV contrast shows no acute  abdominal pelvic abnormality identified but incidentally they did note cholelithiasis but no evidence of choledocholithiasis according to the radiology report.  We spoke to gastroenterology and the patient will be admitted to the medicine service for further workup and evaluation.      I spoke to the patient about end-of-life care.  At this time she is a full code.     Jayro Stanley MD  09/24/24 8685

## 2024-09-25 ENCOUNTER — APPOINTMENT (OUTPATIENT)
Dept: ULTRASOUND IMAGING | Age: 84
End: 2024-09-25
Payer: MEDICARE

## 2024-09-25 LAB
ALBUMIN SERPL-MCNC: 3.1 G/DL (ref 3.4–5)
ALP SERPL-CCNC: 162 U/L (ref 40–129)
ALT SERPL-CCNC: 250 U/L (ref 10–40)
ANION GAP SERPL CALCULATED.3IONS-SCNC: 9 MMOL/L (ref 3–16)
AST SERPL-CCNC: 118 U/L (ref 15–37)
BASOPHILS # BLD: 0.1 K/UL (ref 0–0.2)
BASOPHILS NFR BLD: 1 %
BILIRUB DIRECT SERPL-MCNC: 0.5 MG/DL (ref 0–0.3)
BILIRUB INDIRECT SERPL-MCNC: 0.6 MG/DL (ref 0–1)
BILIRUB SERPL-MCNC: 1.1 MG/DL (ref 0–1)
BUN SERPL-MCNC: 13 MG/DL (ref 7–20)
CALCIUM SERPL-MCNC: 8.5 MG/DL (ref 8.3–10.6)
CHLORIDE SERPL-SCNC: 93 MMOL/L (ref 99–110)
CO2 SERPL-SCNC: 24 MMOL/L (ref 21–32)
CREAT SERPL-MCNC: 0.8 MG/DL (ref 0.6–1.2)
DEPRECATED RDW RBC AUTO: 13.9 % (ref 12.4–15.4)
EOSINOPHIL # BLD: 0 K/UL (ref 0–0.6)
EOSINOPHIL NFR BLD: 0.4 %
GFR SERPLBLD CREATININE-BSD FMLA CKD-EPI: 73 ML/MIN/{1.73_M2}
GLUCOSE SERPL-MCNC: 88 MG/DL (ref 70–99)
HCT VFR BLD AUTO: 38.5 % (ref 36–48)
HGB BLD-MCNC: 12.6 G/DL (ref 12–16)
LYMPHOCYTES # BLD: 1.5 K/UL (ref 1–5.1)
LYMPHOCYTES NFR BLD: 29 %
MCH RBC QN AUTO: 31.2 PG (ref 26–34)
MCHC RBC AUTO-ENTMCNC: 32.6 G/DL (ref 31–36)
MCV RBC AUTO: 95.7 FL (ref 80–100)
MONOCYTES # BLD: 0.8 K/UL (ref 0–1.3)
MONOCYTES NFR BLD: 15.9 %
NEUTROPHILS # BLD: 2.8 K/UL (ref 1.7–7.7)
NEUTROPHILS NFR BLD: 53.7 %
OSMOLALITY SERPL: 274 MOSM/KG (ref 280–301)
PLATELET # BLD AUTO: 143 K/UL (ref 135–450)
PMV BLD AUTO: 8.9 FL (ref 5–10.5)
POTASSIUM SERPL-SCNC: 4.1 MMOL/L (ref 3.5–5.1)
PROT SERPL-MCNC: 5.3 G/DL (ref 6.4–8.2)
RBC # BLD AUTO: 4.03 M/UL (ref 4–5.2)
SODIUM SERPL-SCNC: 126 MMOL/L (ref 136–145)
SODIUM SERPL-SCNC: 131 MMOL/L (ref 136–145)
SODIUM UR-SCNC: 35 MMOL/L
WBC # BLD AUTO: 5.3 K/UL (ref 4–11)

## 2024-09-25 PROCEDURE — 84295 ASSAY OF SERUM SODIUM: CPT

## 2024-09-25 PROCEDURE — 2580000003 HC RX 258: Performed by: INTERNAL MEDICINE

## 2024-09-25 PROCEDURE — 97530 THERAPEUTIC ACTIVITIES: CPT

## 2024-09-25 PROCEDURE — 85025 COMPLETE CBC W/AUTO DIFF WBC: CPT

## 2024-09-25 PROCEDURE — 97161 PT EVAL LOW COMPLEX 20 MIN: CPT

## 2024-09-25 PROCEDURE — 84300 ASSAY OF URINE SODIUM: CPT

## 2024-09-25 PROCEDURE — 97165 OT EVAL LOW COMPLEX 30 MIN: CPT

## 2024-09-25 PROCEDURE — 6370000000 HC RX 637 (ALT 250 FOR IP): Performed by: INTERNAL MEDICINE

## 2024-09-25 PROCEDURE — 83935 ASSAY OF URINE OSMOLALITY: CPT

## 2024-09-25 PROCEDURE — 97116 GAIT TRAINING THERAPY: CPT

## 2024-09-25 PROCEDURE — 36415 COLL VENOUS BLD VENIPUNCTURE: CPT

## 2024-09-25 PROCEDURE — 83930 ASSAY OF BLOOD OSMOLALITY: CPT

## 2024-09-25 PROCEDURE — 80076 HEPATIC FUNCTION PANEL: CPT

## 2024-09-25 PROCEDURE — 76705 ECHO EXAM OF ABDOMEN: CPT

## 2024-09-25 PROCEDURE — 80048 BASIC METABOLIC PNL TOTAL CA: CPT

## 2024-09-25 PROCEDURE — 1200000000 HC SEMI PRIVATE

## 2024-09-25 RX ADMIN — SODIUM CHLORIDE, PRESERVATIVE FREE 10 ML: 5 INJECTION INTRAVENOUS at 19:58

## 2024-09-25 RX ADMIN — Medication 15 G: at 16:33

## 2024-09-25 RX ADMIN — LISINOPRIL 2.5 MG: 2.5 TABLET ORAL at 10:30

## 2024-09-25 RX ADMIN — PROPRANOLOL HYDROCHLORIDE 120 MG: 60 CAPSULE, EXTENDED RELEASE ORAL at 10:29

## 2024-09-25 RX ADMIN — APIXABAN 2.5 MG: 2.5 TABLET, FILM COATED ORAL at 10:30

## 2024-09-25 NOTE — PLAN OF CARE
Problem: Pain  Goal: Verbalizes/displays adequate comfort level or baseline comfort level  9/25/2024 1916 by Madelyn Luna RN  Outcome: Progressing  Flowsheets (Taken 9/25/2024 1916)  Verbalizes/displays adequate comfort level or baseline comfort level:   Encourage patient to monitor pain and request assistance   Administer analgesics based on type and severity of pain and evaluate response   Assess pain using appropriate pain scale   Implement non-pharmacological measures as appropriate and evaluate response     Problem: Skin/Tissue Integrity  Goal: Absence of new skin breakdown  Description: 1.  Monitor for areas of redness and/or skin breakdown  2.  Assess vascular access sites hourly  3.  Every 4-6 hours minimum:  Change oxygen saturation probe site  4.  Every 4-6 hours:  If on nasal continuous positive airway pressure, respiratory therapy assess nares and determine need for appliance change or resting period.  9/25/2024 1916 by Madelyn Luna RN  Outcome: Progressing     Problem: ABCDS Injury Assessment  Goal: Absence of physical injury  9/25/2024 1916 by Madelyn Luna RN  Outcome: Progressing  Flowsheets (Taken 9/25/2024 1916)  Absence of Physical Injury: Implement safety measures based on patient assessment     Problem: Neurosensory - Adult  Goal: Achieves stable or improved neurological status  9/25/2024 1916 by Madelyn Luna RN  Outcome: Progressing  Flowsheets (Taken 9/25/2024 1916)  Achieves stable or improved neurological status:   Assess for and report changes in neurological status   Initiate measures to prevent increased intracranial pressure   Maintain blood pressure and fluid volume within ordered parameters to optimize cerebral perfusion and minimize risk of hemorrhage   Monitor temperature, glucose, and sodium. Initiate appropriate interventions as ordered     Problem: Skin/Tissue Integrity - Adult  Goal: Skin integrity remains intact  9/25/2024 1916 by Madelyn Luna, RN  Outcome:  Progressing  Flowsheets (Taken 9/25/2024 1916)  Skin Integrity Remains Intact:   Monitor for areas of redness and/or skin breakdown   Assess vascular access sites hourly     Problem: Musculoskeletal - Adult  Goal: Return mobility to safest level of function  9/25/2024 1916 by Madelyn Luna RN  Outcome: Progressing  Flowsheets (Taken 9/25/2024 1916)  Return Mobility to Safest Level of Function:   Assess patient stability and activity tolerance for standing, transferring and ambulating with or without assistive devices   Assist with transfers and ambulation using safe patient handling equipment as needed   Ensure adequate protection for wounds/incisions during mobilization   Obtain physical therapy/occupational therapy consults as needed     Problem: Discharge Planning  Goal: Discharge to home or other facility with appropriate resources  9/25/2024 1916 by Madelyn Luna RN  Outcome: Progressing  Flowsheets (Taken 9/25/2024 1916)  Discharge to home or other facility with appropriate resources:   Identify barriers to discharge with patient and caregiver   Arrange for needed discharge resources and transportation as appropriate   Identify discharge learning needs (meds, wound care, etc)     Problem: Safety - Adult  Goal: Free from fall injury  9/25/2024 1916 by Madelyn Luna RN  Outcome: Progressing  Flowsheets (Taken 9/25/2024 1916)  Free From Fall Injury:   Instruct family/caregiver on patient safety   Based on caregiver fall risk screen, instruct family/caregiver to ask for assistance with transferring infant if caregiver noted to have fall risk factors     Problem: Cardiovascular - Adult  Goal: Maintains optimal cardiac output and hemodynamic stability  Flowsheets (Taken 9/25/2024 1916)  Maintains optimal cardiac output and hemodynamic stability:   Monitor blood pressure and heart rate   Monitor urine output and notify Licensed Independent Practitioner for values outside of normal range   Assess for signs of  decreased cardiac output   Administer fluid and/or volume expanders as ordered   Administer vasoactive medications as ordered

## 2024-09-25 NOTE — PLAN OF CARE
Problem: Pain  Goal: Verbalizes/displays adequate comfort level or baseline comfort level  9/25/2024 1119 by Roxie Nugent RN  Outcome: Progressing  Flowsheets (Taken 9/24/2024 2205 by Madelyn Luna RN)  Verbalizes/displays adequate comfort level or baseline comfort level:   Encourage patient to monitor pain and request assistance   Assess pain using appropriate pain scale   Administer analgesics based on type and severity of pain and evaluate response   Implement non-pharmacological measures as appropriate and evaluate response  9/24/2024 2205 by Madelyn Luna RN  Outcome: Progressing  Flowsheets (Taken 9/24/2024 2205)  Verbalizes/displays adequate comfort level or baseline comfort level:   Encourage patient to monitor pain and request assistance   Assess pain using appropriate pain scale   Administer analgesics based on type and severity of pain and evaluate response   Implement non-pharmacological measures as appropriate and evaluate response     Problem: Skin/Tissue Integrity  Goal: Absence of new skin breakdown  Description: 1.  Monitor for areas of redness and/or skin breakdown  2.  Assess vascular access sites hourly  3.  Every 4-6 hours minimum:  Change oxygen saturation probe site  4.  Every 4-6 hours:  If on nasal continuous positive airway pressure, respiratory therapy assess nares and determine need for appliance change or resting period.  Outcome: Progressing     Problem: ABCDS Injury Assessment  Goal: Absence of physical injury  9/25/2024 1119 by Roxie Nugent RN  Outcome: Progressing  Flowsheets (Taken 9/24/2024 2205 by Madelyn Luna RN)  Absence of Physical Injury: Implement safety measures based on patient assessment  9/24/2024 2205 by Madelyn Luna RN  Outcome: Progressing  Flowsheets (Taken 9/24/2024 2205)  Absence of Physical Injury: Implement safety measures based on patient assessment     Problem: Neurosensory - Adult  Goal: Achieves stable or improved neurological  patient stability and activity tolerance for standing, transferring and ambulating with or without assistive devices   Assist with transfers and ambulation using safe patient handling equipment as needed   Ensure adequate protection for wounds/incisions during mobilization   Obtain physical therapy/occupational therapy consults as needed  9/24/2024 2205 by Madelyn Luna, RN  Outcome: Progressing  Flowsheets (Taken 9/24/2024 2205)  Return Mobility to Safest Level of Function:   Assess patient stability and activity tolerance for standing, transferring and ambulating with or without assistive devices   Assist with transfers and ambulation using safe patient handling equipment as needed   Ensure adequate protection for wounds/incisions during mobilization   Obtain physical therapy/occupational therapy consults as needed  Goal: Maintain proper alignment of affected body part  Outcome: Progressing  Flowsheets (Taken 9/25/2024 1119)  Maintain proper alignment of affected body part: Support and protect limb and body alignment per provider's orders  Goal: Return ADL status to a safe level of function  Outcome: Progressing  Flowsheets (Taken 9/25/2024 1119)  Return ADL Status to a Safe Level of Function: Administer medication as ordered     Problem: Discharge Planning  Goal: Discharge to home or other facility with appropriate resources  9/25/2024 1119 by Roxie Nugent, RN  Outcome: Progressing  Flowsheets (Taken 9/24/2024 2205 by Madelyn Luna, RN)  Discharge to home or other facility with appropriate resources:   Arrange for needed discharge resources and transportation as appropriate   Identify discharge learning needs (meds, wound care, etc)   Identify barriers to discharge with patient and caregiver  9/24/2024 2205 by Madelyn Luna, RN  Outcome: Progressing  Flowsheets (Taken 9/24/2024 2205)  Discharge to home or other facility with appropriate resources:   Arrange for needed discharge resources and  transportation as appropriate   Identify discharge learning needs (meds, wound care, etc)   Identify barriers to discharge with patient and caregiver     Problem: Safety - Adult  Goal: Free from fall injury  Outcome: Progressing  Flowsheets (Taken 9/25/2024 1119)  Free From Fall Injury: Instruct family/caregiver on patient safety

## 2024-09-25 NOTE — PROGRESS NOTES
4 Eyes Skin Assessment     NAME:  Stephania Sims  YOB: 1940  MEDICAL RECORD NUMBER:  [id number]    The patient is being assessed for  Admission    I agree that at least one RN has performed a thorough Head to Toe Skin Assessment on the patient. ALL assessment sites listed below have been assessed.      Areas assessed by both nurses: Madelyn RN/ Jean RN     Head, Face, Ears, Shoulders, Back, Chest, Arms, Elbows, Hands, Sacrum. Buttock, Coccyx, Ischium, and Legs. Feet and Heels        Does the Patient have a Wound? No noted wound(s)  Redness (blanchable) - buttocks  Ecchymosis - scattered       Marcos Prevention initiated by RN: Yes  Wound Care Orders initiated by RN: No    Pressure Injury (Stage 3,4, Unstageable, DTI, NWPT, and Complex wounds) if present, place Wound referral order by RN under : No    New Ostomies, if present place, Ostomy referral order under : No     Nurse 1 eSignature: Electronically signed by Madelyn Luna RN on 9/24/24 at 10:07 PM EDT    **SHARE this note so that the co-signing nurse can place an eSignature**    Nurse 2 eSignature: Electronically signed by Jean Maya RN on 9/25/24 at 5:00 AM EDT    Patient Belongings: at bedside  Undergarments   Shirt/pants  Footwear/socks    Sent home with [employer]: Pt purse and jewelry (rings)

## 2024-09-25 NOTE — CONSULTS
Ph: [phone], Fax: [phone]           [facility]               Reason for admission:                 Weakness    Brief Summary :     Stephania Sims is being seen by nephrology for hyponatremia.  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 due to SIADH especially given that her sodium came down to after the fluids    Plan:  Hold off on fluid  Check urine sodium  Ordered  Will give a dose of urea today and monitor                     Assessment :     Hyponatremia  Sodium 128 on arrival  126 at the time of consult sodium was on the low side on 11/23 hospitalization low on 5/23, also on 8/22 1/8/1934  Appears to have chronic hypertension  Denies smoking alcoholism, cancer  Was on lisinopril at home which is rarely associated with hyponatremia    Urine sodium- pending  CT abdomen and pelvis on 9/24-no evidence of mass   No abnormality per x-ray chest    UA-moderate bilirubin    Hypertension   BP: (151-175)/(81-90)  Pulse:  [68-71]   BP goal inpatient 130-140 systolic inpatient  Also has atrial fibrillation               [facility] would like to thank Carroll Wood MD   for opportunity to serve this patient      Please call with questions at-   24 Hrs Answering service [phone] or  7 am- 5 pm via Perfect serve or cell phone  Dr.Sudhir Nelia MD       HPI :     Stephania Sims is a 84 y.o. female presented to   the hospital on 9/24/2024 with known history of tremor, A-fib hypertension was seen in [facility] with weakness.  Also complaining of blood in the urine.  She is thought to have UTI for which she is getting treatment.  She is also found to have hyponatremia because of which we are consulted she got IV fluids 500 mL on arrival  Also has abnormal LFT for which she is getting workup for choledocholithiasis.  Has  hypertension and atrial fibrillation      PMH/PSH/SH/Family History:     Past Medical History:   Diagnosis Date    A-fib (HCC)     Arthritis     Basal cell carcinoma of skin of trunk 05/2011    back    Benign familial tremor     sxs began in HS    Decreased bone density     Hip fracture (HCC) 02/2012    sustained in fall    Hypertension     Right inguinal hernia           Medication:     Current Facility-Administered Medications: apixaban (ELIQUIS) tablet 2.5 mg, 2.5 mg, Oral, BID  lisinopril (PRINIVIL;ZESTRIL) tablet 2.5 mg, 2.5 mg, Oral, Daily  propranolol (INDERAL LA) extended release capsule 120 mg, 120 mg, Oral, Daily  sodium chloride flush 0.9 % injection 5-40 mL, 5-40 mL, IntraVENous, 2 times per day  sodium chloride flush 0.9 % injection 5-40 mL, 5-40 mL, IntraVENous, PRN  0.9 % sodium chloride infusion, , IntraVENous, PRN  potassium chloride (KLOR-CON M) extended release tablet 40 mEq, 40 mEq, Oral, PRN **OR** potassium bicarb-citric acid (EFFER-K) effervescent tablet 40 mEq, 40 mEq, Oral, PRN **OR** potassium chloride 10 mEq/100 mL IVPB (Peripheral Line), 10 mEq, IntraVENous, PRN  magnesium sulfate 2000 mg in 50 mL IVPB premix, 2,000 mg, IntraVENous, PRN  ondansetron (ZOFRAN-ODT) disintegrating tablet 4 mg, 4 mg, Oral, Q8H PRN **OR** ondansetron (ZOFRAN) injection 4 mg, 4 mg, IntraVENous, Q6H PRN  polyethylene glycol (GLYCOLAX) packet 17 g, 17 g, Oral, Daily PRN  acetaminophen (TYLENOL) tablet 650 mg, 650 mg, Oral, Q6H PRN **OR** acetaminophen (TYLENOL) suppository 650 mg, 650 mg, Rectal, Q6H PRN    Vitals :     Vitals:    09/25/24 1342   BP: (!) 175/90   Pulse: 70   Resp: 18   Temp: 97.6 °F (36.4 °C)   SpO2: 97%          Physical Examination :     appearance: Alert, orientated has tremor on exertion  Respiratory: no distress  Cardiovascular: no visibly  raised JVD, Edema none  Abdomen: -  soft  Other relevant findings: -      Labs :     CBC:   Recent Labs     09/24/24  1319 09/25/24  0425   WBC 7.8 5.3    HGB 13.6 12.6   HCT 41.1 38.5    143     BMP:    Recent Labs     09/24/24  1319 09/25/24  0425   * 126*   K 4.3 4.1   CL 90* 93*   CO2 31 24   BUN 18 13   CREATININE 0.9 0.8   GLUCOSE 115* 88     Lab Results   Component Value Date/Time    COLORU Yellow 09/24/2024 01:19 PM    NITRU Negative 09/24/2024 01:19 PM    GLUCOSEU Negative 09/24/2024 01:19 PM    GLUCOSEU NEGATIVE 02/12/2012 02:43 PM    KETUA Negative 09/24/2024 01:19 PM    UROBILINOGEN 2.0 09/24/2024 01:19 PM    BILIRUBINUR MODERATE 09/24/2024 01:19 PM        ----------------------------------------------------------  Please call with questions at      24 Hrs Answering service [phone]  Perfect serve, or cell phone 7 am - 5pm  Gutierrez Pickesn MD   [facility].[employer]

## 2024-09-25 NOTE — CONSULTS
Thanks for consulting [facility] for the care of Stephania Sims.    Urine lytes ordered   Also serum osm       Full consult will follow  Please call with questions at-    24 Hrs Answering service [phone]  Perfect serve, or cell phone 7 am - 5pm  MD [facility]  [facility]

## 2024-09-25 NOTE — PROGRESS NOTES
Occupational Therapy  Facility/Department: [facility] C3 TELE/MED SURG/ONC  Occupational Therapy Initial Assessment, Treatment, and Discharge    Name: Stephania Sims  : 1940  MRN: [id number]  Date of Service: 2024    Discharge Recommendations:  Home with assist PRN  OT Equipment Recommendations  Equipment Needed: No       Patient Diagnosis(es): The primary encounter diagnosis was Transaminitis. Diagnoses of Hyponatremia, Elevated INR, Calculus of gallbladder with biliary obstruction but without cholecystitis, and Generalized weakness were also pertinent to this visit.  Past Medical History:  has a past medical history of A-fib (HCC), Arthritis, Basal cell carcinoma of skin of trunk, Benign familial tremor, Decreased bone density, Hip fracture (HCC), Hypertension, and Right inguinal hernia.  Past Surgical History:  has a past surgical history that includes Tonsillectomy (age 7); Tubal ligation; other surgical history (10/2011 approx); Dental surgery (2013); Skin cancer excision (2015); joint replacement (14); hernia repair (Right, 4/10/2019); Forearm surgery (Left, 2023); and Wrist fracture surgery (Right, 2023).           Assessment  Assessment: Pt seated in chair at start of session. Pt tolerates OT evaluation/ treatment session well. Pt is a(n) 84 year old female who presents to [facility] with weakness. Pt reports being independent with ADLs, IADLs, and functional mobility at baseline. Pt completes functional transfers and mobility with supervision progressing to independent with no AD. Pt declines ALD needs this date d/t needing her items - pt with tremors and uses adaptive items to assist with independence. Pt educated on energy conservation techniques and importance of increased activity/ OOB to increase strength. OT recommends home with PRN assist. Pt reports no acute OT needs at this time. Will d/c from OT.   Decision Making: Low Complexity  No Skilled OT: Independent with functional    Observation/Palpation  Posture: Good  Observation: Increased resting tremors in BUE  Safety Devices  Type of Devices: All fall risk precautions in place;Call light within reach;Chair alarm in place;Gait belt;Left in chair;Nurse notified  Restraints  Restraints Initially in Place: No    Bed Mobility Training  In chair at start and end of session  Balance  Sitting: Intact  Standing: Impaired  Standing - Static: Good  Standing - Dynamic: Fair   Transfer Training  Transfer Training: Yes  Interventions: Verbal cues  Sit to Stand: Supervision  Stand to Sit: Supervision  Bed to Chair: Supervision progressing to independent (without AD, chair > toilet > chair)  Toilet transfer: Independent     AROM: Within functional limits  PROM: Within functional limits  Strength: Within functional limits  ADL  Functional Mobility: Supervision;Increased time to complete  Functional Mobility Skilled Clinical Factors: supervision to/from bathroom, no AD  Additional Comments: Declines ADLs this date     Activity Tolerance  Activity Tolerance: Patient tolerated evaluation without incident;Patient tolerated treatment well        Vision  Vision: Impaired  Vision Exceptions: Wears glasses for reading  Hearing  Hearing: Within functional limits  Cognition  Overall Cognitive Status: WNL  Orientation  Overall Orientation Status: Within Normal Limits  Orientation Level: Oriented X4                  Education Given To: Patient  Education Provided: Role of Therapy;Transfer Training;Plan of Care;Precautions;Orientation;Mobility Training;Energy Conservation;Equipment  Education Provided Comments: Pt educated on importance of OOB mobility, prevention of complications of bedrest, and general safety during hospitalization  Education Method: Demonstration;Verbal  Barriers to Learning: None  Education Outcome: Verbalized understanding;Demonstrated understanding                 AM-PAC - ADL  AM-PAC Daily Activity - Inpatient   How much help is needed for  putting on and taking off regular lower body clothing?: None  How much help is needed for bathing (which includes washing, rinsing, drying)?: None  How much help is needed for toileting (which includes using toilet, bedpan, or urinal)?: None  How much help is needed for putting on and taking off regular upper body clothing?: None  How much help is needed for taking care of personal grooming?: None  How much help for eating meals?: None  AM-[facility] Inpatient Daily Activity Raw Score: 24  AM-PAC Inpatient ADL T-Scale Score : 57.54  ADL Inpatient CMS 0-100% Score: 0  ADL Inpatient CMS G-Code Modifier : CH    Goals  Short Term Goals  Time Frame for Short Term Goals: 9/25/24  Short Term Goal 1: Perform toilet transfer independently - GOAL MET  Patient Goals   Patient goals : to go home      Therapy Time   Individual Concurrent Group Co-treatment   Time In 1034         Time Out 1054         Minutes 20         Timed Code Treatment Minutes: 10 Minutes (10 min eval)       Sary Mccullough, OT

## 2024-09-25 NOTE — PROGRESS NOTES
Patient admitted to Room 321 from ED. Accompanied by her son - Adarsh.   Placed on bed with HOB slightly elevated; raised side rails x2.  Patient alert and oriented/conversant. Oriented to the room set up.   Initial assessment done. Vital Signs taken and recorded. Skin assessment done.   Patient belongings accounted for with Jean RN - pt purse and rings sent home with he son adarsh. Denies any pain. Noted with tremors on her head and upper extremities. Patent PIV line on her L AC - hooked with NS at 50 cc/hr.   Call light in reach; Bed alarm placed; Denies any needs at the moment.  Will continue to monitor.

## 2024-09-25 NOTE — CONSULTS
Consultation Note    Patient Name: Stephania Sims  : 1940  Age: 84 y.o.     Admitting Physician: Carroll Wood MD   Date of Admission: 2024 12:09 PM   Primary Care Physician: Azeem Sterling MD        Stephania Sims is being seen at the request of Carroll Wood MD for elevated liver function tests.    History of Present Illness:  This 84-year-old woman with a history of A-fib arthritis hypertension, essential tremors presented with generalized weakness.  When in the hospital she was noted to have elevated liver function tests.  She denies any significant alcohol history there is no family history of liver disease she never done IV drugs her weight has been stable she is never been obese has never had yellow jaundice or hepatitis.  She denies any abdominal pain fever or chills.  Review of her lab work shows similar elevations of her liver tests dating back to the fall .  CT scan shows a normal-appearing liver cholelithiasis with no evidence of cholecystitis.  Ultrasound suggests a 9 mm stone in the common bile duct.    GI History:  none     Past Medical History:  Past Medical History:   Diagnosis Date    A-fib (HCC)     Arthritis     Basal cell carcinoma of skin of trunk 2011    back    Benign familial tremor     sxs began in HS    Decreased bone density     Hip fracture (HCC) 2012    sustained in fall    Hypertension     Right inguinal hernia         Past Surgical History:  Past Surgical History:   Procedure Laterality Date    DENTAL SURGERY  2013    FOREARM SURGERY Left 2023    OPEN REDUCTION AND INTERNAL FIXATION LEFT DISTAL RADIUS FRACTURE performed by Willie Rockwell MD at [facility] OR    HERNIA REPAIR Right 4/10/2019    DAVINCI RIGHT INGUINAL HERNIA REPAIR performed by Chuy Hardy MD at [facility] OR    JOINT REPLACEMENT  12    right bipolar hip replacement    OTHER SURGICAL HISTORY  10/2011 approx    mole removal from back    SKIN CANCER EXCISION  2015    squamous  (KLOR-CON M) extended release tablet 40 mEq, 40 mEq, Oral, PRN **OR** potassium bicarb-citric acid (EFFER-K) effervescent tablet 40 mEq, 40 mEq, Oral, PRN **OR** potassium chloride 10 mEq/100 mL IVPB (Peripheral Line), 10 mEq, IntraVENous, PRN  magnesium sulfate 2000 mg in 50 mL IVPB premix, 2,000 mg, IntraVENous, PRN  ondansetron (ZOFRAN-ODT) disintegrating tablet 4 mg, 4 mg, Oral, Q8H PRN **OR** ondansetron (ZOFRAN) injection 4 mg, 4 mg, IntraVENous, Q6H PRN  polyethylene glycol (GLYCOLAX) packet 17 g, 17 g, Oral, Daily PRN  acetaminophen (TYLENOL) tablet 650 mg, 650 mg, Oral, Q6H PRN **OR** acetaminophen (TYLENOL) suppository 650 mg, 650 mg, Rectal, Q6H PRN     Social History:   Social History       Tobacco History       Smoking Status  Former Smoking Start Date  1960 Quit Date  1962 Average Packs/Day  0.3 packs/day for 2.0 years (0.5 ttl pk-yrs) Smoking Tobacco Type  Cigarettes from 1960 to 1962   Pack Year History     Packs/Day From To Years    0 1962  62.7    0.25 1960 1962 2.0      Smokeless Tobacco Use  Never      Tobacco Comments  smoked for 2 years when just out of high school              Alcohol History       Alcohol Use Status  No              Drug Use       Drug Use Status  Never              Sexual Activity       Sexually Active  Not Currently Partners  Male Comment   passed away 10/2022                     Family History:  Family History   Problem Relation Age of Onset    High Blood Pressure Mother     Arthritis Mother     Parkinsonism Mother         Parkinson's Disease    Heart Disease Father     High Blood Pressure Father     Cancer Sister         breast & bladder carcinoma    High Blood Pressure Sister     Other Sister         benign familial tremor    Cancer Sister         brain    Cancer Sister         breast    Other Brother         benign familial tremor    High Blood Pressure Son     Diabetes Paternal Grandfather         Allergies:  Allergies   Allergen Reactions    Codeine Nausea And  quadrant ascites.  Impression: 1. Unchanged 9 mm choledocholithiasis; correlate with ERCP.       Labs:   Recent Labs     09/24/24  1319 09/25/24  0425 09/25/24  1619   WBC 7.8 5.3  --    HGB 13.6 12.6  --     143  --    ALKPHOS 204* 162*  --    * 250*  --    * 118*  --    BILITOT 1.9* 1.1*  --    BILIDIR  --  0.5*  --    IBILI  --  0.6  --    BUN 18 13  --    CREATININE 0.9 0.8  --    * 126* 131*   K 4.3 4.1  --    INR 1.58*  --   --         Assessment:    Hospital Problems             Last Modified POA    * (Principal) General weakness 9/24/2024 Yes       84-year-old woman presenting with general fatigue found to have elevated liver function tests and obstructive pattern with cholelithiasis and possible choledocholithiasis.    Plan:  MRCP  Follow liver function tests  Your supportive care.      Ethan Dodson, DO    [facility]    [phone]. Also available via Perfect Serve    Please note that some or all of this record was generated using voice recognition software. If there are any questions about the content of this document, please contact the author as some errors in translation may have occurred.

## 2024-09-25 NOTE — PROGRESS NOTES
Hospital Medicine Progress Note      Date of Admission: 9/24/2024  Hospital Day: 2    Chief Admission Complaint:    Chief Complaint   Patient presents with    Urinary Retention     Went to PCP today for concern for UTI; 2x episodes of of dark colored urine.   Patient had 500 mL of PO fluid at PCP and only had diarrhea. Sent to ED for evaluation.      Subjective:      Na down to 126 today. Pt tells me she feels better and wants to go home. Discussed lab findings with her and need for continued admission given hyponatremia and abnormal LFTs. Awaiting GI and nephrology eval.     Presenting Admission History:       84 y.o. female with hx of tremors, afib, htn who presented to [facility] with weakness. Pt was noted to have difficulty getting off couch yesterday evening. Son in law came over and helped her up and into bed and spent the night watching over.  Pt had some rt knee pain at the time and thought this may have contributed.  She went to [facility] due to seeing brown colored urine and was told to come to ER given concerns for blood.  Of note, 1-2 weeks ago, pt was again seen at urgent care for possible UTI and was given abx(completed course) at the time.  -she feels her appetite has been ok  -she noted some chills but no recorded fever  -No n/v  -noted some diarrhea  -No sob/cp/cough  -No abd pain           Assessment/Plan:       Current Principal Problem:  General weakness     Generalized weakness possibly 2/2 hyponatremia (128 on arrival). Suspect could be due to hypovolemia  - UA neg for infection, cxr unremarkable  - Challenged with IVFs, worsened  - Nephrology consulted, appreciate their input  - PT/OT      Transaminitis, unclear etiology  - CT abd/pel done with no acute abdominopelvic abnormality noted  - Hepatitis panel non-reactive  - Will defer additional testing required to them  - NPO pending GI eval  - GI consulted, appreciate their input     Afib-rate controlled  - On adjusted dose  electrolyte abnormalities requiring IV replacement and close serial monitoring  [] Insulin - monitoring serial FSBS for Hypoglycemic adverse drug reaction  [] Anticoagulation requiring serial monitoring of coagulation factors  [] Other -   [] Change in code status:    [] Decision to escalate care:    [] Major surgery/procedure with associated risk factors:    ----------------------------------------------------------------------  C. Data (any 2)  [x] Discussed current management and discharge planning options with Case Management.  [x] Discussed management of the case with:    [] Telemetry personally reviewed and interpreted as documented above    [x] Imaging personally reviewed and interpreted, includes:    [x] Data Review (any 3)  [x] All available Consultant notes from yesterday/today were reviewed  [x] All current labs were reviewed and interpreted for clinical significance   [x] Appropriate follow-up labs were ordered  [x] Collateral history obtained from:  pt, review of emr      Medications:  Personally reviewed in detail in conjunction w/ labs as documented for evidence of drug toxicity.     Infusion Medications    sodium chloride       Scheduled Medications    apixaban  2.5 mg Oral BID    lisinopril  2.5 mg Oral Daily    propranolol  120 mg Oral Daily    sodium chloride flush  5-40 mL IntraVENous 2 times per day     PRN Meds: sodium chloride flush, sodium chloride, potassium chloride **OR** potassium alternative oral replacement **OR** potassium chloride, magnesium sulfate, ondansetron **OR** ondansetron, polyethylene glycol, acetaminophen **OR** acetaminophen     Labs:  Personally reviewed and interpreted for clinical significance.     Recent Labs     09/24/24  1319 09/25/24  0425   WBC 7.8 5.3   HGB 13.6 12.6   HCT 41.1 38.5    143     Recent Labs     09/24/24  1319 09/25/24  0425   * 126*   K 4.3 4.1   CL 90* 93*   CO2 31 24   BUN 18 13   CREATININE 0.9 0.8   CALCIUM 9.3 8.5     Recent Labs

## 2024-09-25 NOTE — PLAN OF CARE
Problem: Pain  Goal: Verbalizes/displays adequate comfort level or baseline comfort level  Outcome: Progressing  Flowsheets (Taken 9/24/2024 2205)  Verbalizes/displays adequate comfort level or baseline comfort level:   Encourage patient to monitor pain and request assistance   Assess pain using appropriate pain scale   Administer analgesics based on type and severity of pain and evaluate response   Implement non-pharmacological measures as appropriate and evaluate response     Problem: ABCDS Injury Assessment  Goal: Absence of physical injury  Outcome: Progressing  Flowsheets (Taken 9/24/2024 2205)  Absence of Physical Injury: Implement safety measures based on patient assessment     Problem: Neurosensory - Adult  Goal: Achieves stable or improved neurological status  Outcome: Progressing  Flowsheets (Taken 9/24/2024 2205)  Achieves stable or improved neurological status:   Assess for and report changes in neurological status   Initiate measures to prevent increased intracranial pressure   Maintain blood pressure and fluid volume within ordered parameters to optimize cerebral perfusion and minimize risk of hemorrhage   Monitor temperature, glucose, and sodium. Initiate appropriate interventions as ordered     Problem: Neurosensory - Adult  Goal: Achieves maximal functionality and self care  Outcome: Progressing  Flowsheets (Taken 9/24/2024 2205)  Achieves maximal functionality and self care:   Monitor swallowing and airway patency with patient fatigue and changes in neurological status   Encourage and assist patient to increase activity and self care with guidance from physical therapy/occupational therapy   Encourage visually impaired, hearing impaired and aphasic patients to use assistive/communication devices     Problem: Skin/Tissue Integrity - Adult  Goal: Skin integrity remains intact  Outcome: Progressing  Flowsheets (Taken 9/24/2024 2205)  Skin Integrity Remains Intact:   Monitor for areas of redness  and/or skin breakdown   Assess vascular access sites hourly   Every 4-6 hours minimum: Change oxygen saturation probe site   Every 4-6 hours: If on nasal continuous positive airway pressure, respiratory therapy assesses nares and determine need for appliance change or resting period     Problem: Musculoskeletal - Adult  Goal: Return mobility to safest level of function  Outcome: Progressing  Flowsheets (Taken 9/24/2024 2205)  Return Mobility to Safest Level of Function:   Assess patient stability and activity tolerance for standing, transferring and ambulating with or without assistive devices   Assist with transfers and ambulation using safe patient handling equipment as needed   Ensure adequate protection for wounds/incisions during mobilization   Obtain physical therapy/occupational therapy consults as needed     Problem: Discharge Planning  Goal: Discharge to home or other facility with appropriate resources  Outcome: Progressing  Flowsheets (Taken 9/24/2024 2205)  Discharge to home or other facility with appropriate resources:   Arrange for needed discharge resources and transportation as appropriate   Identify discharge learning needs (meds, wound care, etc)   Identify barriers to discharge with patient and caregiver

## 2024-09-25 NOTE — PROGRESS NOTES
Physical Therapy  Facility/Department: [facility] C3 TELE/MED SURG/ONC  Physical Therapy Initial Assessment/Discharge Summary (1x only)    Name: Stephania Sims  : 1940  MRN: [id number]  Date of Service: 2024    Discharge Recommendations:  Home with assist PRN   PT Equipment Recommendations  Equipment Needed: No      Patient Diagnosis(es): The primary encounter diagnosis was Transaminitis. Diagnoses of Hyponatremia, Elevated INR, Calculus of gallbladder with biliary obstruction but without cholecystitis, and Generalized weakness were also pertinent to this visit.  Past Medical History:  has a past medical history of A-fib (HCC), Arthritis, Basal cell carcinoma of skin of trunk, Benign familial tremor, Decreased bone density, Hip fracture (HCC), Hypertension, and Right inguinal hernia.  Past Surgical History:  has a past surgical history that includes Tonsillectomy (age 7); Tubal ligation; other surgical history (10/2011 approx); Dental surgery (2013); Skin cancer excision (2015); joint replacement (14); hernia repair (Right, 4/10/2019); Forearm surgery (Left, 2023); and Wrist fracture surgery (Right, 2023).    Assessment  Body Structures, Functions, Activity Limitations Requiring Skilled Therapeutic Intervention: Decreased balance  Assessment: Pt referred for PT evaluation during current hospital stay with dx of generalized weakness and hyponatremia.  Pt lives alone at baseline in a 2-story home and typically performs ADL's, homemaking, and all mobility without AD.  Pt currently functioning at baseline, demonstrating (I) or near-(I) with all functional mobility tasks including gait and stairs.  Pt with some mild degree of unsteadiness at time during gait training, although states this is normally how she walks.  Pt feels she's ambulating as she does at baseline and denies further acute PT needs at this time; will sign off.  Recommend pt return home with assist PRN from family.  Therapy  Assistance: Independent  Active : No  Patient's  Info: Son or daughter  Mode of Transportation: Family  Occupation: Retired  Leisure & Hobbies: Taking walks, spending time her dog \"[place]\"    Vision/Hearing  Vision  Vision: Impaired  Vision Exceptions: Wears glasses for reading  Hearing  Hearing: Within functional limits      Cognition   Orientation  Overall Orientation Status: Within Normal Limits    Objective  Vitals  Temp: 97.5 °F (36.4 °C)  Pulse: 71  Heart Rate Source: Monitor  Respirations: 16  SpO2: 95 %  O2 Device: None (Room air)  BP: (!) 155/81  MAP (Calculated): 106  BP Location: Left upper arm  BP Method: Automatic  Patient Position: Semi fowlers    Observation/Palpation  Posture: Good  Observation: Increased resting tremors in BUE    Gross Assessment  AROM: Within functional limits  PROM: Within functional limits  Strength: Within functional limits  Coordination: Generally decreased, functional  Tone: Normal    Bed Mobility Training  Supine to Sit: Independent  Scooting: Independent (to scoot forward to EOB)    Balance  Sitting: Intact  Standing: Impaired  Standing - Static: Good  Standing - Dynamic: Fair (no overt LOB but occasional bouts of postural sway/mild unsteadiness with amb)    Transfer Training  Interventions: Verbal cues  Sit to Stand: Supervision  Stand to Sit: Supervision  Bed to Chair: Supervision (without AD)    Gait Training  Overall Level of Assistance: Stand-by assistance  Distance (ft): 200 Feet  Assistive Device: Gait belt;None  Interventions: Verbal cues  Base of Support: Narrowed  Speed/Irma: Delayed  Step Length: Left shortened;Right shortened  Swing Pattern: Left symmetrical;Right symmetrical  Gait Abnormalities: Decreased step clearance;Trunk sway increased (slight med<>lat trunk sway, mild unsteadiness although this lessened as amb progressed, no overt episodes of postural sway or LOB)    Stair Training  Rail Use: Both  Stairs - Level of Assistance: Stand-by  0824         Minutes 34         Timed Code Treatment Minutes: 24 Minutes (10 minute eval + 24 minutes treatment)       Lien Carrasco, PT, DPT #[id number]

## 2024-09-25 NOTE — CONSULTS
Consult Call Back    Who:Gutierrez Pickens MD   Date:9/25/2024,  Time:11:31 AM    Electronically signed by Tawny Knight on 9/25/24 at 11:31 AM EDT

## 2024-09-25 NOTE — CARE COORDINATION
Case Management Assessment  Initial Evaluation    Date/Time of Evaluation: 9/25/2024 9:41 AM  Assessment Completed by: ELEANOR Vanegas    If patient is discharged prior to next notation, then this note serves as note for discharge by case management.    Patient Name: Stephania Peters                   YOB: 1940  Diagnosis: Hyponatremia [E87.1]  Transaminitis [R74.01]  Elevated INR [R79.1]  General weakness [R53.1]  Generalized weakness [R53.1]  Calculus of gallbladder with biliary obstruction but without cholecystitis [K80.21]                   Date / Time: 9/24/2024 12:09 PM    Patient Admission Status: Inpatient   Readmission Risk (Low < 19, Mod (19-27), High > 27): Readmission Risk Score: 11.6    Current PCP: Azeem Sterling MD  PCP verified by CM? Yes    Chart Reviewed: Yes      History Provided by: Patient  Patient Orientation: Alert and Oriented, Person, Situation, Place, Self    Patient Cognition: Alert    Hospitalization in the last 30 days (Readmission):  No      Advance Directives:      Code Status: Full Code   Patient's Primary Decision Maker is: Legal Next of Kin    Primary Decision Maker: adarsh peters - Child - [phone]    Primary Decision Maker (Active): NONE,NONE - Spouse - [phone]    Discharge Planning:    Patient lives with: Alone Type of Home: House  Primary Care Giver: Self  Patient Support Systems include: Children, Family Members   Current Financial resources: Medicare  Current community resources: None  Current services prior to admission: None            Type of Home Care services:  None    ADLS  Prior functional level: Independent in ADLs/IADLs  Current functional level: Independent in ADLs/IADLs    PT AM-PAC: 24 /24  OT AM-PAC:   /24    Family can provide assistance at DC: Yes  Would you like Case Management to discuss the discharge plan with any other family members/significant others, and if so, who? Yes (Ney)  Plans to Return to Present Housing:  Yes  Potential Assistance needed at discharge: N/A  Patient expects to discharge to: House  Plan for transportation at discharge: Self    Financial    Payor: HU[employer] MEDICARE / Plan: HU[employer] GOLD PLUS HMO / Product Type: *No Product type* /     Does insurance require precert for SNF: Yes    Potential assistance Purchasing Medications: No  Meds-to-Beds request: Yes      [id number] - [place], [place] - [address] - P [phone] - F [phone]  [address]   [facility] [address]  Phone: [phone] Fax: [phone]    [facility] [address] [place], [place] - [address] - P [phone] - F [phone]  [address] [address]  Phone: [phone] Fax: [phone]    [facility] [id number] [facility] [address] - P [phone] - F [phone]  [address] [address]  Phone: [phone] Fax: [phone]      Notes:    Factors facilitating achievement of predicted outcomes: Family support, Motivated, Cooperative, Pleasant, and Good insight into deficits    Barriers to discharge: Medical complications    Additional Case Management Notes: Patient reports lives at home with her dog, 1 ST, and 2nf floor bedroom. Patient reports IPTA family supports transportation needs. Seen by therapy recs home with PRN, Patient reports feeling at baseline denies concerns. Writer attempted to call son Ney [phone] no answer, VM full unable to leave message.     The Plan for Transition of Care is related to the following treatment goals of Hyponatremia [E87.1]  Transaminitis [R74.01]  Elevated INR [R79.1]  General weakness [R53.1]  Generalized weakness [R53.1]  Calculus of gallbladder with biliary obstruction but without cholecystitis [K80.21]    IF APPLICABLE: The Patient and/or patient representative Stephania and her family were provided with a choice of provider and agrees with the discharge plan. Freedom of choice list with  basic dialogue that supports the patient's individualized plan of care/goals and shares the quality data associated with the providers was provided to: Patient       The Patient and/or Patient Representative Agree with the Discharge Plan? Yes    ELEANOR Vanegas  Case Management Department  Ph: [phone] Fax: [phone]

## 2024-09-26 VITALS
TEMPERATURE: 97.6 F | WEIGHT: 121.5 LBS | DIASTOLIC BLOOD PRESSURE: 81 MMHG | SYSTOLIC BLOOD PRESSURE: 156 MMHG | BODY MASS INDEX: 20.24 KG/M2 | HEIGHT: 65 IN | HEART RATE: 60 BPM | RESPIRATION RATE: 16 BRPM | OXYGEN SATURATION: 92 %

## 2024-09-26 DIAGNOSIS — R79.89 ELEVATED LFTS: Primary | ICD-10-CM

## 2024-09-26 LAB
ALBUMIN SERPL-MCNC: 3.6 G/DL (ref 3.4–5)
ALP SERPL-CCNC: 167 U/L (ref 40–129)
ALT SERPL-CCNC: 184 U/L (ref 10–40)
ANION GAP SERPL CALCULATED.3IONS-SCNC: 9 MMOL/L (ref 3–16)
AST SERPL-CCNC: 59 U/L (ref 15–37)
BASOPHILS # BLD: 0.1 K/UL (ref 0–0.2)
BASOPHILS NFR BLD: 0.9 %
BILIRUB DIRECT SERPL-MCNC: 0.3 MG/DL (ref 0–0.3)
BILIRUB INDIRECT SERPL-MCNC: 0.3 MG/DL (ref 0–1)
BILIRUB SERPL-MCNC: 0.6 MG/DL (ref 0–1)
BUN SERPL-MCNC: 29 MG/DL (ref 7–20)
CALCIUM SERPL-MCNC: 9 MG/DL (ref 8.3–10.6)
CHLORIDE SERPL-SCNC: 97 MMOL/L (ref 99–110)
CO2 SERPL-SCNC: 27 MMOL/L (ref 21–32)
CREAT SERPL-MCNC: 0.8 MG/DL (ref 0.6–1.2)
DEPRECATED RDW RBC AUTO: 14.1 % (ref 12.4–15.4)
EOSINOPHIL # BLD: 0.1 K/UL (ref 0–0.6)
EOSINOPHIL NFR BLD: 1.4 %
GFR SERPLBLD CREATININE-BSD FMLA CKD-EPI: 73 ML/MIN/{1.73_M2}
GLUCOSE SERPL-MCNC: 96 MG/DL (ref 70–99)
HCT VFR BLD AUTO: 38.3 % (ref 36–48)
HGB BLD-MCNC: 13.1 G/DL (ref 12–16)
LYMPHOCYTES # BLD: 2.1 K/UL (ref 1–5.1)
LYMPHOCYTES NFR BLD: 34.6 %
MCH RBC QN AUTO: 32.5 PG (ref 26–34)
MCHC RBC AUTO-ENTMCNC: 34.2 G/DL (ref 31–36)
MCV RBC AUTO: 95 FL (ref 80–100)
MONOCYTES # BLD: 1.1 K/UL (ref 0–1.3)
MONOCYTES NFR BLD: 18.4 %
NEUTROPHILS # BLD: 2.7 K/UL (ref 1.7–7.7)
NEUTROPHILS NFR BLD: 44.7 %
OSMOLALITY UR: 517 MOSM/KG (ref 390–1070)
PLATELET # BLD AUTO: 148 K/UL (ref 135–450)
PMV BLD AUTO: 9.1 FL (ref 5–10.5)
POTASSIUM SERPL-SCNC: 4.2 MMOL/L (ref 3.5–5.1)
PROT SERPL-MCNC: 5.8 G/DL (ref 6.4–8.2)
RBC # BLD AUTO: 4.03 M/UL (ref 4–5.2)
SODIUM SERPL-SCNC: 133 MMOL/L (ref 136–145)
WBC # BLD AUTO: 6 K/UL (ref 4–11)

## 2024-09-26 PROCEDURE — 85025 COMPLETE CBC W/AUTO DIFF WBC: CPT

## 2024-09-26 PROCEDURE — 6370000000 HC RX 637 (ALT 250 FOR IP): Performed by: INTERNAL MEDICINE

## 2024-09-26 PROCEDURE — 36415 COLL VENOUS BLD VENIPUNCTURE: CPT

## 2024-09-26 PROCEDURE — 80048 BASIC METABOLIC PNL TOTAL CA: CPT

## 2024-09-26 PROCEDURE — 2580000003 HC RX 258: Performed by: INTERNAL MEDICINE

## 2024-09-26 PROCEDURE — 80076 HEPATIC FUNCTION PANEL: CPT

## 2024-09-26 RX ORDER — LISINOPRIL 10 MG/1
10 TABLET ORAL DAILY
Qty: 30 TABLET | Refills: 1 | Status: SHIPPED | OUTPATIENT
Start: 2024-09-27

## 2024-09-26 RX ORDER — SODIUM CHLORIDE 1 G/1
1 TABLET ORAL 2 TIMES DAILY
Qty: 60 TABLET | Refills: 0 | Status: SHIPPED | OUTPATIENT
Start: 2024-09-26

## 2024-09-26 RX ADMIN — Medication 15 G: at 10:08

## 2024-09-26 RX ADMIN — LISINOPRIL 2.5 MG: 2.5 TABLET ORAL at 10:08

## 2024-09-26 RX ADMIN — PROPRANOLOL HYDROCHLORIDE 120 MG: 60 CAPSULE, EXTENDED RELEASE ORAL at 10:08

## 2024-09-26 RX ADMIN — SODIUM CHLORIDE, PRESERVATIVE FREE 10 ML: 5 INJECTION INTRAVENOUS at 10:09

## 2024-09-26 NOTE — PROGRESS NOTES
Received patient in bed, alert and oriented x4, V/S taken and recorded.   Shift assessment done as charted.     - currently on RA and denies any SOB  - patent PIV line on her L AC - on saline locked.   - ambulates with standby assist. Tremors noted on her face and BUE.   - on NPO since MN for plan MRI- MRCP today 9/26.      Bed wheels locked and alarm on; Call light within reach; Raised siderails x2; on Fall precaution. Denies any need at the moment.

## 2024-09-26 NOTE — CONSULTS
Ph: [phone], Fax: [phone]           [facility]               Reason for admission:                 Weakness    Brief Summary :     Stephania Sims is being seen by nephrology for hyponatremia.  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 due to SIADH especially   given that her sodium came down after the fluids  Now responding to urea  Urine sodium was not done at the time of admission  Urine sodium was relatively high at 35 on 9/25/2024  Plan for MRI of the abdomen  Plan for MRI of the abdomen      Plan:  Hold off on fluid  Check urine sodium  Ordered  Will give a dose of urea today and monitor  Continue urea                     Assessment :     Hyponatremia  Sodium 128 on arrival  126 at the time of consult sodium was on the low side on 11/23 hospitalization low on 5/23, also on 8/22 1/8/1934  Appears to have chronic hypertension  Denies smoking alcoholism, cancer  Was on lisinopril at home which is rarely associated with hyponatremia    Urine sodium-35 on 9/25/2024  CT abdomen and pelvis on 9/24-no evidence of mass   No abnormality per x-ray chest    UA-moderate bilirubin    Hypertension   BP: (142)/(77)  Pulse:  [55]   BP goal inpatient 130-140 systolic inpatient  Also has atrial fibrillation               [facility] would like to thank Carroll Wood MD   for opportunity to serve this patient      Please call with questions at-   24 Hrs Answering service [phone] or  7 am- 5 pm via Perfect serve or cell phone  Dr.Sudhir Nelia MD       HPI :     Stephania Sims is a 84 y.o. female presented to   the hospital on 9/24/2024 with known history of tremor, A-fib hypertension was seen in [facility] with weakness.  Also complaining of blood in the urine.  She is thought to have UTI for which she is getting treatment.  She

## 2024-09-26 NOTE — PROGRESS NOTES
Called down to MRI to see when MRCP is scheduled. MRI states that if they get to it, it won't be until this evening. Spoke with family on phone to update. Daughter is wondering if MRI/MRCP can be completed outpatient, as she does not want her mother to be NPO for any more meals.  Secure message sent to hospitalist.

## 2024-09-26 NOTE — PLAN OF CARE
Problem: Pain  Goal: Verbalizes/displays adequate comfort level or baseline comfort level  Outcome: Progressing  Flowsheets (Taken 9/25/2024 1916 by Madelyn Luna RN)  Verbalizes/displays adequate comfort level or baseline comfort level:   Encourage patient to monitor pain and request assistance   Administer analgesics based on type and severity of pain and evaluate response   Assess pain using appropriate pain scale   Implement non-pharmacological measures as appropriate and evaluate response     Problem: Skin/Tissue Integrity  Goal: Absence of new skin breakdown  Description: 1.  Monitor for areas of redness and/or skin breakdown  2.  Assess vascular access sites hourly  3.  Every 4-6 hours minimum:  Change oxygen saturation probe site  4.  Every 4-6 hours:  If on nasal continuous positive airway pressure, respiratory therapy assess nares and determine need for appliance change or resting period.  Outcome: Progressing     Problem: ABCDS Injury Assessment  Goal: Absence of physical injury  Outcome: Progressing  Flowsheets (Taken 9/25/2024 1916 by Madelyn Luna RN)  Absence of Physical Injury: Implement safety measures based on patient assessment     Problem: Neurosensory - Adult  Goal: Achieves stable or improved neurological status  Outcome: Progressing  Flowsheets (Taken 9/25/2024 1916 by Madelyn Luna RN)  Achieves stable or improved neurological status:   Assess for and report changes in neurological status   Initiate measures to prevent increased intracranial pressure   Maintain blood pressure and fluid volume within ordered parameters to optimize cerebral perfusion and minimize risk of hemorrhage   Monitor temperature, glucose, and sodium. Initiate appropriate interventions as ordered  Goal: Absence of seizures  Outcome: Progressing  Flowsheets (Taken 9/26/2024 1119)  Absence of seizures:   Monitor for seizure activity.  If seizure occurs, document type and location of movements and any associated  Skin integrity remains intact  Outcome: Progressing  Flowsheets (Taken 9/26/2024 1117)  Skin Integrity Remains Intact: Monitor for areas of redness and/or skin breakdown  Goal: Incisions, wounds, or drain sites healing without S/S of infection  Outcome: Progressing  Flowsheets (Taken 9/25/2024 1916 by Madelyn Luna, RN)  Incisions, Wounds, or Drain Sites Healing Without Sign and Symptoms of Infection:   TWICE DAILY: Assess and document dressing/incision, wound bed, drain sites and surrounding tissue   TWICE DAILY: Assess and document skin integrity   Implement wound care per orders  Goal: Oral mucous membranes remain intact  Outcome: Progressing  Flowsheets (Taken 9/25/2024 1119)  Oral Mucous Membranes Remain Intact: Assess oral mucosa and hygiene practices     Problem: Musculoskeletal - Adult  Goal: Return mobility to safest level of function  Outcome: Progressing  Flowsheets (Taken 9/25/2024 1916 by Madelyn Luna, RN)  Return Mobility to Safest Level of Function:   Assess patient stability and activity tolerance for standing, transferring and ambulating with or without assistive devices   Assist with transfers and ambulation using safe patient handling equipment as needed   Ensure adequate protection for wounds/incisions during mobilization   Obtain physical therapy/occupational therapy consults as needed  Goal: Maintain proper alignment of affected body part  Outcome: Progressing  Flowsheets (Taken 9/25/2024 1119)  Maintain proper alignment of affected body part: Support and protect limb and body alignment per provider's orders  Goal: Return ADL status to a safe level of function  Outcome: Progressing  Flowsheets (Taken 9/25/2024 1119)  Return ADL Status to a Safe Level of Function: Administer medication as ordered     Problem: Cardiovascular - Adult  Goal: Maintains optimal cardiac output and hemodynamic stability  Outcome: Progressing  Flowsheets (Taken 9/25/2024 1916 by Madelyn Luna, RN)  Maintains optimal  cardiac output and hemodynamic stability:   Monitor blood pressure and heart rate   Monitor urine output and notify Licensed Independent Practitioner for values outside of normal range   Assess for signs of decreased cardiac output   Administer fluid and/or volume expanders as ordered   Administer vasoactive medications as ordered  Goal: Absence of cardiac dysrhythmias or at baseline  Outcome: Progressing  Flowsheets (Taken 9/26/2024 1119)  Absence of cardiac dysrhythmias or at baseline:   Monitor cardiac rate and rhythm   Assess for signs of decreased cardiac output   Administer antiarrhythmia medication and electrolyte replacement as ordered     Problem: Discharge Planning  Goal: Discharge to home or other facility with appropriate resources  Outcome: Progressing  Flowsheets (Taken 9/25/2024 1916 by Madelyn Luna, RN)  Discharge to home or other facility with appropriate resources:   Identify barriers to discharge with patient and caregiver   Arrange for needed discharge resources and transportation as appropriate   Identify discharge learning needs (meds, wound care, etc)     Problem: Safety - Adult  Goal: Free from fall injury  Outcome: Progressing  Flowsheets (Taken 9/26/2024 1119)  Free From Fall Injury: Instruct family/caregiver on patient safety

## 2024-09-26 NOTE — DISCHARGE SUMMARY
Hospital Medicine Discharge Summary    Patient: Stephania Sims   : 1940     Admit Date: 2024   Discharge Date: 2024   Disposition:  [x]Home   []HHC  []SNF  []ECF  []Acute Rehab  []LTAC  []Hospice  Code status:  [x]Full  []DNR/CCA  []Limited (DNR/CCA with Do Not Intubate)  []DNRCC  Condition at Discharge: Stable  Primary Care Provider: Azeem Sterling MD    Admitting Provider: Saulo Salomon MD  Discharge Provider: VELVET Angulo     Discharge Diagnoses:      Active Hospital Problems    Diagnosis     General weakness [R53.1]        Presenting Admission History:       84 y.o. female with hx of tremors, afib, htn who presented to [facility] with weakness. Pt was noted to have difficulty getting off couch yesterday evening. Son in law came over and helped her up and into bed and spent the night watching over.  Pt had some rt knee pain at the time and thought this may have contributed.  She went to [facility] due to seeing brown colored urine and was told to come to ER given concerns for blood.  Of note, 1-2 weeks ago, pt was again seen at urgent care for possible UTI and was given abx(completed course) at the time.  -she feels her appetite has been ok  -she noted some chills but no recorded fever  -No n/v  -noted some diarrhea  -No sob/cp/cough  -No abd pain          Assessment/Plan:       Current Principal Problem:  General weakness     Generalized weakness possibly 2/2 hyponatremia (128 on arrival). Suspect could be due to hypovolemia. IMPROVED.  - UA neg for infection, cxr unremarkable  - Challenged with IVFs, worsened  - Nephrology consulted, appreciate their input  - PT/OT   - Improved on urea packets. Discussed with nephrology - plan for dc on sodium chloride tablets BID and increase lisinopril to 10mg. FU with Dr. Pickens as outpatient.     Transaminitis, unclear etiology. Suspect due to cholecholithiasis, IMPROVING  - CT abd/pel done with no acute abdominopelvic  CT of the abdomen and pelvis was performed with the administration of intravenous contrast. Multiplanar reformatted images are provided for review. Automated exposure control, iterative reconstruction, and/or weight based adjustment of the mA/kV was utilized to reduce the radiation dose to as low as reasonably achievable. COMPARISON: None. HISTORY: ORDERING SYSTEM PROVIDED HISTORY: weak, mild altered mental status, transaminitis, dark urine TECHNOLOGIST PROVIDED HISTORY: Reason for exam:->weak, mild altered mental status, transaminitis, dark urine Additional Contrast?->None Decision Support Exception - unselect if not a suspected or confirmed emergency medical condition->Emergency Medical Condition (MA) Reason for Exam: pt states brown urine started this morning, Relevant Medical/Surgical History: no hx of abdominal surgeries FINDINGS: Lower chest: No suspicious pulmonary nodularity in the included lung bases. Liver: No suspicious hepatic lesions are present. Biliary system/Gallbladder: No intrahepatic or extrahepatic biliary ductal dilation. The gallbladder demonstrates cholelithiasis without evidence of acute cholecystitis. Spleen: Normal. Pancreas: No evidence of pancreatic lesions or pancreatic ductal dilation. Adrenals: No suspicious adrenal nodules are present. Kidneys/Bladder: No evidence of hydronephrosis, nephrolithiasis, or suspicious mass lesion. Evaluation of the bladder is limited by streak artifact from right hip arthroplasty.  There is no gross abnormality identified. Pelvic organs: The uterus is present.  No discrete pelvic mass lesion identified, however evaluation limited by streak artifact. GI tract: The stomach is mildly distended with enteric contents. No evidence of bowel obstruction.  The appendix is visualized and normal in appearance. Peritoneum: No evidence of ascites or peritoneal nodularity. Lymph nodes: No evidence of suspicious lymphadenopathy. Vasculature: Normal. Soft Tissues/Bones:

## 2024-09-26 NOTE — CARE COORDINATION
CASE MANAGEMENT DISCHARGE SUMMARY    Discharge to: Home with PRN family supports     IMM given: (date) 09/242024    New Durable Medical Equipment ordered/agency: NA    Transportation: via private car    RN, name: URBAN Faustin    Note: Discharging nurse to complete THELMA, reconcile AVS, and place final copy with patient's discharge packet.   .ELEANOR Vanegas

## 2024-09-26 NOTE — PROGRESS NOTES
Spoke with Dr. Dodson GI. Pt ok to d/c and f/u outpt for MRCP as long as she can tolerate lunch. Low fat diet ordered. Left message on daughter's vm for call back.

## 2024-09-26 NOTE — PROGRESS NOTES
Progress Note    Patient Stephania Sims  MRN: [id number]  YOB: 1940 Age: 84 y.o. Sex: female  Room: [address] [place] [address]       Admitting Physician: Saulo Salomon MD   Date of Admission: 9/24/2024 12:09 PM   Primary Care Physician: Azeem Sterling MD     Subjective:  Stephania Sims was seen and examined. We are following for elevated liver function tests.  -- Patient's family has requested that the patient be discharged as they do not want to wait until tomorrow to have the MRI performed.  Patient is doing well and tolerating her diet fact she has eaten most of her tray.  Patient denies any abdominal pain fever chills.  Liver function tests have improved.    ROS:  Constitutional: Denies fever, no change in appetite  Respiratory: Denies cough or shortness of breath  Cardiovascular: Denies chest pain or edema    Objective:  Vital Signs:   Vitals:    09/26/24 0957   BP: (!) 156/81   Pulse: 60   Resp: 16   Temp: 97.6 °F (36.4 °C)   SpO2:          Physical Exam:  Constitutional: Alert and oriented x 4. No acute distress.   HEENT: Sclera anicteric, mucosal membranes moist  Cardiovascular: Regular rate and rhythm.  No murmurs.  Respiratory: Respirations nonlabored, no crepitus  GI: Abdomen nondistended, soft, and nontender.  Normal active bowel sounds.  No masses palpable.   Rectal: Deferred  Musculoskeletal:  No pitting edema of the lower legs.  Neurological: Gross memory appears intact.  Tremors present.    Intake/Output:    Intake/Output Summary (Last 24 hours) at 9/26/2024 1718  Last data filed at 9/26/2024 1009  Gross per 24 hour   Intake 220 ml   Output 990 ml   Net -770 ml        Current Medications:  No current facility-administered medications for this encounter.     Current Outpatient Medications   Medication Sig Dispense Refill    [START ON 9/27/2024] lisinopril (PRINIVIL;ZESTRIL) 10 MG tablet Take 1 tablet by mouth daily 30 tablet 1    sodium chloride 1 g tablet Take 1 tablet by mouth in the  morning and at bedtime 60 tablet 0    propranolol (INDERAL LA) 120 MG extended release capsule TAKE 1 CAPSULE BY MOUTH EVERY DAY 90 capsule 3    Cholecalciferol (VITAMIN D) 50 MCG (2000 UT) CAPS capsule Take by mouth      Multiple Vitamins-Minerals (MULTIVITAMIN ADULT PO) Take 1 tablet by mouth daily           Recent Imaging:   US GALLBLADDER RUQ  Narrative: EXAMINATION:  RIGHT UPPER QUADRANT ULTRASOUND    9/25/2024 11:46 am    COMPARISON:  09/24/2024    HISTORY:  ORDERING SYSTEM PROVIDED HISTORY: elevated lfts, cholelithiasis on CT  TECHNOLOGIST PROVIDED HISTORY:    Reason for exam:->elevated lfts, cholelithiasis on CT    FINDINGS:  LIVER:  The liver is normal in size and echotexture.  There is no ductal  dilatation or mass.    BILIARY SYSTEM: Cholelithiases are present in lumen of the gallbladder.  The  gallbladder wall is within normal limits.  The common bile duct measures 5  mm.  However, there is an unchanged 9 mm choledocholithiasis within the mid  common bile duct.    RIGHT KIDNEY: The right kidney is unremarkable measuring 9.9 cm.  There is no  renal mass or hydronephrosis.    PANCREAS:  Visualized portions of the pancreas are unremarkable.    OTHER: No evidence of right upper quadrant ascites.  Impression: 1. Unchanged 9 mm choledocholithiasis; correlate with ERCP.       Labs:   Recent Labs     09/24/24  1319 09/25/24  0425 09/25/24  1619 09/26/24  0420   WBC 7.8 5.3  --  6.0   HGB 13.6 12.6  --  13.1    143  --  148   ALKPHOS 204* 162*  --  167*   * 250*  --  184*   * 118*  --  59*   BILITOT 1.9* 1.1*  --  0.6   BILIDIR  --  0.5*  --  0.3   IBILI  --  0.6  --  0.3   BUN 18 13  --  29*   CREATININE 0.9 0.8  --  0.8   * 126* 131* 133*   K 4.3 4.1  --  4.2   INR 1.58*  --   --   --           Assessment:  Hospital Problems             Last Modified POA    * (Principal) General weakness 9/24/2024 Yes       84-year-old woman with choledocholithiasis.    Plan:  Okay for the patient to

## 2024-09-26 NOTE — PROGRESS NOTES
Spoke with Bravo in MRI regarding plan for today. Stated that pt is lower on the list and if she has the procedure done today, it will be this evening.

## 2024-10-02 ENCOUNTER — OFFICE VISIT (OUTPATIENT)
Dept: INTERNAL MEDICINE CLINIC | Age: 84
End: 2024-10-02

## 2024-10-02 VITALS
BODY MASS INDEX: 20.33 KG/M2 | SYSTOLIC BLOOD PRESSURE: 136 MMHG | HEART RATE: 89 BPM | HEIGHT: 65 IN | TEMPERATURE: 97.9 F | WEIGHT: 122 LBS | DIASTOLIC BLOOD PRESSURE: 80 MMHG

## 2024-10-02 DIAGNOSIS — Z09 HOSPITAL DISCHARGE FOLLOW-UP: Primary | ICD-10-CM

## 2024-10-02 DIAGNOSIS — E87.1 HYPONATREMIA: ICD-10-CM

## 2024-10-02 DIAGNOSIS — I10 ESSENTIAL HYPERTENSION: ICD-10-CM

## 2024-10-02 DIAGNOSIS — R74.01 TRANSAMINITIS: ICD-10-CM

## 2024-10-02 DIAGNOSIS — K80.50 CHOLEDOCHOLITHIASIS: ICD-10-CM

## 2024-10-02 PROBLEM — D69.6 THROMBOCYTOPENIA, UNSPECIFIED (HCC): Status: RESOLVED | Noted: 2024-06-28 | Resolved: 2024-10-02

## 2024-10-02 NOTE — PROGRESS NOTES
Post-Discharge Transitional Care  Follow Up      Stephania Sims   YOB: 1940    Date of Office Visit:  10/2/2024  Date of Hospital Admission: 9/24/24  Date of Hospital Discharge: 9/26/24  Risk of hospital readmission (high >=14%. Medium >=10%) :Readmission Risk Score: 9.7      Care management risk score Rising risk (score 2-5) and Complex Care (Scores >=6): No Risk Score On File     Non face to face  following discharge, date last encounter closed (first attempt may have been earlier): *No documented post hospital discharge outreach found in the last 14 days    Call initiated 2 business days of discharge: *No response recorded in the last 14 days    ASSESSMENT/PLAN:   Hospital discharge follow-up  -     FL DISCHARGE MEDS RECONCILED W/ CURRENT OUTPATIENT MED LIST    Essential hypertension  -Blood pressure near goal.  Continue on lisinopril.  Transaminitis  Choledocholithiasis  -Noted upcoming MRI tomorrow.  Ultrasound findings of choledocholithiasis.  Does not appear icteric on exam.  Abdominal examination benign today.  I have provided contact information for her to follow-up with GI.  Hyponatremia  -Likely SIADH.  She is currently on sodium chloride tablets.  Her last sodium level on discharge was 133.  Repeat CMP.  I have advised that she follow-up with nephrology outpatient.  I have provided her with contact information for their office  -     Comprehensive Metabolic Panel; Future      Medical Decision Making: moderate complexity  No follow-ups on file.           Subjective:   HPI:  Follow up of Hospital problems/diagnosis(es):     Inpatient course: Discharge summary reviewed- see chart.    Interval history/Current status: Patient is 84-year-old female who was recently admitted to the hospital due to generalized weakness likely secondary to hyponatremia, nephrology saw the patient and was placed on sodium chloride tablets twice daily and lisinopril increased to 10 mg daily.  She did have

## 2024-10-03 ENCOUNTER — HOSPITAL ENCOUNTER (OUTPATIENT)
Dept: MRI IMAGING | Age: 84
Discharge: HOME | End: 2024-10-03
Attending: INTERNAL MEDICINE
Payer: MEDICARE

## 2024-10-03 DIAGNOSIS — R79.89 ELEVATED LFTS: ICD-10-CM

## 2024-10-03 PROCEDURE — 74183 MRI ABD W/O CNTR FLWD CNTR: CPT

## 2024-10-03 PROCEDURE — A9579 GAD-BASE MR CONTRAST NOS,1ML: HCPCS | Performed by: INTERNAL MEDICINE

## 2024-10-03 PROCEDURE — 6360000004 HC RX CONTRAST MEDICATION: Performed by: INTERNAL MEDICINE

## 2024-10-03 RX ADMIN — GADOTERIDOL 11 ML: 279.3 INJECTION, SOLUTION INTRAVENOUS at 09:34

## 2024-10-03 NOTE — PROGRESS NOTES
Physician Progress Note      PATIENT:               RADHA CALIXTO  [facility] #:                  [id number]  :                       1940  ADMIT DATE:       2024 12:09 PM  DISCH DATE:        2024 2:22 PM  RESPONDING  PROVIDER #:        LOPEZ VERDIN - NP          QUERY TEXT:    Pt admitted with Hyponatremia.Noted documentation of SIADH in Nephrology   consultant note on  and .If possible, please document in progress   notes and discharge summary:    The medical record reflects the following:  Risk Factors: Hyponatremia, Hypovolemia  Clinical Indicators: Nephrology CN on  and -She could have   hyponatremia due to SIADH especially given that her sodium came down to after   the fluids.  DS on -Generalized weakness possibly 2/2 hyponatremia (128 on arrival).   Suspect could be due to hypovolemia.  Sodium-128,126,131,133  Sodium, Ur-35  Osmolality-274  Osmolality, Urine-517  Treatment: IVF sodium chloride 0.9 % bolus 1,000 mL, 0.9 % sodium chloride   infusion    Thank You,  RADHA Dudley, CDS  Options provided:  -- SIADH confirmed present on admission  -- SIADH ruled out  -- Other - I will add my own diagnosis  -- Disagree - Not applicable / Not valid  -- Disagree - Clinically unable to determine / Unknown  -- Refer to Clinical Documentation Reviewer    PROVIDER RESPONSE TEXT:    The diagnosis of SIADH was ruled out.    Query created by: Emma Marinelli on 2024 7:44 AM      QUERY TEXT:    Patient admitted with hyponatremia. Documentation reflects Calculus of   gallbladder with biliary obstruction but without cholecystitis in ED provider   note dated 2024.  If possible, please document in the progress notes and   discharge summary if Calculus of gallbladder with biliary obstruction but   without cholecystitis was:    The medical record reflects the following:  Risk Factors: Cholelithiasis and choledocholithiasis, Transaminitis  Clinical Indicators: Gastroenterology

## 2024-10-08 ENCOUNTER — TELEPHONE (OUTPATIENT)
Dept: CARDIOLOGY CLINIC | Age: 84
End: 2024-10-08

## 2024-10-08 NOTE — TELEPHONE ENCOUNTER
CARDIAC CLEARANCE REQUEST    Kareen (pt daughter and on HIPAA) stated pt needs gallstone removed due to it moving into pt bile duct.     What type of procedure are you having:  Gallstone removal  Are you taking any blood thinners:  Eliquis.   Type on anesthesia:  IV sedation  When is your procedure scheduled for:  Wont schedule till cleared  What physician is performing your procedure:  Dr. Harry  Phone Number:  [phone]   Fax number to send the letter:   [phone]     Pt Eliquis was discontinued while in [facility] on 9.24.2024. Pt has been taking the Eliquis since being home from [facility].     Next ov: 10.11.2024 NPKK  Last ov: 12.19.2023 AGK

## 2024-10-10 NOTE — PROGRESS NOTES
S1 and S2.  Jugular venous pulsation Normal  The carotid upstroke is normal in amplitude and contour without delay or bruit  Peripheral pulses are symmetrical and full   Abdomen:  No masses or tenderness  Bowel sounds present  Extremities:   No cyanosis or clubbing   No lower extremity edema   Skin: warm and dry  Neurological:  Alert and oriented  Moves all extremities well  No abnormalities of mood, affect, memory, mentation, or behavior are noted    DATA:    ECG 10/11/2024  Atrial fibrillation rate 59    Echo 11/9/2023  Summary   Difficult to accurately assess left ventricular systolic function due to   arrhythmia but appears grossly normal with an ejection fraction of 55 - 60   %.   Septal bounce due to right ventricular volume overload.   Left ventricular diastolic filling pressure is normal.   Compared to previous study left ventricle systolic function appears same as   echo on 5-3-2023.   Moderate mitral and tricuspid regurgitation.   The left atrium is mildly dilated.   Bi-atrial enlargement.   Mild aortic and pulmonic regurgitation.   The right ventricle is mildly enlarged.   Right ventricular systolic function is mildly reduced .   Systolic pulmonic artery pressure (SPAP) is estimated at 64 mmHg consistent   with moderate to severe pulmonary hypertension (Right atrial pressure of 8 mmHg).   The right atrium is moderately dilated.      All labs and testing reviewed.  CARDIOLOGY LABS:   CBC:   WBC   Date Value Ref Range Status   09/26/2024 6.0 4.0 - 11.0 K/uL Final   09/25/2024 5.3 4.0 - 11.0 K/uL Final   09/24/2024 7.8 4.0 - 11.0 K/uL Final     Hemoglobin   Date Value Ref Range Status   09/26/2024 13.1 12.0 - 16.0 g/dL Final   09/25/2024 12.6 12.0 - 16.0 g/dL Final   09/24/2024 13.6 12.0 - 16.0 g/dL Final     Platelets   Date Value Ref Range Status   09/26/2024 148 135 - 450 K/uL Final   09/25/2024 143 135 - 450 K/uL Final   09/24/2024 170 135 - 450 K/uL Final     BMP:   Sodium   Date Value Ref Range Status

## 2024-10-11 ENCOUNTER — OFFICE VISIT (OUTPATIENT)
Dept: CARDIOLOGY CLINIC | Age: 84
End: 2024-10-11
Payer: MEDICARE

## 2024-10-11 ENCOUNTER — HOSPITAL ENCOUNTER (OUTPATIENT)
Age: 84
Discharge: HOME | End: 2024-10-11
Payer: MEDICARE

## 2024-10-11 VITALS
BODY MASS INDEX: 20.23 KG/M2 | HEART RATE: 59 BPM | HEIGHT: 65 IN | DIASTOLIC BLOOD PRESSURE: 62 MMHG | WEIGHT: 121.4 LBS | SYSTOLIC BLOOD PRESSURE: 126 MMHG

## 2024-10-11 DIAGNOSIS — I49.9 IRREGULAR HEART RATE: ICD-10-CM

## 2024-10-11 DIAGNOSIS — I48.0 PAROXYSMAL A-FIB (HCC): ICD-10-CM

## 2024-10-11 DIAGNOSIS — I48.0 PAROXYSMAL A-FIB (HCC): Primary | ICD-10-CM

## 2024-10-11 DIAGNOSIS — E87.1 HYPONATREMIA: ICD-10-CM

## 2024-10-11 LAB
ALBUMIN SERPL-MCNC: 4.2 G/DL (ref 3.4–5)
ALBUMIN/GLOB SERPL: 1.7 {RATIO} (ref 1.1–2.2)
ALP SERPL-CCNC: 129 U/L (ref 40–129)
ALT SERPL-CCNC: 37 U/L (ref 10–40)
ANION GAP SERPL CALCULATED.3IONS-SCNC: 9 MMOL/L (ref 3–16)
AST SERPL-CCNC: 28 U/L (ref 15–37)
BILIRUB SERPL-MCNC: 0.7 MG/DL (ref 0–1)
BUN SERPL-MCNC: 14 MG/DL (ref 7–20)
CALCIUM SERPL-MCNC: 9 MG/DL (ref 8.3–10.6)
CHLORIDE SERPL-SCNC: 95 MMOL/L (ref 99–110)
CO2 SERPL-SCNC: 28 MMOL/L (ref 21–32)
CREAT SERPL-MCNC: 0.8 MG/DL (ref 0.6–1.2)
GFR SERPLBLD CREATININE-BSD FMLA CKD-EPI: 73 ML/MIN/{1.73_M2}
GLUCOSE SERPL-MCNC: 117 MG/DL (ref 70–99)
POTASSIUM SERPL-SCNC: 4.3 MMOL/L (ref 3.5–5.1)
PROT SERPL-MCNC: 6.7 G/DL (ref 6.4–8.2)
SODIUM SERPL-SCNC: 132 MMOL/L (ref 136–145)
TSH SERPL DL<=0.005 MIU/L-ACNC: 1.44 UIU/ML (ref 0.27–4.2)

## 2024-10-11 PROCEDURE — 1123F ACP DISCUSS/DSCN MKR DOCD: CPT

## 2024-10-11 PROCEDURE — G8484 FLU IMMUNIZE NO ADMIN: HCPCS

## 2024-10-11 PROCEDURE — 80053 COMPREHEN METABOLIC PANEL: CPT

## 2024-10-11 PROCEDURE — G8427 DOCREV CUR MEDS BY ELIG CLIN: HCPCS

## 2024-10-11 PROCEDURE — 93000 ELECTROCARDIOGRAM COMPLETE: CPT

## 2024-10-11 PROCEDURE — 3074F SYST BP LT 130 MM HG: CPT

## 2024-10-11 PROCEDURE — 1111F DSCHRG MED/CURRENT MED MERGE: CPT

## 2024-10-11 PROCEDURE — G8420 CALC BMI NORM PARAMETERS: HCPCS

## 2024-10-11 PROCEDURE — 3078F DIAST BP <80 MM HG: CPT

## 2024-10-11 PROCEDURE — 99214 OFFICE O/P EST MOD 30 MIN: CPT

## 2024-10-11 PROCEDURE — G8399 PT W/DXA RESULTS DOCUMENT: HCPCS

## 2024-10-11 PROCEDURE — 1036F TOBACCO NON-USER: CPT

## 2024-10-11 PROCEDURE — 36415 COLL VENOUS BLD VENIPUNCTURE: CPT

## 2024-10-11 PROCEDURE — 84443 ASSAY THYROID STIM HORMONE: CPT

## 2024-10-11 PROCEDURE — 1090F PRES/ABSN URINE INCON ASSESS: CPT

## 2024-10-11 NOTE — PATIENT INSTRUCTIONS
Continue Eliquis 2.5 mg twice daily for stroke risk reduction  Continue propranolol 120 mg daily  Continue lisinopril 10 mg daily  Ok to proceed with ERCP with GI and come off Eliquis X 48 hours  TSH/T4 level    Follow up in 6 months Divya PEOPLES

## 2024-10-15 NOTE — PROGRESS NOTES
Stephania Cordoncer    Age 84 y.o.    female    1940    MRN [id number]    10/21/2024  Arrival Time_____________  OR Time____________60 Min     Procedure(s):  ENDOSCOPIC RETROGRADE CHOLANGIOPANCREATOGRAPHY                      General   Surgeon(s):  Pieter Harry, MD      DAY ADMIT ___  SDS/OP ___  OUTPT IN BED ___        Phone [phone] (home)                  PCP _____________________ Phone_________________ Epic ( ) Epic CE ( ) Appt ________    NOTES: _________________________________________ Consult/Cardio _______________    ____________________________________________________________________________    ____________________________________________________________________________  PAT APPT DATE:________ TIME: ________  FAXED QAD: _______  (__) H&P w/ Hospitalist    (__) PAT orders in EPIC    (__) Meet with PAT nurse  __________________________________________________________________________  Preop Nurse phone screen complete: _____________  (__) CBC     (__) W/ DIFF ___________  (__) CT CHEST  __________   (__) Hgb A1C    ___________  (__) CHEST X RAY   __________  (__) LIPID PROFILE  ___________  (__) EKG   __________  (__) PT-INR / APTT  ___________  (__) PFT's   __________  (__) BMP   ___________  (__) CAROTIDS  __________  (__) CMP   ___________  (__) VEIN MAPPING  __________  (__) U/A   ___________  ( X ) HISTORY & PHYSICAL __________  (__) URINE C & S  ___________  (__) CARDIAC CLEARANCE __________  (__) U/A W/ FLEX  ___________  (__) PULM. CLEARANCE __________  (__) SERUM PREGNANCY ___________  (__) Preop Orders in EPIC __________  (__) TYPE & SCREEN __________repeat ( ) (__)  __________________ __________  (__) Albumin   ___________  (__)  __________________ __________  (__) TRANSFERRIN  ___________  (__)  __________________ __________  (__) LIVER PROFILE  ___________  (__) URINE PREG DOS __________  (__) MRSA NASAL SWAB ___________  (__) BLOOD SUGAR DOS __________  (__) SED RATE  ___________  (__) OAC

## 2024-10-15 NOTE — PROGRESS NOTES
Surgery Date and Time: 10/21/24 @ 02:00 PM    Arrival Time:  12:00 PM        The instructions given when and if a patient needs to stop oral intake prior to surgery varies. Follow the instructions you were      given by your Surgeon or RN during the Pre-op call.      __X__Do not eat or drink anything after Midnight the night before the surgery. NO gum, mints, candy or ice chips the day of surgery.         Only take the following medications with a small sip of water the morning of surgery:   PROPRANOLOL        Hold the following medications (per anesthesia or surgeon request):  LISINOPRIL     Last Dose:  10/20/24 AM         Aspirin, Ibuprofen, Advil, Naproxen, Vitamin E and other Anti-inflammatory products and supplements should be stopped        for 5 -7days before surgery or as directed by your physician. Multivitamin last dose 10/15/24.           Check with your Surgeon/PCP/Cardiologist regarding stopping Plavix, Coumadin, Eliquis, Lovenox, Effient, Pradaxa, Xarelto, Fragmin or        other blood thinners and follow their instructions.  Medication:    eliquis        Last dose:  10/18/24              NO DIABETES MEDICATIONS DAY OF SURGERY.                - If you take a long acting-insulin, take your normal dose the night before surgery & half the dose if taken in the morning.     - Do not smoke or vape, and do not drink any alcoholic beverages 24 hours prior to surgery, this includes NA Beer. Refrain from using     any recreational drugs, including non-prescribed prescription drugs.     -You may brush your teeth and gargle the morning of surgery.  DO NOT SWALLOW WATER.    -You MUST plan for a responsible adult to stay on site while you are here and take you home after your surgery. You will not be allowed                to leave alone or drive yourself home. It is requested someone stay with you the first 24 hrs. Your surgery will be cancelled if you do not                have a ride home with a

## 2024-10-21 ENCOUNTER — ANESTHESIA EVENT (OUTPATIENT)
Dept: ENDOSCOPY | Age: 84
End: 2024-10-21
Payer: MEDICARE

## 2024-10-21 ENCOUNTER — HOSPITAL ENCOUNTER (OUTPATIENT)
Age: 84
Setting detail: OUTPATIENT SURGERY
Discharge: HOME | End: 2024-10-21
Attending: INTERNAL MEDICINE | Admitting: INTERNAL MEDICINE
Payer: MEDICARE

## 2024-10-21 ENCOUNTER — ANESTHESIA (OUTPATIENT)
Dept: ENDOSCOPY | Age: 84
End: 2024-10-21
Payer: MEDICARE

## 2024-10-21 ENCOUNTER — APPOINTMENT (OUTPATIENT)
Dept: GENERAL RADIOLOGY | Age: 84
End: 2024-10-21
Attending: INTERNAL MEDICINE
Payer: MEDICARE

## 2024-10-21 VITALS
HEART RATE: 49 BPM | DIASTOLIC BLOOD PRESSURE: 124 MMHG | HEIGHT: 65 IN | TEMPERATURE: 97.1 F | SYSTOLIC BLOOD PRESSURE: 148 MMHG | BODY MASS INDEX: 20.16 KG/M2 | RESPIRATION RATE: 17 BRPM | OXYGEN SATURATION: 97 % | WEIGHT: 121 LBS

## 2024-10-21 DIAGNOSIS — K80.40 CHOLEDOCHOLITHIASIS WITH CHOLECYSTITIS: Primary | ICD-10-CM

## 2024-10-21 PROCEDURE — 2580000003 HC RX 258: Performed by: NURSE ANESTHETIST, CERTIFIED REGISTERED

## 2024-10-21 PROCEDURE — 7100000011 HC PHASE II RECOVERY - ADDTL 15 MIN: Performed by: INTERNAL MEDICINE

## 2024-10-21 PROCEDURE — 7100000010 HC PHASE II RECOVERY - FIRST 15 MIN: Performed by: INTERNAL MEDICINE

## 2024-10-21 PROCEDURE — 6360000004 HC RX CONTRAST MEDICATION: Performed by: INTERNAL MEDICINE

## 2024-10-21 PROCEDURE — 3609014900 HC ERCP W/SPHINCTEROTOMY &/OR PAPILLOTOMY: Performed by: INTERNAL MEDICINE

## 2024-10-21 PROCEDURE — 2709999900 HC NON-CHARGEABLE SUPPLY: Performed by: INTERNAL MEDICINE

## 2024-10-21 PROCEDURE — 2500000003 HC RX 250 WO HCPCS: Performed by: NURSE ANESTHETIST, CERTIFIED REGISTERED

## 2024-10-21 PROCEDURE — 7100000000 HC PACU RECOVERY - FIRST 15 MIN: Performed by: INTERNAL MEDICINE

## 2024-10-21 PROCEDURE — 3609014300 HC ERCP BALLOON DILATE BILIARY/PANC DUCT/AMPULLA EA: Performed by: INTERNAL MEDICINE

## 2024-10-21 PROCEDURE — 6370000000 HC RX 637 (ALT 250 FOR IP): Performed by: INTERNAL MEDICINE

## 2024-10-21 PROCEDURE — C1726 CATH, BAL DIL, NON-VASCULAR: HCPCS | Performed by: INTERNAL MEDICINE

## 2024-10-21 PROCEDURE — 6360000002 HC RX W HCPCS: Performed by: NURSE ANESTHETIST, CERTIFIED REGISTERED

## 2024-10-21 PROCEDURE — 74330 X-RAY BILE/PANC ENDOSCOPY: CPT

## 2024-10-21 PROCEDURE — 3700000001 HC ADD 15 MINUTES (ANESTHESIA): Performed by: INTERNAL MEDICINE

## 2024-10-21 PROCEDURE — 2720000010 HC SURG SUPPLY STERILE: Performed by: INTERNAL MEDICINE

## 2024-10-21 PROCEDURE — 7100000001 HC PACU RECOVERY - ADDTL 15 MIN: Performed by: INTERNAL MEDICINE

## 2024-10-21 PROCEDURE — 3700000000 HC ANESTHESIA ATTENDED CARE: Performed by: INTERNAL MEDICINE

## 2024-10-21 PROCEDURE — 3609015200 HC ERCP REMOVE CALCULI/DEBRIS BILIARY/PANCREAS DUCT: Performed by: INTERNAL MEDICINE

## 2024-10-21 RX ORDER — SODIUM CHLORIDE 9 MG/ML
INJECTION, SOLUTION INTRAVENOUS
Status: DISCONTINUED | OUTPATIENT
Start: 2024-10-21 | End: 2024-10-21 | Stop reason: SDUPTHER

## 2024-10-21 RX ORDER — SODIUM CHLORIDE 0.9 % (FLUSH) 0.9 %
5-40 SYRINGE (ML) INJECTION EVERY 12 HOURS SCHEDULED
Status: DISCONTINUED | OUTPATIENT
Start: 2024-10-21 | End: 2024-10-21 | Stop reason: HOSPADM

## 2024-10-21 RX ORDER — SODIUM CHLORIDE, SODIUM LACTATE, POTASSIUM CHLORIDE, CALCIUM CHLORIDE 600; 310; 30; 20 MG/100ML; MG/100ML; MG/100ML; MG/100ML
INJECTION, SOLUTION INTRAVENOUS CONTINUOUS
Status: DISCONTINUED | OUTPATIENT
Start: 2024-10-21 | End: 2024-10-21 | Stop reason: HOSPADM

## 2024-10-21 RX ORDER — PROPOFOL 10 MG/ML
INJECTION, EMULSION INTRAVENOUS
Status: DISCONTINUED | OUTPATIENT
Start: 2024-10-21 | End: 2024-10-21 | Stop reason: SDUPTHER

## 2024-10-21 RX ORDER — SODIUM CHLORIDE 0.9 % (FLUSH) 0.9 %
5-40 SYRINGE (ML) INJECTION PRN
Status: DISCONTINUED | OUTPATIENT
Start: 2024-10-21 | End: 2024-10-21 | Stop reason: HOSPADM

## 2024-10-21 RX ORDER — ONDANSETRON 2 MG/ML
INJECTION INTRAMUSCULAR; INTRAVENOUS
Status: DISCONTINUED | OUTPATIENT
Start: 2024-10-21 | End: 2024-10-21 | Stop reason: SDUPTHER

## 2024-10-21 RX ORDER — OXYCODONE HYDROCHLORIDE 5 MG/1
5 TABLET ORAL PRN
Status: DISCONTINUED | OUTPATIENT
Start: 2024-10-21 | End: 2024-10-21 | Stop reason: HOSPADM

## 2024-10-21 RX ORDER — NALOXONE HYDROCHLORIDE 0.4 MG/ML
INJECTION, SOLUTION INTRAMUSCULAR; INTRAVENOUS; SUBCUTANEOUS PRN
Status: DISCONTINUED | OUTPATIENT
Start: 2024-10-21 | End: 2024-10-21 | Stop reason: HOSPADM

## 2024-10-21 RX ORDER — LIDOCAINE HYDROCHLORIDE 20 MG/ML
INJECTION, SOLUTION INFILTRATION; PERINEURAL
Status: DISCONTINUED | OUTPATIENT
Start: 2024-10-21 | End: 2024-10-21 | Stop reason: SDUPTHER

## 2024-10-21 RX ORDER — DEXAMETHASONE SODIUM PHOSPHATE 4 MG/ML
INJECTION, SOLUTION INTRA-ARTICULAR; INTRALESIONAL; INTRAMUSCULAR; INTRAVENOUS; SOFT TISSUE
Status: DISCONTINUED | OUTPATIENT
Start: 2024-10-21 | End: 2024-10-21 | Stop reason: SDUPTHER

## 2024-10-21 RX ORDER — SODIUM CHLORIDE 9 MG/ML
INJECTION, SOLUTION INTRAVENOUS PRN
Status: DISCONTINUED | OUTPATIENT
Start: 2024-10-21 | End: 2024-10-21 | Stop reason: HOSPADM

## 2024-10-21 RX ORDER — MEPERIDINE HYDROCHLORIDE 50 MG/ML
12.5 INJECTION INTRAMUSCULAR; INTRAVENOUS; SUBCUTANEOUS EVERY 5 MIN PRN
Status: DISCONTINUED | OUTPATIENT
Start: 2024-10-21 | End: 2024-10-21 | Stop reason: HOSPADM

## 2024-10-21 RX ORDER — INDOMETHACIN 50 MG/1
SUPPOSITORY RECTAL PRN
Status: DISCONTINUED | OUTPATIENT
Start: 2024-10-21 | End: 2024-10-21 | Stop reason: ALTCHOICE

## 2024-10-21 RX ORDER — OXYCODONE HYDROCHLORIDE 5 MG/1
10 TABLET ORAL PRN
Status: DISCONTINUED | OUTPATIENT
Start: 2024-10-21 | End: 2024-10-21 | Stop reason: HOSPADM

## 2024-10-21 RX ORDER — MIDAZOLAM HYDROCHLORIDE 1 MG/ML
2 INJECTION, SOLUTION INTRAMUSCULAR; INTRAVENOUS
Status: DISCONTINUED | OUTPATIENT
Start: 2024-10-21 | End: 2024-10-21 | Stop reason: HOSPADM

## 2024-10-21 RX ORDER — LIDOCAINE HYDROCHLORIDE 10 MG/ML
0.3 INJECTION, SOLUTION EPIDURAL; INFILTRATION; INTRACAUDAL; PERINEURAL
Status: DISCONTINUED | OUTPATIENT
Start: 2024-10-21 | End: 2024-10-21 | Stop reason: HOSPADM

## 2024-10-21 RX ORDER — DIPHENHYDRAMINE HYDROCHLORIDE 50 MG/ML
6.25 INJECTION INTRAMUSCULAR; INTRAVENOUS
Status: DISCONTINUED | OUTPATIENT
Start: 2024-10-21 | End: 2024-10-21 | Stop reason: HOSPADM

## 2024-10-21 RX ORDER — ROCURONIUM BROMIDE 10 MG/ML
INJECTION, SOLUTION INTRAVENOUS
Status: DISCONTINUED | OUTPATIENT
Start: 2024-10-21 | End: 2024-10-21 | Stop reason: SDUPTHER

## 2024-10-21 RX ORDER — ONDANSETRON 2 MG/ML
4 INJECTION INTRAMUSCULAR; INTRAVENOUS ONCE
Status: DISCONTINUED | OUTPATIENT
Start: 2024-10-21 | End: 2024-10-21 | Stop reason: HOSPADM

## 2024-10-21 RX ADMIN — DEXAMETHASONE SODIUM PHOSPHATE 4 MG: 4 INJECTION, SOLUTION INTRAMUSCULAR; INTRAVENOUS at 14:41

## 2024-10-21 RX ADMIN — PROPOFOL 100 MG: 10 INJECTION, EMULSION INTRAVENOUS at 14:27

## 2024-10-21 RX ADMIN — ROCURONIUM BROMIDE 40 MG: 50 INJECTION, SOLUTION INTRAVENOUS at 14:27

## 2024-10-21 RX ADMIN — ONDANSETRON 4 MG: 2 INJECTION INTRAMUSCULAR; INTRAVENOUS at 14:41

## 2024-10-21 RX ADMIN — SODIUM CHLORIDE: 9 INJECTION, SOLUTION INTRAVENOUS at 14:22

## 2024-10-21 RX ADMIN — LIDOCAINE HYDROCHLORIDE 60 MG: 20 INJECTION, SOLUTION INFILTRATION; PERINEURAL at 14:27

## 2024-10-21 ASSESSMENT — PAIN - FUNCTIONAL ASSESSMENT: PAIN_FUNCTIONAL_ASSESSMENT: 0-10

## 2024-10-21 ASSESSMENT — PAIN SCALES - GENERAL
PAINLEVEL_OUTOF10: 0
PAINLEVEL_OUTOF10: 0

## 2024-10-21 NOTE — H&P
comments:     smoked for 2 years when just out of high school   Vaping Use    Vaping status: Never Used   Substance and Sexual Activity    Alcohol use: No    Drug use: Never    Sexual activity: Not Currently     Partners: Male     Comment:    Other Topics Concern    Not on file   Social History Narrative    Not on file     Social Determinants of Health     Financial Resource Strain: Low Risk  (6/28/2024)    Overall Financial Resource Strain (CARDIA)     Difficulty of Paying Living Expenses: Not hard at all   Food Insecurity: No Food Insecurity (9/24/2024)    Hunger Vital Sign     Worried About Running Out of Food in the Last Year: Never true     Ran Out of Food in the Last Year: Never true   Transportation Needs: No Transportation Needs (9/24/2024)    PRAPARE - Transportation     Lack of Transportation (Medical): No     Lack of Transportation (Non-Medical): No   Physical Activity: Insufficiently Active (3/2/2023)    Exercise Vital Sign     Days of Exercise per Week: 4 days     Minutes of Exercise per Session: 20 min   Stress: Not on file   Social Connections: Not on file   Intimate Partner Violence: Not on file   Housing Stability: Low Risk  (9/24/2024)    Housing Stability Vital Sign     Unable to Pay for Housing in the Last Year: No     Number of Times Moved in the Last Year: 1     Homeless in the Last Year: No      Family History:       Problem Relation Age of Onset    High Blood Pressure Mother     Arthritis Mother     Parkinsonism Mother         Parkinson's Disease    Heart Disease Father     High Blood Pressure Father     Cancer Sister         breast & bladder carcinoma    High Blood Pressure Sister     Other Sister         benign familial tremor    Cancer Sister         brain    Cancer Sister         breast    Other Brother         benign familial tremor    High Blood Pressure Son     Diabetes Paternal Grandfather         PHYSICAL EXAM:      Ht 1.651 m (5' 5\")   Wt 54.9 kg (121 lb)   LMP 03/01/1992

## 2024-10-21 NOTE — PROGRESS NOTES
Pt brought to PACU. Report obtained from OR RN and anesthesia. Pt placed on monitor NSR and O2 at 99% on RA.

## 2024-10-21 NOTE — PROGRESS NOTES
Patient admitted to \[facility]\"" room 2 in preparation for surgery, VSS. Consent confirmed. IV inserted into right hand, saline locked Belongings on SDS cart. NPO since 2200. Family at bedside, phone number in system for text updates, call light within reach.

## 2024-10-21 NOTE — ANESTHESIA PRE PROCEDURE
Department of Anesthesiology  Preprocedure Note       Name:  Stephania Sims   Age:  84 y.o.  :  1940                                          MRN:  [id number]         Date:  10/21/2024      Surgeon: Surgeon(s):  Pieter Harry MD    Procedure: Procedure(s):  ENDOSCOPIC RETROGRADE CHOLANGIOPANCREATOGRAPHY    Medications prior to admission:   Prior to Admission medications    Medication Sig Start Date End Date Taking? Authorizing Provider   lisinopril (PRINIVIL;ZESTRIL) 10 MG tablet Take 1 tablet by mouth daily 24  Yes Brenda Hahn Cici, VELVET   propranolol (INDERAL LA) 120 MG extended release capsule TAKE 1 CAPSULE BY MOUTH EVERY DAY  Patient taking differently: Take 1 capsule by mouth daily TAKE 1 CAPSULE BY MOUTH EVERY DAY 23  Yes Azeem Sterling MD   apixaban (ELIQUIS) 2.5 MG TABS tablet Take 1 tablet by mouth 2 times daily    ProviderCandy MD   sodium chloride 1 g tablet Take 1 tablet by mouth in the morning and at bedtime  Patient taking differently: Take 1 tablet by mouth daily 24   Brenda Hahn, APRCARLOTA   Cholecalciferol (VITAMIN D) 50 MCG (2000) CAPS capsule Take 1 capsule by mouth daily    ProviderCandy MD   Multiple Vitamins-Minerals (MULTIVITAMIN ADULT PO) Take 1 tablet by mouth daily    ProviderCandy MD       Current medications:    Current Facility-Administered Medications   Medication Dose Route Frequency Provider Last Rate Last Admin   • lidocaine PF 1 % injection 0.3 mL  0.3 mL IntraDERmal Once PRN Ramo Separ MD       • lactated ringers IV soln infusion SOLN   IntraVENous Continuous Ramo Spear MD       • sodium chloride flush 0.9 % injection 5-40 mL  5-40 mL IntraVENous 2 times per day Ramo Spear MD       • sodium chloride flush 0.9 % injection 5-40 mL  5-40 mL IntraVENous PRN Ramo Spear MD       • 0.9 % sodium chloride infusion   IntraVENous PRN Ramo Spear MD           Allergies:    Allergies   Allergen Reactions   • Codeine

## 2024-10-22 ENCOUNTER — TELEPHONE (OUTPATIENT)
Dept: CARDIOLOGY CLINIC | Age: 84
End: 2024-10-22

## 2024-10-22 NOTE — TELEPHONE ENCOUNTER
Last Ov- 10.11.24 NPKK  Next Ov- 04.11.25 NPKK    Last labs - 10/2024     Signed per protocol.      5

## 2024-10-22 NOTE — TELEPHONE ENCOUNTER
Pt would like refills of Eliquis and Lisinopril sent to [facility] in [facility].      Last Ov- 10.11.24 NPKK  Next Ov- 04.11.25 NPKK

## 2024-10-24 NOTE — ANESTHESIA POSTPROCEDURE EVALUATION
Department of Anesthesiology  Postprocedure Note    Patient: Stephania Sims  MRN: [id number]  YOB: 1940  Date of evaluation: 10/24/2024    Procedure Summary       Date: 10/21/24 Room / Location: [facility] VIRTUAL ROOM / [facility]    Anesthesia Start: 1422 Anesthesia Stop: 1507    Procedures:       ENDOSCOPIC RETROGRADE CHOLANGIOPANCREATOGRAPHY SPHINCTER/PAPILLOTOMY      ENDOSCOPIC RETROGRADE CHOLANGIOPANCREATOGRAPHY DILATION BALLOON      ENDOSCOPIC RETROGRADE CHOLANGIOPANCREATOGRAPHY STONE REMOVAL Diagnosis:       Choledocholithiasis      (Choledocholithiasis [K80.50])    Surgeons: Pieter Harry MD Responsible Provider: Mitch Inman MD    Anesthesia Type: general ASA Status: 3            Anesthesia Type: No value filed.    Nura Phase I: Nura Score: 10    Nura Phase II: Nura Score: 10    Anesthesia Post Evaluation    Comments: Anes Post-op Note    Name:    Stephania Sims  MRN:      [id number]    Patient Vitals in the past 12 hrs:     LABS:    CBC  Lab Results       Component                Value               Date/Time                  WBC                      6.0                 09/26/2024 04:20 AM        HGB                      13.1                09/26/2024 04:20 AM        HCT                      38.3                09/26/2024 04:20 AM        PLT                      148                 09/26/2024 04:20 AM   RENAL  Lab Results       Component                Value               Date/Time                  NA                       132 (L)             10/11/2024 01:55 PM        K                        4.3                 10/11/2024 01:55 PM        K                        4.2                 09/26/2024 04:20 AM        CL                       95 (L)              10/11/2024 01:55 PM        CO2                      28                  10/11/2024 01:55 PM        BUN                      14                  10/11/2024 01:55 PM        CREATININE               0.8

## 2024-11-01 ENCOUNTER — PREP FOR PROCEDURE (OUTPATIENT)
Dept: SURGERY | Age: 84
End: 2024-11-01

## 2024-11-01 ENCOUNTER — INITIAL CONSULT (OUTPATIENT)
Dept: SURGERY | Age: 84
End: 2024-11-01

## 2024-11-01 ENCOUNTER — TELEPHONE (OUTPATIENT)
Dept: CARDIOLOGY CLINIC | Age: 84
End: 2024-11-01

## 2024-11-01 ENCOUNTER — TELEPHONE (OUTPATIENT)
Dept: SURGERY | Age: 84
End: 2024-11-01

## 2024-11-01 VITALS
WEIGHT: 122 LBS | SYSTOLIC BLOOD PRESSURE: 110 MMHG | BODY MASS INDEX: 20.33 KG/M2 | HEIGHT: 65 IN | DIASTOLIC BLOOD PRESSURE: 60 MMHG

## 2024-11-01 DIAGNOSIS — K80.43 CALCULUS OF BILE DUCT WITH ACUTE CHOLECYSTITIS AND OBSTRUCTION: Primary | ICD-10-CM

## 2024-11-01 RX ORDER — SODIUM CHLORIDE 0.9 % (FLUSH) 0.9 %
5-40 SYRINGE (ML) INJECTION EVERY 12 HOURS SCHEDULED
OUTPATIENT
Start: 2024-11-01

## 2024-11-01 RX ORDER — LISINOPRIL 10 MG/1
10 TABLET ORAL DAILY
Qty: 30 TABLET | Refills: 1 | Status: SHIPPED | OUTPATIENT
Start: 2024-11-01

## 2024-11-01 RX ORDER — SODIUM CHLORIDE 9 MG/ML
INJECTION, SOLUTION INTRAVENOUS PRN
OUTPATIENT
Start: 2024-11-01

## 2024-11-01 RX ORDER — SODIUM CHLORIDE 0.9 % (FLUSH) 0.9 %
5-40 SYRINGE (ML) INJECTION PRN
OUTPATIENT
Start: 2024-11-01

## 2024-11-01 NOTE — PROGRESS NOTES
sea    New Patient Consult    [facility]  [facility]  Chuy Hardy MD    [address], [address]  [place], [place] [address]  Phone: [phone]  Fax: [phone]    Stephania iSms   YOB: 1940    Date of Visit:  11/1/2024    Azeem Christina MD    HPI:     Gallbladder: Patient is 84 y.o. year old female seen at request of Azeem Sterling MD.   Patient presents for evaluation of gallbladder problems. Patient was recently admitted with signs, symptoms and findings of choledocholithiasis which required ERCP to clear the stones from the duct.  Sphincterotomy done, stone removed, no stent was placed.  She has since felt well, no further jaundice. Patient takes Eliquis for A fib but has been allowed to hold this medication when needed for other procedures.    Allergies   Allergen Reactions    Codeine Nausea And Vomiting     Outpatient Medications Marked as Taking for the 11/1/24 encounter (Initial consult) with Chuy Hardy MD   Medication Sig Dispense Refill    apixaban (ELIQUIS) 2.5 MG TABS tablet Take 1 tablet by mouth 2 times daily 180 tablet 3    lisinopril (PRINIVIL;ZESTRIL) 10 MG tablet Take 1 tablet by mouth daily 30 tablet 1    sodium chloride 1 g tablet Take 1 tablet by mouth in the morning and at bedtime (Patient taking differently: Take 1 tablet by mouth daily) 60 tablet 0    propranolol (INDERAL LA) 120 MG extended release capsule TAKE 1 CAPSULE BY MOUTH EVERY DAY (Patient taking differently: Take 1 capsule by mouth daily TAKE 1 CAPSULE BY MOUTH EVERY DAY) 90 capsule 3    Cholecalciferol (VITAMIN D) 50 MCG (2000 UT) CAPS capsule Take 1 capsule by mouth daily      Multiple Vitamins-Minerals (MULTIVITAMIN ADULT PO) Take 1 tablet by mouth daily         Past Medical History:   Diagnosis Date    A-fib (HCC)     Arthritis     Basal cell carcinoma of skin of trunk 05/2011    back    Benign familial tremor     sxs began in HS    Choledocholithiasis

## 2024-11-01 NOTE — TELEPHONE ENCOUNTER
Refill request for     lisinopril (PRINIVIL;ZESTRIL) 10 MG tablet    medication.     Name of Pharmacy- [facility]      Last visit - [id number]     Pending visit - [id number]    Last refill -[id number]      Medication Contract signed -  Last Oarrs ran-         Additional Comments

## 2024-11-01 NOTE — PATIENT INSTRUCTIONS
[facility]  Phone: [phone]  Fax: [phone]    You will be scheduled for surgery with Dr. Hardy.   The office will call you with the date and time that surgery is scheduled.  Please take note of these instructions for surgery:  You should have nothing by mouth after midnight the night before your surgery - this includes no food or water.   Your surgery will be cancelled if you have taken anything by mouth after midnight, NO exceptions.   You will need to have a history and physical prior to your surgery. This will need to be completed up to 30 days before your surgery. This H/P can be completed by your family doctor or the hospital.   IF you take coumadin (warfarin), please stop taking this medication 5 days prior to your surgery.   IF you take plavix, please stop taking this 7 days prior to your surgery.   Please contact our office if you have a pacemaker or defibrillator.  IF you are allergic to latex, please tell our office prior to your surgery. This is important in know before scheduling your surgery.  IF you are having an out patient surgery, you will need someone available to drive you home after your surgery, and to also stay with you for the rest of the day.   IF you are having a surgery requiring an inpatient stay in the hospital, you will need someone to drive you home upon discharge from the hospital.  Please contact Dr. Hardy's assistant Azalia if you have any questions or concerns.  Please call the office with any changes in your symptoms or further questions/concerns. [phone]

## 2024-11-01 NOTE — TELEPHONE ENCOUNTER
Stephania Sims, 1940    Cardiac Risk Assessment    What type of procedure are you having?  ROBOTIC CHOLECYSTECTOMY WITH INTRAOPERATIVE CHOLANGIOGRAM, POSSIBLE OPEN PROCEDURE     When is your procedure scheduled for?  11/14/24    Medications to be stopped.  Eliquis    What physician is performing your procedure?  Dr. Chuy Ramirez    Phone Number:   [phone]    Fax number to send the letter:   [phone]    Cardiologist:   [email or web address]     Last Appointment:   10/11/24    Next Appointment:   04/11/25

## 2024-11-01 NOTE — TELEPHONE ENCOUNTER
Pt saw Dr Hardy in the office 11/1/24 and was given surgery instructions for a Robotic Cholecystectomy with Intraoperative Cholangiogram, Possible Open Procedure (General Anes) scheduled 11/14/24 @ 9:45am arrival 7:45am [facility] - NPO after midnight tristin b/f surgery - Pt will need  day of surgery - Cardiac Clearance request faxed to Dr Meredith's office (see media) - Dr Sterling to complete pre-op physical - Pt will need to stop Eliquis prior to surgery - Pt understood and agreed w/ above noted

## 2024-11-04 NOTE — TELEPHONE ENCOUNTER
Divya Mcnamara, APRN - CNP  [facility] Ep2 minutes ago (7:33 AM)       I would prefer the patient to be off Eliquis for 48 hours at the most if possible?  Can we find out how long they would like her off of it.  Her last ECG revealed atrial fibrillation. thanks

## 2024-11-04 NOTE — TELEPHONE ENCOUNTER
Divya Mcnamara, APRN - CNP  [facility] Ep1 hour ago (9:53 AM)       I am okay with 48 hours.  Thank you!

## 2024-11-04 NOTE — TELEPHONE ENCOUNTER
Sue Kenny MA  You; [facility] Ep21 minutes ago (8:34 AM)     KM  You need to ask Dr Meredith (pts cardiologist).       Divya Rogers NP is the provider the patient sees. She is wanting to know how long Dr. Hardy was requesting to hold the Eliquis. She would prefer no longer than 48 hours.

## 2024-11-04 NOTE — TELEPHONE ENCOUNTER
Letter created and sent electronically to Dr. Hardy. Also faxed to provided number. Fax sent successfully.

## 2024-11-04 NOTE — TELEPHONE ENCOUNTER
Chuy Hardy MD  [facility] Ep; Sue Kenny MAJust now (9:30 AM)       I think we usually have Eliquis held for 2 days before surgery to let it wear off.  As far as how long to hold it postop, if the surgery is uncomplicated I would say we don't need to hold it any longer than 48 hours more postop    Let me know if that's allowable..  DTW

## 2024-11-07 NOTE — PROGRESS NOTES
Surgery Date and Time: 11/14/24 @9:30 AM    Arrival Time: 7:30 AM        The instructions given when and if a patient needs to stop oral intake prior to surgery varies. Follow the instructions you were      given by your Surgeon or RN during the Pre-op call.      __X__Do not eat or drink anything after Midnight the night before the surgery. NO gum, mints, candy or ice chips the day of surgery.         Only take the following medications with a small sip of water the morning of surgery: Propranolol.        Hold the following medications (per anesthesia or surgeon request): Lisinopril     Last Dose: 11/11/24  @ PM dose.         Aspirin, Ibuprofen, Advil, Naproxen, Vitamin E and other Anti-inflammatory products and supplements should be stopped        for 5 -7days before surgery or as directed by your physician.         Check with your Surgeon/PCP/Cardiologist regarding stopping Plavix, Coumadin, Eliquis, Lovenox, Effient, Pradaxa, Xarelto, Fragmin or        other blood thinners and follow their instructions.  Medication: Eliquis           Last dose: 11/11/24 PM dose.                - If you take a long acting-insulin, take your normal dose the night before surgery & half the dose if taken in the morning.     - Do not smoke or vape, and do not drink any alcoholic beverages 24 hours prior to surgery, this includes NA Beer. Refrain from using     any recreational drugs, including non-prescribed prescription drugs.     -You may brush your teeth and gargle the morning of surgery.  DO NOT SWALLOW WATER.    -You MUST plan for a responsible adult to stay on site while you are here and take you home after your surgery. You will not be allowed                to leave alone or drive yourself home. It is requested someone stay with you the first 24 hrs. Your surgery will be cancelled if you do not                have a ride home with a responsible adult.    -A parent/legal guardian must accompany a child scheduled for  surgery and plan to stay at the hospital until the child                is discharged. Please do not bring other children with you.    -Please wear simple, loose-fitting clothing to the hospital. Do not bring valuables (money, credit cards, checkbooks, etc.)                Do not wear any makeup (including no eye makeup) and no nail polish if applicable.             - DO NOT wear any jewelry or body piercings day of surgery.  All body piercing jewelry must be removed.             - If you have dentures they will be removed before going to the OR; we will provide a container.  If you wear contact lenses                or glasses they will be removed, bring a case for them or wear glasses day of surgery.             - Please see your family doctor/pediatrician for a Preop History & Physical and/or concerning medications within 30               days of the surgery date. Non-[facility] physicians can fax H&P/test results to [phone]. You may need cardiac clearance               if you see a cardiologist, check with PCP or surgeon.              -If you have a Living Will and Durable Power of  for Healthcare, please bring in a copy to be scanned at registration.   -Notify your Surgeon if you develop any illness between now and the surgery date, cough, cold, fever, sore throat,     nausea, vomiting, etc.  Please notify your  surgeon if you experience dizziness, shortness of breath or blurred vision     between now & the time of your surgery.              -DO NOT shave your operative site less than 4 days prior to surgery. For face & neck surgery, men may use an electric                razor up to 2 days prior to surgery.   -To help prevent infection, change your sheets the night before surgery. Shower the night before and morning of surgery    using antibacterial soap (Dial or Safeguard) or Hibiclens soap as instructed by your surgeon.     - Do not apply lotion after shower or day of surgery.              -To provide

## 2024-11-07 NOTE — PROGRESS NOTES
Stephania BERNAL Kacer    Age 84 y.o.    female    1940    MRN [id number]    11/14/2024  Arrival Time_____________  OR Time____________113 Min     Procedure(s):  ROBOTIC CHOLECYSTECTOMY WITH INTRAOPERATIVE CHOLANGIOGRAM, POSSIBLE OPEN PROCEDURE                      General   Surgeon(s):  Hardy, Chuy Woodard, MD      DAY ADMIT ___  SDS/OP ___  OUTPT IN BED ___        Phone [phone] (home)                  PCP _____________________ Phone_________________ Epic ( ) Epic CE ( ) Appt ________    NOTES: _________________________________________ Consult/Cardio _______________    ____________________________________________________________________________    ____________________________________________________________________________  PAT APPT DATE:________ TIME: ________  FAXED QAD: _______  (__) H&P w/ Hospitalist    (__) PAT orders in EPIC    (__) Meet with PAT nurse  __________________________________________________________________________  Preop Nurse phone screen complete: _____________  (__) CBC     (__) W/ DIFF ___________  (__) CT CHEST  __________   (__) Hgb A1C    ___________  (__) CHEST X RAY   __________  (__) LIPID PROFILE  ___________  (__) EKG   __________  (__) PT-INR / APTT  ___________  (__) PFT's   __________  (__) BMP   ___________  (__) CAROTIDS  __________  (__) CMP   ___________  (__) VEIN MAPPING  __________  (__) U/A   ___________  ( X ) HISTORY & PHYSICAL __________  (__) URINE C & S  ___________  (__) CARDIAC CLEARANCE __________  (__) U/A W/ FLEX  ___________  (__) PULM. CLEARANCE __________  (__) SERUM PREGNANCY ___________  (__) Preop Orders in EPIC __________  (__) TYPE & SCREEN __________repeat ( ) (__)  __________________ __________  (__) Albumin   ___________  (__)  __________________ __________  (__) TRANSFERRIN  ___________  (__)  __________________ __________  (__) LIVER PROFILE  ___________  (__) URINE PREG DOS __________  (__) MRSA NASAL SWAB ___________  (__) BLOOD SUGAR  DOS __________  (__) SED RATE  ___________  (__) OAC  _________________________  (__) C-REACTIVE PROTEIN ___________    (__) VITAMIN D HYDROXY ___________  (__) BETABLOCKER  _________________                                                                                       (__) ACE/ARBS:__________________________    (__) GLP-1 Agonist ___________________                Ride home/Contact #_______________________   (__) SCLT2 inhibitor ___________________

## 2024-11-08 ENCOUNTER — OFFICE VISIT (OUTPATIENT)
Dept: INTERNAL MEDICINE CLINIC | Age: 84
End: 2024-11-08

## 2024-11-08 VITALS
BODY MASS INDEX: 18.83 KG/M2 | HEIGHT: 65 IN | SYSTOLIC BLOOD PRESSURE: 178 MMHG | OXYGEN SATURATION: 98 % | DIASTOLIC BLOOD PRESSURE: 94 MMHG | HEART RATE: 78 BPM | WEIGHT: 113 LBS

## 2024-11-08 DIAGNOSIS — I48.91 ATRIAL FIBRILLATION, UNSPECIFIED TYPE (HCC): ICD-10-CM

## 2024-11-08 DIAGNOSIS — Z01.818 PREOP EXAMINATION: Primary | ICD-10-CM

## 2024-11-08 DIAGNOSIS — K80.50 CHOLEDOCHOLITHIASIS: ICD-10-CM

## 2024-11-08 DIAGNOSIS — E87.1 HYPONATREMIA: ICD-10-CM

## 2024-11-08 RX ORDER — SODIUM CHLORIDE 1 G/1
1 TABLET ORAL 2 TIMES DAILY
Qty: 60 TABLET | Refills: 0 | Status: SHIPPED | OUTPATIENT
Start: 2024-11-08

## 2024-11-08 NOTE — PATIENT INSTRUCTIONS
-Last dose of Eliquis 11/11 PM dose  -Last day of lisinopril 11/13  -Continue with propranolol including day of surgery  -Can hold your vitamins day of surgery as well  -Refilled sodium chloride tablets.  After your surgery, you will need monitoring of your sodium level and possibly evaluation by nephrology

## 2024-11-08 NOTE — PROGRESS NOTES
09/24/2024 1+ (A)     WBC, UA 09/24/2024 3-5     RBC, UA 09/24/2024 0-2     Epithelial Cells, UA 09/24/2024 2-5     Renal Epithelial, UA 09/24/2024 0-1     Bacteria, UA 09/24/2024 2+ (A)     Ventricular Rate 09/24/2024 51     QRS Duration 09/24/2024 86     Q-T Interval 09/24/2024 444     QTc Calculation (Bazett) 09/24/2024 409     R Axis 09/24/2024 88     T Axis 09/24/2024 59     Diagnosis 09/24/2024 Baseline artifactAtrial fibrillation with slow ventricular responseNonspecific ST abnormalityAbnormal ECGWhen compared with ECG of 08-NOV-2023 13:45,No significant change was foundConfirmed by DAVID DIEZ ([id number]) on 9/24/2024 5:04:15 PM     Lipase 09/24/2024 24.0     Troponin, High Sensitivi* 09/24/2024 14     Protime 09/24/2024 19.0 (H)     INR 09/24/2024 1.58 (H)     SARS-CoV-2 RNA, RT PCR 09/24/2024 NOT DETECTED     Influenza A 09/24/2024 NOT DETECTED     Influenza B 09/24/2024 NOT DETECTED     Total CK 09/24/2024 60     Hep A IgM 09/24/2024 Non-reactive     Hep B Core Ab, IgM 09/24/2024 Non-reactive     Hep B S Ag Interp 09/24/2024 Non-reactive     Hep C Ab Interp 09/24/2024 Non-reactive     Sodium 09/25/2024 126 (L)     Potassium reflex Magnesi* 09/25/2024 4.1     Chloride 09/25/2024 93 (L)     CO2 09/25/2024 24     Anion Gap 09/25/2024 9     Glucose 09/25/2024 88     BUN 09/25/2024 13     Creatinine 09/25/2024 0.8     Est, Glom Filt Rate 09/25/2024 73     Calcium 09/25/2024 8.5     Total Protein 09/25/2024 5.3 (L)     Albumin 09/25/2024 3.1 (L)     Alkaline Phosphatase 09/25/2024 162 (H)     ALT 09/25/2024 250 (H)     AST 09/25/2024 118 (H)     Total Bilirubin 09/25/2024 1.1 (H)     Bilirubin, Direct 09/25/2024 0.5 (H)     Bilirubin, Indirect 09/25/2024 0.6     WBC 09/25/2024 5.3     RBC 09/25/2024 4.03     Hemoglobin 09/25/2024 12.6     Hematocrit 09/25/2024 38.5     MCV 09/25/2024 95.7     MCH 09/25/2024 31.2     MCHC 09/25/2024 32.6     RDW 09/25/2024 13.9     Platelets 09/25/2024 143     MPV 09/25/2024 8.9

## 2024-11-12 NOTE — PROGRESS NOTES
REVISED PRE OP INSTRUCTIONS    Surgery Date and Time: 11/14/24 @9:30 AM Arrival Time: 7:30 AM     The instructions given when and if a patient needs to stop oral intake prior to surgery varies. Follow the instructions you were  given by your Surgeon or RN during the Pre-op call.     __X_ _Do not eat or drink anything after Midnight the night before the surgery. NO gum, mints, candy, or ice chips the day of surgery.    Only take the following medications with a small sip of water the morning of surgery: Propranolol.      Hold the following medications (per anesthesia or surgeon request): Lisinopril Last Dose: 11/12/24 @ PM dose.     Aspirin, Ibuprofen, Advil, Naproxen, Vitamin E and other Anti-inflammatory products and supplements should be stopped   for 5 -7days before surgery or as directed by your physician.     Check with your Surgeon/PCP/Cardiologist regarding stopping Plavix, Coumadin, Eliquis, Lovenox, Effient, Pradaxa, Xarelto, Fragmin or   other blood thinners and follow their instructions. Medication: Eliquis Last dose: 11/11/24 PM dose.     - If you take a long acting-insulin, take your normal dose the night before surgery & half the dose if taken in the morning.     - Do not smoke or vape, and do not drink any alcoholic beverages 24 hours prior to surgery, this includes NA Beer. Refrain from using   any recreational drugs, including non-prescribed prescription drugs.   -You may brush your teeth and gargle the morning of surgery. DO NOT SWALLOW WATER.  -You MUST plan for a responsible adult to stay on site while you are here and take you home after your surgery. You will not be allowed   to leave alone or drive yourself home. It is requested someone stay with you the first 24 hrs. Your surgery will be cancelled if you do not   have a ride home with a responsible adult.  -A parent/legal guardian must accompany a child scheduled for surgery and plan to stay at the hospital until the child   is discharged.

## 2024-11-13 ENCOUNTER — ANESTHESIA EVENT (OUTPATIENT)
Dept: OPERATING ROOM | Age: 84
End: 2024-11-13
Payer: MEDICARE

## 2024-11-14 ENCOUNTER — HOSPITAL ENCOUNTER (OUTPATIENT)
Age: 84
Setting detail: OUTPATIENT SURGERY
Discharge: HOME | End: 2024-11-14
Attending: SURGERY | Admitting: SURGERY
Payer: MEDICARE

## 2024-11-14 ENCOUNTER — APPOINTMENT (OUTPATIENT)
Dept: GENERAL RADIOLOGY | Age: 84
End: 2024-11-14
Attending: SURGERY
Payer: MEDICARE

## 2024-11-14 ENCOUNTER — ANESTHESIA (OUTPATIENT)
Dept: OPERATING ROOM | Age: 84
End: 2024-11-14
Payer: MEDICARE

## 2024-11-14 VITALS
WEIGHT: 113 LBS | BODY MASS INDEX: 18.83 KG/M2 | HEART RATE: 72 BPM | SYSTOLIC BLOOD PRESSURE: 172 MMHG | RESPIRATION RATE: 14 BRPM | OXYGEN SATURATION: 96 % | TEMPERATURE: 97.9 F | HEIGHT: 65 IN | DIASTOLIC BLOOD PRESSURE: 114 MMHG

## 2024-11-14 DIAGNOSIS — G89.18 POSTOPERATIVE PAIN: Primary | ICD-10-CM

## 2024-11-14 DIAGNOSIS — K80.43 CALCULUS OF BILE DUCT WITH ACUTE CHOLECYSTITIS AND OBSTRUCTION: ICD-10-CM

## 2024-11-14 LAB
ANION GAP SERPL CALCULATED.3IONS-SCNC: 12 MMOL/L (ref 3–16)
BUN SERPL-MCNC: 13 MG/DL (ref 7–20)
CALCIUM SERPL-MCNC: 9.1 MG/DL (ref 8.3–10.6)
CHLORIDE SERPL-SCNC: 101 MMOL/L (ref 99–110)
CO2 SERPL-SCNC: 26 MMOL/L (ref 21–32)
CREAT SERPL-MCNC: 0.8 MG/DL (ref 0.6–1.2)
DEPRECATED RDW RBC AUTO: 14.4 % (ref 12.4–15.4)
GFR SERPLBLD CREATININE-BSD FMLA CKD-EPI: 73 ML/MIN/{1.73_M2}
GLUCOSE SERPL-MCNC: 104 MG/DL (ref 70–99)
HCT VFR BLD AUTO: 40.5 % (ref 36–48)
HGB BLD-MCNC: 13.3 G/DL (ref 12–16)
MCH RBC QN AUTO: 31.1 PG (ref 26–34)
MCHC RBC AUTO-ENTMCNC: 32.8 G/DL (ref 31–36)
MCV RBC AUTO: 95 FL (ref 80–100)
PLATELET # BLD AUTO: 171 K/UL (ref 135–450)
PMV BLD AUTO: 8.2 FL (ref 5–10.5)
POTASSIUM SERPL-SCNC: 4.5 MMOL/L (ref 3.5–5.1)
RBC # BLD AUTO: 4.26 M/UL (ref 4–5.2)
SODIUM SERPL-SCNC: 139 MMOL/L (ref 136–145)
WBC # BLD AUTO: 4.9 K/UL (ref 4–11)

## 2024-11-14 PROCEDURE — 6360000002 HC RX W HCPCS: Performed by: SURGERY

## 2024-11-14 PROCEDURE — 2500000003 HC RX 250 WO HCPCS: Performed by: NURSE ANESTHETIST, CERTIFIED REGISTERED

## 2024-11-14 PROCEDURE — 3600000019 HC SURGERY ROBOT ADDTL 15MIN: Performed by: SURGERY

## 2024-11-14 PROCEDURE — 88304 TISSUE EXAM BY PATHOLOGIST: CPT

## 2024-11-14 PROCEDURE — 2500000003 HC RX 250 WO HCPCS: Performed by: SURGERY

## 2024-11-14 PROCEDURE — S2900 ROBOTIC SURGICAL SYSTEM: HCPCS | Performed by: SURGERY

## 2024-11-14 PROCEDURE — 47563 LAPARO CHOLECYSTECTOMY/GRAPH: CPT | Performed by: SURGERY

## 2024-11-14 PROCEDURE — 2580000003 HC RX 258: Performed by: SURGERY

## 2024-11-14 PROCEDURE — 3700000001 HC ADD 15 MINUTES (ANESTHESIA): Performed by: SURGERY

## 2024-11-14 PROCEDURE — 6360000002 HC RX W HCPCS: Performed by: NURSE ANESTHETIST, CERTIFIED REGISTERED

## 2024-11-14 PROCEDURE — 3700000000 HC ANESTHESIA ATTENDED CARE: Performed by: SURGERY

## 2024-11-14 PROCEDURE — 7100000001 HC PACU RECOVERY - ADDTL 15 MIN: Performed by: SURGERY

## 2024-11-14 PROCEDURE — 6360000004 HC RX CONTRAST MEDICATION: Performed by: SURGERY

## 2024-11-14 PROCEDURE — 2580000003 HC RX 258: Performed by: NURSE ANESTHETIST, CERTIFIED REGISTERED

## 2024-11-14 PROCEDURE — 7100000000 HC PACU RECOVERY - FIRST 15 MIN: Performed by: SURGERY

## 2024-11-14 PROCEDURE — 2709999900 HC NON-CHARGEABLE SUPPLY: Performed by: SURGERY

## 2024-11-14 PROCEDURE — 85027 COMPLETE CBC AUTOMATED: CPT

## 2024-11-14 PROCEDURE — 7100000011 HC PHASE II RECOVERY - ADDTL 15 MIN: Performed by: SURGERY

## 2024-11-14 PROCEDURE — 74300 X-RAY BILE DUCTS/PANCREAS: CPT

## 2024-11-14 PROCEDURE — 3600000009 HC SURGERY ROBOT BASE: Performed by: SURGERY

## 2024-11-14 PROCEDURE — 7100000010 HC PHASE II RECOVERY - FIRST 15 MIN: Performed by: SURGERY

## 2024-11-14 PROCEDURE — 80048 BASIC METABOLIC PNL TOTAL CA: CPT

## 2024-11-14 RX ORDER — SODIUM CHLORIDE 0.9 % (FLUSH) 0.9 %
5-40 SYRINGE (ML) INJECTION PRN
Status: DISCONTINUED | OUTPATIENT
Start: 2024-11-14 | End: 2024-11-14 | Stop reason: HOSPADM

## 2024-11-14 RX ORDER — SODIUM CHLORIDE 0.9 % (FLUSH) 0.9 %
5-40 SYRINGE (ML) INJECTION EVERY 12 HOURS SCHEDULED
Status: DISCONTINUED | OUTPATIENT
Start: 2024-11-14 | End: 2024-11-14 | Stop reason: HOSPADM

## 2024-11-14 RX ORDER — OXYCODONE HYDROCHLORIDE 5 MG/1
5 TABLET ORAL PRN
Status: DISCONTINUED | OUTPATIENT
Start: 2024-11-14 | End: 2024-11-14 | Stop reason: HOSPADM

## 2024-11-14 RX ORDER — PROPOFOL 10 MG/ML
INJECTION, EMULSION INTRAVENOUS
Status: DISCONTINUED | OUTPATIENT
Start: 2024-11-14 | End: 2024-11-14 | Stop reason: SDUPTHER

## 2024-11-14 RX ORDER — OXYCODONE HYDROCHLORIDE 5 MG/1
10 TABLET ORAL PRN
Status: DISCONTINUED | OUTPATIENT
Start: 2024-11-14 | End: 2024-11-14 | Stop reason: HOSPADM

## 2024-11-14 RX ORDER — DEXAMETHASONE SODIUM PHOSPHATE 4 MG/ML
INJECTION, SOLUTION INTRA-ARTICULAR; INTRALESIONAL; INTRAMUSCULAR; INTRAVENOUS; SOFT TISSUE
Status: DISCONTINUED | OUTPATIENT
Start: 2024-11-14 | End: 2024-11-14 | Stop reason: SDUPTHER

## 2024-11-14 RX ORDER — SODIUM CHLORIDE 9 MG/ML
INJECTION, SOLUTION INTRAVENOUS PRN
Status: DISCONTINUED | OUTPATIENT
Start: 2024-11-14 | End: 2024-11-14 | Stop reason: HOSPADM

## 2024-11-14 RX ORDER — FENTANYL CITRATE 50 UG/ML
INJECTION, SOLUTION INTRAMUSCULAR; INTRAVENOUS
Status: DISCONTINUED | OUTPATIENT
Start: 2024-11-14 | End: 2024-11-14 | Stop reason: SDUPTHER

## 2024-11-14 RX ORDER — LIDOCAINE HYDROCHLORIDE 20 MG/ML
INJECTION, SOLUTION EPIDURAL; INFILTRATION; INTRACAUDAL; PERINEURAL
Status: DISCONTINUED | OUTPATIENT
Start: 2024-11-14 | End: 2024-11-14 | Stop reason: SDUPTHER

## 2024-11-14 RX ORDER — ONDANSETRON 2 MG/ML
INJECTION INTRAMUSCULAR; INTRAVENOUS
Status: DISCONTINUED | OUTPATIENT
Start: 2024-11-14 | End: 2024-11-14 | Stop reason: SDUPTHER

## 2024-11-14 RX ORDER — LIDOCAINE HYDROCHLORIDE 10 MG/ML
0.3 INJECTION, SOLUTION EPIDURAL; INFILTRATION; INTRACAUDAL; PERINEURAL
Status: DISCONTINUED | OUTPATIENT
Start: 2024-11-14 | End: 2024-11-14 | Stop reason: HOSPADM

## 2024-11-14 RX ORDER — ROCURONIUM BROMIDE 10 MG/ML
INJECTION, SOLUTION INTRAVENOUS
Status: DISCONTINUED | OUTPATIENT
Start: 2024-11-14 | End: 2024-11-14 | Stop reason: SDUPTHER

## 2024-11-14 RX ORDER — 0.9 % SODIUM CHLORIDE 0.9 %
INTRAVENOUS SOLUTION INTRAVENOUS CONTINUOUS PRN
Status: COMPLETED | OUTPATIENT
Start: 2024-11-14 | End: 2024-11-14

## 2024-11-14 RX ORDER — INDOCYANINE GREEN AND WATER 25 MG
2.5 KIT INJECTION ONCE
Status: COMPLETED | OUTPATIENT
Start: 2024-11-14 | End: 2024-11-14

## 2024-11-14 RX ORDER — SODIUM CHLORIDE, SODIUM LACTATE, POTASSIUM CHLORIDE, CALCIUM CHLORIDE 600; 310; 30; 20 MG/100ML; MG/100ML; MG/100ML; MG/100ML
INJECTION, SOLUTION INTRAVENOUS CONTINUOUS
Status: DISCONTINUED | OUTPATIENT
Start: 2024-11-14 | End: 2024-11-14 | Stop reason: HOSPADM

## 2024-11-14 RX ORDER — IOPAMIDOL 612 MG/ML
INJECTION, SOLUTION INTRAVASCULAR PRN
Status: DISCONTINUED | OUTPATIENT
Start: 2024-11-14 | End: 2024-11-14 | Stop reason: ALTCHOICE

## 2024-11-14 RX ORDER — BUPIVACAINE HYDROCHLORIDE 5 MG/ML
INJECTION, SOLUTION EPIDURAL; INTRACAUDAL PRN
Status: DISCONTINUED | OUTPATIENT
Start: 2024-11-14 | End: 2024-11-14 | Stop reason: ALTCHOICE

## 2024-11-14 RX ORDER — OXYCODONE AND ACETAMINOPHEN 5; 325 MG/1; MG/1
1 TABLET ORAL EVERY 4 HOURS PRN
Qty: 10 TABLET | Refills: 0 | Status: SHIPPED | OUTPATIENT
Start: 2024-11-14 | End: 2024-11-17

## 2024-11-14 RX ORDER — ONDANSETRON 2 MG/ML
4 INJECTION INTRAMUSCULAR; INTRAVENOUS EVERY 30 MIN PRN
Status: DISCONTINUED | OUTPATIENT
Start: 2024-11-14 | End: 2024-11-14 | Stop reason: HOSPADM

## 2024-11-14 RX ORDER — NALOXONE HYDROCHLORIDE 0.4 MG/ML
INJECTION, SOLUTION INTRAMUSCULAR; INTRAVENOUS; SUBCUTANEOUS PRN
Status: DISCONTINUED | OUTPATIENT
Start: 2024-11-14 | End: 2024-11-14 | Stop reason: HOSPADM

## 2024-11-14 RX ADMIN — METHOCARBAMOL 500 MG: 100 INJECTION INTRAMUSCULAR; INTRAVENOUS at 10:01

## 2024-11-14 RX ADMIN — LIDOCAINE HYDROCHLORIDE 50 MG: 20 INJECTION, SOLUTION EPIDURAL; INFILTRATION; INTRACAUDAL; PERINEURAL at 09:47

## 2024-11-14 RX ADMIN — DEXAMETHASONE SODIUM PHOSPHATE 4 MG: 4 INJECTION, SOLUTION INTRAMUSCULAR; INTRAVENOUS at 09:57

## 2024-11-14 RX ADMIN — INDOCYANINE GREEN AND WATER 2.5 MG: KIT at 08:47

## 2024-11-14 RX ADMIN — PROPOFOL 100 MG: 10 INJECTION, EMULSION INTRAVENOUS at 09:47

## 2024-11-14 RX ADMIN — FENTANYL CITRATE 50 MCG: 50 INJECTION, SOLUTION INTRAMUSCULAR; INTRAVENOUS at 09:47

## 2024-11-14 RX ADMIN — ROCURONIUM BROMIDE 50 MG: 50 INJECTION, SOLUTION INTRAVENOUS at 09:47

## 2024-11-14 RX ADMIN — ONDANSETRON 4 MG: 2 INJECTION INTRAMUSCULAR; INTRAVENOUS at 09:47

## 2024-11-14 RX ADMIN — FENTANYL CITRATE 50 MCG: 50 INJECTION, SOLUTION INTRAMUSCULAR; INTRAVENOUS at 10:13

## 2024-11-14 RX ADMIN — SUGAMMADEX 200 MG: 100 INJECTION, SOLUTION INTRAVENOUS at 10:42

## 2024-11-14 ASSESSMENT — PAIN SCALES - GENERAL
PAINLEVEL_OUTOF10: 0
PAINLEVEL_OUTOF10: 0

## 2024-11-14 NOTE — H&P
I have reviewed the history and physical and examined the patient.  I find no relevant changes.  I have reviewed with the patient and/or family members, during the preoperative office visit the risks, benefits, and alternatives to the procedure.    SALINAS STEPHENSON MD

## 2024-11-14 NOTE — ANESTHESIA POSTPROCEDURE EVALUATION
Department of Anesthesiology  Postprocedure Note    Patient: Stephania Sims  MRN: [id number]  YOB: 1940  Date of evaluation: 11/14/2024    Procedure Summary       Date: 11/14/24 Room / Location: [address]    Anesthesia Start: 0941 Anesthesia Stop: 1056    Procedure: ROBOTIC CHOLECYSTECTOMY WITH INTRAOPERATIVE CHOLANGIOGRAM, POSSIBLE OPEN PROCEDURE (Abdomen) Diagnosis:       Calculus of bile duct with acute cholecystitis and obstruction      (Calculus of bile duct with acute cholecystitis and obstruction [K80.43])    Surgeons: Chuy Hardy MD Responsible Provider: Satnam Barnhart MD    Anesthesia Type: general ASA Status: 3            Anesthesia Type: No value filed.    Nura Phase I: Nura Score: 8    Nura Phase II:      Anesthesia Post Evaluation    Patient location during evaluation: PACU  Level of consciousness: awake  Airway patency: patent  Nausea & Vomiting: no vomiting  Cardiovascular status: blood pressure returned to baseline  Respiratory status: acceptable  Hydration status: stable  Pain management: adequate    No notable events documented.

## 2024-11-14 NOTE — PROGRESS NOTES
Patient admitted to [facility] 3 in preparation for surgery, VSS. Consent confirmed. IV inserted into LFA, LR infusing. Belongings in PACU locker. NPO since 2200. Family at bedside, phone number in system for text updates, call light within reach.

## 2024-11-14 NOTE — OP NOTE
Operative Note      Patient: Stephania Sims  YOB: 1940  MRN: [id number]    Date of Procedure: 11/14/2024    Pre-Op Diagnosis Codes:      * Calculus of bile duct with acute cholecystitis and obstruction [K80.43]    Post-Op Diagnosis: Same       Procedure(s):  ROBOTIC CHOLECYSTECTOMY WITH INTRAOPERATIVE CHOLANGIOGRAM, POSSIBLE OPEN PROCEDURE    Surgeon(s):  Chuy Hardy MD    Assistant:   Surgical Assistant: Akua Valdivia    Anesthesia: General    Estimated Blood Loss (mL): less than 50     Complications: None    Specimens:   ID Type Source Tests Collected by Time Destination   A : GALLBLADDER AND CONTENTS Tissue Gallbladder SURGICAL PATHOLOGY Hardy, Chuy Woodard MD 11/14/2024 1033        Implants:  * No implants in log *      Drains: * No LDAs found *    Findings:  Infection Present At Time Of Surgery (PATOS) (choose all levels that have infection present):  No infection present  Other Findings: distended, patulous, non-inflamed GB               IOC - no filling defects, (+) flow into duodenum    Detailed Description of Procedure:   DESCRIPTION OF PROCEDURE: The patient was brought into the operating room   theater, placed supine on the operating room table, administered general anesthesia, intubated. The abdomen was then prepped and draped in a normal sterile fashion.  A trocar incision was made in the LUQ just off midline after infiltrating with local anesthesia. A trocar was placed through abdominal wall layers into peritoneal cavity under direct vision. The   abdomen was inspected. Under direct vision, a 12mm trocar was placed through a infraumbilical incision.  Another robotic trocar was placed in the right lateral subcostal region. An assist port was then placed in the right lower quadrant under direct vision.  The gallbladder appeared significantly distended but decompressed, non-inflamed.  The fundus was grasped and elevated in a cranial direction.  The infundibulum was retracted

## 2024-11-14 NOTE — DISCHARGE INSTRUCTIONS
[facility]    Chuy Hardy M.D.   [facility] Office      [facility] Office        [facility]               [address]                [address]  Kike Edwards M.D.              [address]           [address]          [place] [address]         [place] [address]  Erasmo Ceballos M.D                         [phone]        [facility]                   Skyler Barnhart M.D.          [facility]       POST-OPERATIVE INSTRUCTIONS FOR GALLBLADDER SURGERY    Call the office to schedule your post-operative appointment with your surgeon for two (2) weeks.     You will have surgical glue closing your incisions.    .     You may shower.  Wash incisions gently, and pat them dry. Do not rub your incisions.    General guidelines for activity:   Avoid strenuous activity or lifting anything heavier than 20 pounds.    It is OK to be up  walking around, and walking up and down stairs.     Do what is comfortable: stop and rest when you feel tired.   Drink plenty of fluids and stay on a bland diet for 2-3 days after surgery.     Do NOT drive while taking your narcotic pain medicine.     Watch for signs of infection:  Excessive warmth or bright redness around your incisions  Leakage cloudy fluid from you incisions  Fever over 101.5  During the laparoscopic procedure that you had, gas is pumped into the abdominal cavity.  You may feel abdominal, shoulder, or rib pain for a few days due to this gas.    You will have pain medicine ordered. Take as directed    If you experience constipation:  Increase your water intake  Increase your activity, walking is best.  A stool softener or mild laxative may be necessary if you still have not had a bowel movement ; call the office for further instructions.    Please take note: IF you do not take all of your narcotic pain medication, we ask that you dispose  prevent unwanted pregnancy.    The following instructions are to be followed if you have a known history or diagnosis of sleep apnea:  For all sleep apnea patients:  ? Sleep on your side or sitting up in a chair whenever possible, especially the first 24 hours after surgery.  ? Use only medicines prescribed by your doctor.    ? Do not drink alcohol.  ? If you have a dental device to assist you while at rest, use it at all times for the first 24 hours.  For patients using CPAP machines:  ? Use your CPAP machine during all periods of sleep as usual.  ? Use your CPAP machine during all periods of daytime rest while on pain medicines.  ** Follow up with your primary care doctor for continued care.    IF YOU DO NOT TAKE ALL OF YOUR NARCOTIC PAIN MEDICATION, please dispose of them responsibly. There are drop off boxes in the Emergency Departments 24/7 at both [facility]. If these locations are not convenient, other options for discarding them can be found at:  [email or web address]    Hospital or office staff may NOT accept any medications to drop off in the cabinet for you.

## 2024-11-14 NOTE — ANESTHESIA PRE PROCEDURE
Department of Anesthesiology  Preprocedure Note       Name:  Stephania Sims   Age:  84 y.o.  :  1940                                          MRN:  [id number]         Date:  2024      Surgeon: Surgeon(s):  Chuy Hardy MD    Procedure: Procedure(s):  ROBOTIC CHOLECYSTECTOMY WITH INTRAOPERATIVE CHOLANGIOGRAM, POSSIBLE OPEN PROCEDURE    Medications prior to admission:   Prior to Admission medications    Medication Sig Start Date End Date Taking? Authorizing Provider   sodium chloride 1 g tablet Take 1 tablet by mouth in the morning and at bedtime 24   Nico Hoffman,    lisinopril (PRINIVIL;ZESTRIL) 10 MG tablet Take 1 tablet by mouth daily  Patient taking differently: Take 1 tablet by mouth every morning LD 24 by 8 am per patient family for 24 surgery. 24   Nico Hoffman DO   apixaban (ELIQUIS) 2.5 MG TABS tablet Take 1 tablet by mouth 2 times daily  Patient taking differently: Take 1 tablet by mouth 2 times daily Last dose 24 PM dose for 24 surgery. 10/22/24   Divya Mcnamara, APRN - CNP   propranolol (INDERAL LA) 120 MG extended release capsule TAKE 1 CAPSULE BY MOUTH EVERY DAY  Patient taking differently: Take 1 capsule by mouth every morning TAKE 1 CAPSULE BY MOUTH EVERY DAY  Patient to take AM of 24 surgery. 23   Azeem Sterling MD   Cholecalciferol (VITAMIN D) 50 MCG (2000 UT) CAPS capsule Take 1 capsule by mouth every morning  Patient not taking: Reported on 2024    Candy Newton MD   Multiple Vitamins-Minerals (MULTIVITAMIN ADULT PO) Take 1 tablet by mouth every morning Patient to stop 1 week prior to 24 surgery.  Patient not taking: Reported on 2024    Candy Newton MD       Current medications:    Current Facility-Administered Medications   Medication Dose Route Frequency Provider Last Rate Last Admin   • lidocaine PF 1 % injection 0.3 mL  0.3 mL IntraDERmal Once PRN Ramo Spear MD       • lactated

## 2024-11-22 DIAGNOSIS — G25.0 BENIGN FAMILIAL TREMOR: Chronic | ICD-10-CM

## 2024-11-22 RX ORDER — LISINOPRIL 10 MG/1
10 TABLET ORAL DAILY
Qty: 90 TABLET | Refills: 3 | Status: SHIPPED | OUTPATIENT
Start: 2024-11-22

## 2024-11-22 RX ORDER — PROPRANOLOL HYDROCHLORIDE 120 MG/1
CAPSULE, EXTENDED RELEASE ORAL
Qty: 90 CAPSULE | Refills: 3 | Status: SHIPPED | OUTPATIENT
Start: 2024-11-22

## 2024-11-22 NOTE — TELEPHONE ENCOUNTER
Patient is switching to mail order prescriptions.     Refill request for medication.   PROPRANOLOL  LISINOPRIL  ELIQUIS    Name of Pharmacy-  [facility]      Last visit - 11-8-24     Pending visit - 2-14-25    Last refill -12-19-23      Medication Contract signed -   Last Oarrs ran-         Additional Comments

## 2024-11-27 ENCOUNTER — TELEPHONE (OUTPATIENT)
Dept: SURGERY | Age: 84
End: 2024-11-27

## 2024-11-27 NOTE — TELEPHONE ENCOUNTER
Pts daughter called stating her Mom had surgery on 11/14/24 and the area around her incision site is red, with a rash, and very itchy - Daughter asked if there is anything she can do for this - I recommended hydrocortisone and benadryl - Daughter said she would try this

## 2024-12-03 ENCOUNTER — OFFICE VISIT (OUTPATIENT)
Dept: SURGERY | Age: 84
End: 2024-12-03

## 2024-12-03 VITALS
SYSTOLIC BLOOD PRESSURE: 128 MMHG | BODY MASS INDEX: 20.49 KG/M2 | DIASTOLIC BLOOD PRESSURE: 78 MMHG | HEIGHT: 65 IN | WEIGHT: 123 LBS

## 2024-12-03 DIAGNOSIS — Z09 POSTOP CHECK: Primary | ICD-10-CM

## 2024-12-03 PROCEDURE — 99024 POSTOP FOLLOW-UP VISIT: CPT | Performed by: SURGERY

## 2024-12-03 NOTE — PROGRESS NOTES
Surgery Post-op Progress Note    HPI:  Notes reviewed, and agree with documentation in pt's chart.   Postoperative Follow-up: Patient presents for 2 week follow-up status post elective cholecystectomy . Eating a regular diet without difficulty. Bowel movements are Normal.  The patient is not having any pain..     ROS:    10 point review of systems performed; please refer to HPI with pertinent positives, all other ROS are negative    A review of the patient's record including allergies, medication list, tobacco history, family history, problem list, medical history and social history has been completed and updates made to the patient's EMR where indicated.     PE:   CONSTITUTIONAL:  awake and alert    ABDOMEN: soft, non-distended, non-tender     INCISION: clean, dry, no drainage, healing      ASSESSMENT:   Diagnosis Orders   1. Postop check          Doing well overall w/ good resolution of preoperative symptoms    PLAN:    Continue with routine wound care as discussed  Gradually increase activities as tolerated  Follow-up as needed; please call with questions or concerns

## 2024-12-23 DIAGNOSIS — E87.1 HYPONATREMIA: ICD-10-CM

## 2024-12-23 RX ORDER — SODIUM CHLORIDE 1 G/1
1 TABLET ORAL 2 TIMES DAILY
Qty: 60 TABLET | Refills: 0 | Status: CANCELLED | OUTPATIENT
Start: 2024-12-23

## 2024-12-23 NOTE — TELEPHONE ENCOUNTER
Additional Comments Patient's daughter, Kareen, wasn't sure if patient was supposed to still be taking this medication. If she is, please send prescription refill.    Refill request for sodium chloride  medication.     Name of Pharmacy- [facility]       Last visit - 11/8/2024     Pending visit - 2/21/2025    Last refill -11/8/2024      Medication Contract signed -   Last Obdulia wade-

## 2024-12-26 DIAGNOSIS — E87.1 HYPONATREMIA: ICD-10-CM

## 2024-12-26 RX ORDER — SODIUM CHLORIDE 1 G/1
1 TABLET ORAL 2 TIMES DAILY
Qty: 60 TABLET | Refills: 0 | Status: SHIPPED | OUTPATIENT
Start: 2024-12-26

## 2024-12-26 NOTE — TELEPHONE ENCOUNTER
Refill request for sodium chloride 1 g tablet  medication.     Name of Pharmacy- [facility]      Last visit - [id number]     Pending visit - [id number]    Last refill -[id number]      Medication Contract signed -  Last Oarrs ran-         Additional Comments

## 2025-02-20 SDOH — HEALTH STABILITY: PHYSICAL HEALTH: ON AVERAGE, HOW MANY DAYS PER WEEK DO YOU ENGAGE IN MODERATE TO STRENUOUS EXERCISE (LIKE A BRISK WALK)?: 5 DAYS

## 2025-02-20 SDOH — HEALTH STABILITY: PHYSICAL HEALTH: ON AVERAGE, HOW MANY MINUTES DO YOU ENGAGE IN EXERCISE AT THIS LEVEL?: 30 MIN

## 2025-02-21 ENCOUNTER — OFFICE VISIT (OUTPATIENT)
Dept: INTERNAL MEDICINE CLINIC | Age: 85
End: 2025-02-21

## 2025-02-21 VITALS
OXYGEN SATURATION: 98 % | HEART RATE: 78 BPM | HEIGHT: 65 IN | SYSTOLIC BLOOD PRESSURE: 135 MMHG | BODY MASS INDEX: 20.33 KG/M2 | WEIGHT: 122 LBS | DIASTOLIC BLOOD PRESSURE: 89 MMHG

## 2025-02-21 DIAGNOSIS — R74.8 ELEVATED LIVER ENZYMES: ICD-10-CM

## 2025-02-21 DIAGNOSIS — I48.91 ATRIAL FIBRILLATION, UNSPECIFIED TYPE (HCC): ICD-10-CM

## 2025-02-21 DIAGNOSIS — E87.1 HYPONATREMIA: Primary | ICD-10-CM

## 2025-02-21 DIAGNOSIS — Z12.83 SKIN EXAM, SCREENING FOR CANCER: ICD-10-CM

## 2025-02-21 DIAGNOSIS — G25.0 BENIGN FAMILIAL TREMOR: ICD-10-CM

## 2025-02-21 DIAGNOSIS — E87.1 HYPONATREMIA: ICD-10-CM

## 2025-02-21 PROBLEM — I27.20 MILD PULMONARY HYPERTENSION (HCC): Status: RESOLVED | Noted: 2024-06-28 | Resolved: 2025-02-21

## 2025-02-21 LAB
ALBUMIN SERPL-MCNC: 4.5 G/DL (ref 3.4–5)
ALBUMIN/GLOB SERPL: 2 {RATIO} (ref 1.1–2.2)
ALP SERPL-CCNC: 119 U/L (ref 40–129)
ALT SERPL-CCNC: 26 U/L (ref 10–40)
ANION GAP SERPL CALCULATED.3IONS-SCNC: 11 MMOL/L (ref 3–16)
AST SERPL-CCNC: 23 U/L (ref 15–37)
BILIRUB SERPL-MCNC: 0.6 MG/DL (ref 0–1)
BUN SERPL-MCNC: 17 MG/DL (ref 7–20)
CALCIUM SERPL-MCNC: 9.9 MG/DL (ref 8.3–10.6)
CHLORIDE SERPL-SCNC: 95 MMOL/L (ref 99–110)
CO2 SERPL-SCNC: 29 MMOL/L (ref 21–32)
CREAT SERPL-MCNC: 0.9 MG/DL (ref 0.6–1.2)
GFR SERPLBLD CREATININE-BSD FMLA CKD-EPI: 63 ML/MIN/{1.73_M2}
GLUCOSE SERPL-MCNC: 98 MG/DL (ref 70–99)
POTASSIUM SERPL-SCNC: 4.6 MMOL/L (ref 3.5–5.1)
PROT SERPL-MCNC: 6.8 G/DL (ref 6.4–8.2)
SODIUM SERPL-SCNC: 135 MMOL/L (ref 136–145)

## 2025-02-21 ASSESSMENT — PATIENT HEALTH QUESTIONNAIRE - PHQ9
SUM OF ALL RESPONSES TO PHQ9 QUESTIONS 1 & 2: 0
2. FEELING DOWN, DEPRESSED OR HOPELESS: NOT AT ALL
SUM OF ALL RESPONSES TO PHQ QUESTIONS 1-9: 0
1. LITTLE INTEREST OR PLEASURE IN DOING THINGS: NOT AT ALL
SUM OF ALL RESPONSES TO PHQ QUESTIONS 1-9: 0

## 2025-02-21 NOTE — PROGRESS NOTES
[facility] Office  [address]  [address]  [place], [place] [address]  Tel: [phone]    Stephania Sims  1940  female    CC:   Chief Complaint   Patient presents with    New Patient       HPI:   Patient presents today to establish care with me.  Last seen for preoperative consultation.    Since then has been doing well.  Had a cholecystectomy without events.  Denies any abdominal pain no nausea or vomiting.  Continues with her medicines as prescribed, currently only takes about 3 medicines.    States her health has been well.  She has not been wanting to go to the eye doctor or dentist due to her frequent doctor visits over the past couple months.  Is planning to see them soon.    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  passed 2 years ago, from MI. No smoking, alcohol, recreational drugs. 2 daughters and 1 son who live nearby.     Past Medical History:   Diagnosis Date    A-fib (HCC)     Arthritis     Basal cell carcinoma of skin of trunk 05/2011    back    Benign familial tremor     sxs began in HS    Choledocholithiasis     Decreased bone density     Hip fracture (HCC) 02/2012    sustained in fall Rt    Hypertension     GIUSEPPE (obstructive sleep apnea)     No equipment per daughter    Right inguinal hernia        Past Surgical History:   Procedure Laterality Date    CHOLECYSTECTOMY, LAPAROSCOPIC N/A 11/14/2024    ROBOTIC CHOLECYSTECTOMY WITH INTRAOPERATIVE CHOLANGIOGRAM, POSSIBLE OPEN PROCEDURE performed by Chuy Hardy MD at [facility] OR    DENTAL SURGERY  01/2013    tooth pulled    ERCP N/A 10/21/2024    ENDOSCOPIC RETROGRADE CHOLANGIOPANCREATOGRAPHY SPHINCTER/PAPILLOTOMY performed by Pieter Harry MD at [facility] ENDOSCOPY    ERCP  10/21/2024    ENDOSCOPIC RETROGRADE CHOLANGIOPANCREATOGRAPHY DILATION BALLOON performed by Pieter Harry MD at [facility] ENDOSCOPY    ERCP  10/21/2024    ENDOSCOPIC RETROGRADE CHOLANGIOPANCREATOGRAPHY STONE REMOVAL performed by Kamryn

## 2025-02-21 NOTE — PATIENT INSTRUCTIONS
-Continue with all your medicines as prescribed  -Please obtain blood work at your convenience this is to check your sodium level as well as your liver enzymes  -Recommend dentist annually as well as an eye doctor annually  -Also recommend annual skin exams by dermatologist  -We will plan to see you again in 6 months or sooner if needed

## 2025-04-10 NOTE — PROGRESS NOTES
Effective 4-3-24 10:00am EST  Please note reference ranges have  changed for PT and INR Testing.     02/12/2012 11.5 9.5 - 12.5 sec Final     INR   Date Value Ref Range Status   09/24/2024 1.58 (H) 0.85 - 1.15 Final     Comment:     Effective 4/3/24 at 10:00am EST    Normal: 0.85 - 1.15  Therapeutic: 2.0 - 3.0  Pros. Valve: 2.5 - 3.5  AMI: 2.0 - 3.0     02/12/2012 1.05 SEE BELOW Final     Comment:     0.85-1.15  Therapeutic-2.0-3.0  Pros. Valve-2.5-3.5     APTT: No components found for: \"PT2T\"  FASTING LIPID PANEL:   Lab Results   Component Value Date/Time    HDL 67 05/05/2023 10:38 AM    HDL 76 02/25/2011 10:08 AM    TRIG 97 05/05/2023 10:38 AM     LIVER PROFILE:No results for input(s): \"AST\", \"ALT\" in the last 72 hours.    Invalid input(s): \"ALB\"    IMPRESSION:    Patient Active Problem List   Diagnosis    Benign familial tremor    Family history of breast cancer    Essential hypertension    Osteopenia    Prediabetes    Closed fracture distal radius and ulna, left, initial encounter    Dislocation, finger, interphalangeal joint, initial encounter    History of falling    Personal history of nicotine dependence    Problems related to living alone    Atrial fibrillation (HCC)    Closed fracture of right distal radius    General weakness    Calculus of bile duct with acute cholecystitis and obstruction       Assessment:   Persistent atrial fibrillation              -[id number] score 5-age, female, hypertension, heart failure  Chronic HFpEF  HTN  Moderate to severe pulmonary hypertension  Moderate MR   Moderate TR      Plan:   Continue Eliquis 2.5 mg twice daily for stroke risk reduction  Continue propranolol 120 mg daily  Continue lisinopril 10 mg daily  Labs reviewed from 2/21/2025, TSH & CBC reviewed from 11/14/2024    Follow up in 9 months Divya BRISCOE Moderate        Divya Mcnamara APRN-CNP  [facility]  [phone]

## 2025-04-11 ENCOUNTER — OFFICE VISIT (OUTPATIENT)
Dept: CARDIOLOGY CLINIC | Age: 85
End: 2025-04-11
Payer: MEDICARE

## 2025-04-11 VITALS
WEIGHT: 126.76 LBS | SYSTOLIC BLOOD PRESSURE: 142 MMHG | DIASTOLIC BLOOD PRESSURE: 84 MMHG | HEART RATE: 61 BPM | OXYGEN SATURATION: 96 % | BODY MASS INDEX: 21.12 KG/M2 | HEIGHT: 65 IN

## 2025-04-11 DIAGNOSIS — I48.91 ATRIAL FIBRILLATION, UNSPECIFIED TYPE (HCC): ICD-10-CM

## 2025-04-11 DIAGNOSIS — I07.1 MODERATE TRICUSPID REGURGITATION: ICD-10-CM

## 2025-04-11 DIAGNOSIS — I48.19 PERSISTENT ATRIAL FIBRILLATION (HCC): ICD-10-CM

## 2025-04-11 DIAGNOSIS — I10 PRIMARY HYPERTENSION: ICD-10-CM

## 2025-04-11 DIAGNOSIS — I48.0 PAROXYSMAL A-FIB (HCC): Primary | ICD-10-CM

## 2025-04-11 DIAGNOSIS — I34.0 MODERATE MITRAL REGURGITATION: ICD-10-CM

## 2025-04-11 PROCEDURE — 1160F RVW MEDS BY RX/DR IN RCRD: CPT

## 2025-04-11 PROCEDURE — 99214 OFFICE O/P EST MOD 30 MIN: CPT

## 2025-04-11 PROCEDURE — 1159F MED LIST DOCD IN RCRD: CPT

## 2025-04-11 PROCEDURE — 3079F DIAST BP 80-89 MM HG: CPT

## 2025-04-11 PROCEDURE — G2211 COMPLEX E/M VISIT ADD ON: HCPCS

## 2025-04-11 PROCEDURE — 93000 ELECTROCARDIOGRAM COMPLETE: CPT

## 2025-04-11 PROCEDURE — G8427 DOCREV CUR MEDS BY ELIG CLIN: HCPCS

## 2025-04-11 PROCEDURE — G8420 CALC BMI NORM PARAMETERS: HCPCS

## 2025-04-11 PROCEDURE — 1090F PRES/ABSN URINE INCON ASSESS: CPT

## 2025-04-11 PROCEDURE — 3077F SYST BP >= 140 MM HG: CPT

## 2025-04-11 PROCEDURE — 1123F ACP DISCUSS/DSCN MKR DOCD: CPT

## 2025-04-11 PROCEDURE — 1036F TOBACCO NON-USER: CPT

## 2025-04-11 PROCEDURE — G8399 PT W/DXA RESULTS DOCUMENT: HCPCS

## 2025-04-11 NOTE — PATIENT INSTRUCTIONS
Continue Eliquis 2.5 mg twice daily for stroke risk reduction  Continue propranolol 120 mg daily  Continue lisinopril 10 mg daily  Labs reviewed from 2/21/2025, TSH & CBC reviewed from 11/14/2024    Follow up in 9 months iDvya PEOPLES  
no

## (undated) DEVICE — TROCAR: Brand: KII FIOS FIRST ENTRY

## (undated) DEVICE — SCISSORS SURG DIA8MM MPLR CRV ENDOWRIST

## (undated) DEVICE — THIN OFFSET (9.0 X 0.38 X 25.0MM)

## (undated) DEVICE — SUTURE VCRL SZ 2-0 L27IN ABSRB VLT L26MM SH 1/2 CIR J317H

## (undated) DEVICE — SOLUTION IRRG STRL H2O 500 ML BTL 16/CA

## (undated) DEVICE — BLADELESS OBTURATOR: Brand: WECK VISTA

## (undated) DEVICE — TIP COVER ACCESSORY

## (undated) DEVICE — SYRINGE INFL 60ML DISP ALLIANCE II

## (undated) DEVICE — SUTURE VCRL + SZ 0 L27IN ABSRB VLT L26MM UR-6 5/8 CIR VCP603H

## (undated) DEVICE — BANDAGE COMPR W4INXL15FT BGE E SGL LAYERED CLP CLSR

## (undated) DEVICE — FENESTRATED BIPOLAR FORCEPS: Brand: ENDOWRIST

## (undated) DEVICE — SOLUTION IRRIG 1000ML STRL H2O USP PLAS POUR BTL

## (undated) DEVICE — LIQUIBAND RAPID ADHESIVE 36/CS 0.8ML: Brand: MEDLINE

## (undated) DEVICE — WILLIS PACK: Brand: MEDLINE INDUSTRIES, INC.

## (undated) DEVICE — SEAL

## (undated) DEVICE — CADIERE FORCEPS: Brand: ENDOWRIST

## (undated) DEVICE — AIRSEAL BIFURCATED SMOKE EVAC FILTERED TUBE SET: Brand: AIRSEAL

## (undated) DEVICE — ARM DRAPE

## (undated) DEVICE — HYPODERMIC SAFETY NEEDLE: Brand: MAGELLAN

## (undated) DEVICE — PERMANENT CAUTERY HOOK: Brand: ENDOWRIST

## (undated) DEVICE — TOTAL TRAY, DB, 100% SILI FOLEY, 16FR 10: Brand: MEDLINE

## (undated) DEVICE — C-ARM PACK: Brand: C-ARM COVER

## (undated) DEVICE — CONMED SCOPE SAVER BITE BLOCK, 20X27 MM: Brand: SCOPE SAVER

## (undated) DEVICE — Device

## (undated) DEVICE — CANNULA SEAL

## (undated) DEVICE — SYSTEM SMK EVAC LAP TBNG FILTER HSNG BENT STYL PNK SEE CLR

## (undated) DEVICE — GLOVE,SURG,SENSICARE SLT,LF,PF,7.5: Brand: MEDLINE

## (undated) DEVICE — PADDING UNDERCAST W4INXL4YD 100% COT CRIMPED FINISH WBRL II

## (undated) DEVICE — REDUCER: Brand: ENDOWRIST

## (undated) DEVICE — SUTURE STRATAFIX SPRL MCRYL + SZ 3 0 L8IN ABSRB UD L26MM SH SXMP1B427

## (undated) DEVICE — GLOVE SURG SZ 75 CRM LTX FREE POLYISOPRENE POLYMER BEAD ANTI

## (undated) DEVICE — SYSTEM BX CAP BILI RAP EXCHG CAP LOK DEV COMPATIBLE W/ OLY

## (undated) DEVICE — GLOVE SURG SZ 65 CRM LTX FREE POLYISOPRENE POLYMER BEAD ANTI

## (undated) DEVICE — GLOVE SURG SZ 65 L12IN FNGR THK79MIL GRN LTX FREE

## (undated) DEVICE — SOLUTION IRRIG 2000ML STRL H2O UROMATIC PLAS CONT USP

## (undated) DEVICE — ELECTRODE ECG MONITR FOAM TEAR DROP ADLT RED

## (undated) DEVICE — ENDO CARRY-ON PROCEDURE KIT INCLUDES SUCTION TUBING, LUBRICANT, GAUZE, BIOHAZARD STICKER, TRANSPORT PAD AND INTERCEPT BEDSIDE KIT.: Brand: ENDO CARRY-ON PROCEDURE KIT

## (undated) DEVICE — SUTURE VCRL + SZ 3-0 L18IN ABSRB UD SH 1/2 CIR TAPERCUT NDL VCP864D

## (undated) DEVICE — BANDAGE COMPR W2INXL5YD TAN BRTH SELF ADH WRP W/ HND TEAR

## (undated) DEVICE — MEDIUM-LARGE CLIP APPLIER: Brand: ENDOWRIST

## (undated) DEVICE — BILIARY BALLOON DILATATION CATHETER: Brand: CRE™ RX

## (undated) DEVICE — RETRIEVAL BALLOON CATHETER: Brand: EXTRACTOR™ PRO RX

## (undated) DEVICE — Z INACTIVE USE 2854097 SPONGE GAUZE 4 X4 IN 12 PLY MEDLINE

## (undated) DEVICE — COLUMN DRAPE

## (undated) DEVICE — GOWN SIRUS NONREIN XL W/TWL: Brand: MEDLINE INDUSTRIES, INC.

## (undated) DEVICE — SUTURE VCRL + SZ 3-0 L27IN ABSRB UD L26MM SH 1/2 CIR VCP416H

## (undated) DEVICE — CANNULATING SPHINCTEROTOME: Brand: JAGTOME™ REVOLUTION PRO RX 39

## (undated) DEVICE — SUTURE VICRYL + SZ 3-0 L18IN ABSRB UD SH 1/2 CIR TAPERCUT NDL VCP864D

## (undated) DEVICE — GOWN,REINF,POLY,AURORA,XLNG/XXL,STRL: Brand: MEDLINE

## (undated) DEVICE — SOLUTION IV IRRIG 250ML ST LF 0.9% SODIUM 2F7122

## (undated) DEVICE — SINGLE USE DEVICE INTENDED TO COVER EXPOSED ENDS OF ORTHOPEDIC PIN AND K-WIRES TO HELP PROTECT THE EXPOSED WIRE FROM SNAGGING ON CLOTHING.: Brand: OXBORO™ PIN COVER

## (undated) DEVICE — BIT DRL DIA2.5MM DST VOLAR RAD DISP FOR ANAT VOLAR PLATING

## (undated) DEVICE — BIT DRL L4IN DIA2MM FAST GUID TECHNOLOGY DISP FOR DVR ANAT

## (undated) DEVICE — SYRINGE IRRIG 60ML SFT PLIABLE BLB EZ TO GRP 1 HND USE W/

## (undated) DEVICE — SUTURE VCRL + SZ 2-0 L27IN ABSRB VLT SH 1/2 CIR TAPERPOINT VCP317H

## (undated) DEVICE — INTENDED FOR TISSUE SEPARATION, AND OTHER PROCEDURES THAT REQUIRE A SHARP SURGICAL BLADE TO PUNCTURE OR CUT.: Brand: BARD-PARKER ® STAINLESS STEEL BLADES

## (undated) DEVICE — DECANTER BAG 9": Brand: MEDLINE INDUSTRIES, INC.

## (undated) DEVICE — SUTURE MCRYL + SZ 4-0 L18IN ABSRB UD L19MM PS-2 3/8 CIR MCP496G

## (undated) DEVICE — SPLINT ORTH W3XL12IN LAYERED FBRGLS FOAM PD BRTH BK MOLD

## (undated) DEVICE — SHEET,DRAPE,53X77,STERILE: Brand: MEDLINE

## (undated) DEVICE — SUTURE VICRYL + SZ 0 L27IN ABSRB VLT L26MM UR-6 5/8 CIR VCP603H

## (undated) DEVICE — MEGA SUTURECUT ND: Brand: ENDOWRIST